# Patient Record
Sex: MALE | Race: WHITE | NOT HISPANIC OR LATINO | Employment: UNEMPLOYED | URBAN - METROPOLITAN AREA
[De-identification: names, ages, dates, MRNs, and addresses within clinical notes are randomized per-mention and may not be internally consistent; named-entity substitution may affect disease eponyms.]

---

## 2018-05-02 ENCOUNTER — TELEPHONE (OUTPATIENT)
Dept: BEHAVIORAL HEALTH | Facility: CLINIC | Age: 48
End: 2018-05-02

## 2018-05-02 ENCOUNTER — HOSPITAL ENCOUNTER (EMERGENCY)
Facility: CLINIC | Age: 48
Discharge: NURSING, REHAB OR LONG-TERM CARE | End: 2018-05-02
Attending: EMERGENCY MEDICINE | Admitting: EMERGENCY MEDICINE
Payer: COMMERCIAL

## 2018-05-02 ENCOUNTER — HOSPITAL ENCOUNTER (INPATIENT)
Facility: CLINIC | Age: 48
LOS: 44 days | Discharge: NURSING, REHAB OR LONG-TERM CARE | End: 2018-06-15
Attending: PSYCHIATRY & NEUROLOGY | Admitting: PSYCHIATRY & NEUROLOGY
Payer: COMMERCIAL

## 2018-05-02 VITALS
HEART RATE: 111 BPM | RESPIRATION RATE: 20 BRPM | DIASTOLIC BLOOD PRESSURE: 77 MMHG | SYSTOLIC BLOOD PRESSURE: 146 MMHG | WEIGHT: 240 LBS | OXYGEN SATURATION: 96 % | TEMPERATURE: 98.4 F | BODY MASS INDEX: 31.81 KG/M2 | HEIGHT: 73 IN

## 2018-05-02 DIAGNOSIS — F25.0 SCHIZOAFFECTIVE DISORDER, BIPOLAR TYPE (H): ICD-10-CM

## 2018-05-02 DIAGNOSIS — R45.851 SUICIDAL IDEATION: ICD-10-CM

## 2018-05-02 DIAGNOSIS — I15.8 OTHER SECONDARY HYPERTENSION: ICD-10-CM

## 2018-05-02 DIAGNOSIS — R52 MODERATE PAIN: ICD-10-CM

## 2018-05-02 DIAGNOSIS — Z72.0 TOBACCO USE: ICD-10-CM

## 2018-05-02 DIAGNOSIS — F25.0 SCHIZOAFFECTIVE DISORDER, BIPOLAR TYPE (H): Primary | ICD-10-CM

## 2018-05-02 LAB
AMPHETAMINES UR QL SCN: POSITIVE
ANION GAP SERPL CALCULATED.3IONS-SCNC: 12 MMOL/L (ref 3–14)
BARBITURATES UR QL: NEGATIVE
BASOPHILS # BLD AUTO: 0.1 10E9/L (ref 0–0.2)
BASOPHILS NFR BLD AUTO: 0.8 %
BENZODIAZ UR QL: NEGATIVE
BUN SERPL-MCNC: 13 MG/DL (ref 7–30)
CALCIUM SERPL-MCNC: 8.6 MG/DL (ref 8.5–10.1)
CANNABINOIDS UR QL SCN: POSITIVE
CHLORIDE SERPL-SCNC: 104 MMOL/L (ref 94–109)
CO2 SERPL-SCNC: 21 MMOL/L (ref 20–32)
COCAINE UR QL: NEGATIVE
CREAT SERPL-MCNC: 0.77 MG/DL (ref 0.66–1.25)
DEPRECATED S PYO AG THROAT QL EIA: NORMAL
DIFFERENTIAL METHOD BLD: NORMAL
EOSINOPHIL # BLD AUTO: 0.3 10E9/L (ref 0–0.7)
EOSINOPHIL NFR BLD AUTO: 3.3 %
ERYTHROCYTE [DISTWIDTH] IN BLOOD BY AUTOMATED COUNT: 12.5 % (ref 10–15)
ETHANOL SERPL-MCNC: <0.01 G/DL
GFR SERPL CREATININE-BSD FRML MDRD: >90 ML/MIN/1.7M2
GLUCOSE SERPL-MCNC: 123 MG/DL (ref 70–99)
HCT VFR BLD AUTO: 40.2 % (ref 40–53)
HGB BLD-MCNC: 14.2 G/DL (ref 13.3–17.7)
IMM GRANULOCYTES # BLD: 0.1 10E9/L (ref 0–0.4)
IMM GRANULOCYTES NFR BLD: 1.2 %
LYMPHOCYTES # BLD AUTO: 2 10E9/L (ref 0.8–5.3)
LYMPHOCYTES NFR BLD AUTO: 22.9 %
MCH RBC QN AUTO: 29.6 PG (ref 26.5–33)
MCHC RBC AUTO-ENTMCNC: 35.3 G/DL (ref 31.5–36.5)
MCV RBC AUTO: 84 FL (ref 78–100)
MONOCYTES # BLD AUTO: 0.5 10E9/L (ref 0–1.3)
MONOCYTES NFR BLD AUTO: 5.5 %
NEUTROPHILS # BLD AUTO: 5.9 10E9/L (ref 1.6–8.3)
NEUTROPHILS NFR BLD AUTO: 66.3 %
OPIATES UR QL SCN: NEGATIVE
PCP UR QL SCN: NEGATIVE
PLATELET # BLD AUTO: 227 10E9/L (ref 150–450)
POTASSIUM SERPL-SCNC: 3.8 MMOL/L (ref 3.4–5.3)
RBC # BLD AUTO: 4.79 10E12/L (ref 4.4–5.9)
SODIUM SERPL-SCNC: 137 MMOL/L (ref 133–144)
SPECIMEN SOURCE: NORMAL
TSH SERPL DL<=0.005 MIU/L-ACNC: 1.34 MU/L (ref 0.4–4)
VALPROATE FREE SERPL-MCNC: 7.6 UG/ML
VALPROATE SERPL-MCNC: 79 MG/L (ref 50–100)
WBC # BLD AUTO: 8.8 10E9/L (ref 4–11)

## 2018-05-02 PROCEDURE — 80307 DRUG TEST PRSMV CHEM ANLYZR: CPT | Performed by: EMERGENCY MEDICINE

## 2018-05-02 PROCEDURE — 87081 CULTURE SCREEN ONLY: CPT | Performed by: EMERGENCY MEDICINE

## 2018-05-02 PROCEDURE — 90791 PSYCH DIAGNOSTIC EVALUATION: CPT

## 2018-05-02 PROCEDURE — 87880 STREP A ASSAY W/OPTIC: CPT | Performed by: EMERGENCY MEDICINE

## 2018-05-02 PROCEDURE — 12400007 ZZH R&B MH INTERMEDIATE UMMC

## 2018-05-02 PROCEDURE — 36415 COLL VENOUS BLD VENIPUNCTURE: CPT

## 2018-05-02 PROCEDURE — 85025 COMPLETE CBC W/AUTO DIFF WBC: CPT | Performed by: EMERGENCY MEDICINE

## 2018-05-02 PROCEDURE — 80048 BASIC METABOLIC PNL TOTAL CA: CPT | Performed by: EMERGENCY MEDICINE

## 2018-05-02 PROCEDURE — 80164 ASSAY DIPROPYLACETIC ACD TOT: CPT | Performed by: EMERGENCY MEDICINE

## 2018-05-02 PROCEDURE — 99285 EMERGENCY DEPT VISIT HI MDM: CPT | Mod: 25

## 2018-05-02 PROCEDURE — 80165 DIPROPYLACETIC ACID FREE: CPT | Performed by: EMERGENCY MEDICINE

## 2018-05-02 PROCEDURE — 84443 ASSAY THYROID STIM HORMONE: CPT | Performed by: EMERGENCY MEDICINE

## 2018-05-02 PROCEDURE — 25000132 ZZH RX MED GY IP 250 OP 250 PS 637: Performed by: PSYCHIATRY & NEUROLOGY

## 2018-05-02 PROCEDURE — 80320 DRUG SCREEN QUANTALCOHOLS: CPT | Performed by: EMERGENCY MEDICINE

## 2018-05-02 RX ORDER — LANOLIN ALCOHOL/MO/W.PET/CERES
1000 CREAM (GRAM) TOPICAL DAILY
Status: ON HOLD | COMMUNITY
End: 2018-05-21

## 2018-05-02 RX ORDER — ACETAMINOPHEN 325 MG/1
650 TABLET ORAL EVERY 4 HOURS PRN
Status: DISCONTINUED | OUTPATIENT
Start: 2018-05-02 | End: 2018-06-15 | Stop reason: HOSPADM

## 2018-05-02 RX ORDER — LISINOPRIL 20 MG/1
20 TABLET ORAL DAILY
Status: ON HOLD | COMMUNITY
Start: 2018-02-08 | End: 2018-05-21

## 2018-05-02 RX ORDER — BISACODYL 10 MG
10 SUPPOSITORY, RECTAL RECTAL DAILY PRN
Status: DISCONTINUED | OUTPATIENT
Start: 2018-05-02 | End: 2018-06-15 | Stop reason: HOSPADM

## 2018-05-02 RX ORDER — DIVALPROEX SODIUM 500 MG/1
2000 TABLET, EXTENDED RELEASE ORAL AT BEDTIME
Status: DISCONTINUED | OUTPATIENT
Start: 2018-05-02 | End: 2018-06-08

## 2018-05-02 RX ORDER — OLANZAPINE 10 MG/1
10 TABLET ORAL
Status: DISCONTINUED | OUTPATIENT
Start: 2018-05-02 | End: 2018-06-15 | Stop reason: HOSPADM

## 2018-05-02 RX ORDER — NICOTINE 21 MG/24HR
1 PATCH, TRANSDERMAL 24 HOURS TRANSDERMAL DAILY
Status: DISCONTINUED | OUTPATIENT
Start: 2018-05-02 | End: 2018-06-15 | Stop reason: HOSPADM

## 2018-05-02 RX ORDER — ALUMINA, MAGNESIA, AND SIMETHICONE 2400; 2400; 240 MG/30ML; MG/30ML; MG/30ML
30 SUSPENSION ORAL EVERY 4 HOURS PRN
Status: DISCONTINUED | OUTPATIENT
Start: 2018-05-02 | End: 2018-06-15 | Stop reason: HOSPADM

## 2018-05-02 RX ORDER — HYDROXYZINE HYDROCHLORIDE 25 MG/1
25 TABLET, FILM COATED ORAL EVERY 4 HOURS PRN
Status: DISCONTINUED | OUTPATIENT
Start: 2018-05-02 | End: 2018-06-15 | Stop reason: HOSPADM

## 2018-05-02 RX ORDER — LISINOPRIL 20 MG/1
20 TABLET ORAL DAILY
Status: DISCONTINUED | OUTPATIENT
Start: 2018-05-03 | End: 2018-06-15 | Stop reason: HOSPADM

## 2018-05-02 RX ORDER — OLANZAPINE 10 MG/2ML
10 INJECTION, POWDER, FOR SOLUTION INTRAMUSCULAR
Status: DISCONTINUED | OUTPATIENT
Start: 2018-05-02 | End: 2018-06-15 | Stop reason: HOSPADM

## 2018-05-02 RX ORDER — LANOLIN ALCOHOL/MO/W.PET/CERES
1000 CREAM (GRAM) TOPICAL DAILY
Status: DISCONTINUED | OUTPATIENT
Start: 2018-05-03 | End: 2018-06-15 | Stop reason: HOSPADM

## 2018-05-02 RX ORDER — TRAZODONE HYDROCHLORIDE 50 MG/1
50 TABLET, FILM COATED ORAL
Status: DISCONTINUED | OUTPATIENT
Start: 2018-05-02 | End: 2018-06-15 | Stop reason: HOSPADM

## 2018-05-02 RX ORDER — MIRTAZAPINE 15 MG/1
15 TABLET, FILM COATED ORAL AT BEDTIME
Status: DISCONTINUED | OUTPATIENT
Start: 2018-05-02 | End: 2018-06-15 | Stop reason: HOSPADM

## 2018-05-02 RX ORDER — LANOLIN ALCOHOL/MO/W.PET/CERES
3 CREAM (GRAM) TOPICAL
Status: DISCONTINUED | OUTPATIENT
Start: 2018-05-02 | End: 2018-06-15 | Stop reason: HOSPADM

## 2018-05-02 RX ORDER — MIRTAZAPINE 15 MG/1
15 TABLET, FILM COATED ORAL AT BEDTIME
Status: ON HOLD | COMMUNITY
Start: 2018-03-12 | End: 2018-05-21

## 2018-05-02 RX ORDER — DIVALPROEX SODIUM 500 MG/1
2000 TABLET, EXTENDED RELEASE ORAL AT BEDTIME
Status: ON HOLD | COMMUNITY
Start: 2018-03-12 | End: 2018-05-21

## 2018-05-02 RX ADMIN — NICOTINE 1 PATCH: 21 PATCH, EXTENDED RELEASE TRANSDERMAL at 21:28

## 2018-05-02 RX ADMIN — DIVALPROEX SODIUM 2000 MG: 500 TABLET, EXTENDED RELEASE ORAL at 21:28

## 2018-05-02 RX ADMIN — MIRTAZAPINE 15 MG: 15 TABLET, FILM COATED ORAL at 21:28

## 2018-05-02 ASSESSMENT — ENCOUNTER SYMPTOMS
FEVER: 0
SORE THROAT: 1
SLEEP DISTURBANCE: 1
HALLUCINATIONS: 0
COUGH: 0
NERVOUS/ANXIOUS: 1

## 2018-05-02 ASSESSMENT — ACTIVITIES OF DAILY LIVING (ADL)
GROOMING: INDEPENDENT
DRESS: INDEPENDENT
LAUNDRY: WITH SUPERVISION
ORAL_HYGIENE: INDEPENDENT

## 2018-05-02 NOTE — ED NOTES
[facility] called any information sent on pt. BALAJI Miller stated she would call back with update.

## 2018-05-02 NOTE — IP AVS SNAPSHOT
[address] [address]    Phone:  [phone]                                       After Visit Summary   5/2/2018    Nabor Mendoza    MRN: [id number]           After Visit Summary Signature Page     I have received my discharge instructions, and my questions have been answered. I have discussed any challenges I see with this plan with the nurse or doctor.    ..........................................................................................................................................  Patient/Patient Representative Signature      ..........................................................................................................................................  Patient Representative Print Name and Relationship to Patient    ..................................................               ................................................  Date                                            Time    ..........................................................................................................................................  Reviewed by Signature/Title    ...................................................              ..............................................  Date                                                            Time

## 2018-05-02 NOTE — ED NOTES
BALAJI Miller of [facility] called informing they would not accept pt. MD notified.

## 2018-05-02 NOTE — ED NOTES
"Pt being manipulative asking to be able to leave in\"5seconds\". Pt updated on hold and plan to admit to the hospital. Pt agreed. Pt now talking on the phone with wife.   "

## 2018-05-02 NOTE — ED PROVIDER NOTES
Sign Out Note    Pt accepted in sign out from: Dr. Baxter    Briefly pt presented to the ED for: SI    Plan at time of sign out: await placement.    Care of patient during my shift: no issues.    Plan for patient at this time: pt accepted by Dr. Chatterjee at [facility]. Pt requests to go there. Proper paperwork filled out.     Davonte Neff,   05/02/18 1737
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
## 2018-05-02 NOTE — IP AVS SNAPSHOT
MRN:[id number]                      After Visit Summary   5/2/2018    Nabor Mendoza    MRN: [id number]           Thank you!     Thank you for choosing [facility] for your care. Our goal is always to provide you with excellent care.        Patient Information     Date Of Birth          1970        Designated Caregiver       Most Recent Value    Caregiver    Will someone help with your care after discharge? yes    Name of designated caregiver Dionna Mora    Phone number of caregiver [phone]    Caregiver address [address], [facility], [address]      About your hospital stay     You were admitted on:  May 2, 2018 You last received care in the:   [facility]    You were discharged on:  Lakia 15, 2018       Who to Call     For medical emergencies, please call 911.  For non-urgent questions about your medical care, please call your primary care provider or clinic, [phone]          Attending Provider     Provider Specialty    Jamaal Golden MD Psychiatry    Vasu Chatterjee MD Psychiatry       Primary Care Provider Office Phone # Fax #    Herman Torrez [phone]      Additional Services     Medication Therapy Management Referral       MTM referral reason            Depakote prescribed     This service is designed to help you get the most from your medications.  A specially trained pharmacist will work closely with you and your doctors  to solve any problems related to your medications and to help you get the   best results from taking them.      The Medication Therapy Management staff will call you to schedule an appointment.                  Further instructions from your care team        Behavioral Discharge Planning and Instructions      Summary:  You were admitted on 5/2/2018  due to relapsing and mood disorder.  You were treated by Dr. Vasu Chatterjee MD and discharged on 6/15/18 from [facility] to [facility]: [address] [place] [address].  Patient's provisional discharge (PD) was revoke from the [facility]. Wale Woo ([phone]) from [facility] will complete the new PD.      Principal Diagnosis: Schizophrenia      Health Care Follow-up Appointments:   [facility] Psychiatry [phone]  [address] [facility]  Dr. Torrez Tuesday July 24th @ 9:40am  Invega Injection: Monday June 18th @ 9:20  Invega Injection on July 13th @ 1:20        : April Colon  [phone]          Attend all scheduled appointments with your outpatient providers. Call at least 24 hours in advance if you need to reschedule an appointment to ensure continued access to your outpatient providers.   Major Treatments, Procedures and Findings:  You were provided with: a psychiatric assessment, medication evaluation and/or management and CD evaluation/assessment    Symptoms to Report: feeling more aggressive, increased confusion, losing more sleep, mood getting worse or thoughts of suicide    Early warning signs can include: increased depression or anxiety sleep disturbances increased thoughts or behaviors of suicide or self-harm  increased unusual thinking, such as paranoia or hearing voices    Safety and Wellness:  Take all medicines as directed.  Make no changes unless your doctor suggests them.      Follow treatment recommendations.  Refrain from alcohol and non-prescribed drugs.  If there is a concern for safety, call 911.    Resources:   Crisis Intervention: 557.206.3508 or 491-502-3912 (TTY: 155.787.3751).  Call anytime for help.  National [place] on Mental Illness (www.[place].org): 561.915.4469 or 068-099-3586.  Alcoholics Anonymous (www.alcoholics-anonymous.org): Check your phone book for your local chapter.  [facility] Crisis (COPE) Response - Adult [phone]    The treatment team has appreciated the opportunity to work with you.     If you have any questions or concerns our unit number is [phone].        Pending Results     No orders  "found from 2018 to 5/3/2018.            Statement of Approval     Ordered          18 1728  I have reviewed and agree with all the recommendations and orders detailed in this document.  EFFECTIVE NOW     Approved and electronically signed by:  Vasu Chatterjee MD             Admission Information     Date & Time Provider Department Dept. Phone    2018 Vasu Chatterjee MD  [facility] [phone]      Your Vitals Were     Blood Pressure Pulse Temperature Respirations Height Weight    143/87 (BP Location: Left arm) 93 98.5  F (36.9  C) (Oral) 16 1.829 m (6') 112.9 kg (249 lb)    Pulse Oximetry BMI (Body Mass Index)                97% 33.77 kg/m2          MyChart Information     [employer] lets you send messages to your doctor, view your test results, renew your prescriptions, schedule appointments and more. To sign up, go to [email or web address] . Click on \"Log in\" on the left side of the screen, which will take you to the Welcome page. Then click on \"Sign up Now\" on the right side of the page.     You will be asked to enter the access code listed below, as well as some personal information. Please follow the directions to create your username and password.     Your access code is: [email or web address]  Expires: 2018  4:26 PM     Your access code will  in 90 days. If you need help or a new code, please call your [facility] clinic or [phone].        Care EveryWhere ID     This is your Care EveryWhere ID. This could be used by other organizations to access your [facility] medical records  [id number]        Equal Access to Services     [facility] : marifer Tamez, adrienne dorsey. So [facility] [phone].    ATENCIÓN: Si habla español, tiene a sawyer disposición servicios gratuitos de asistencia lingüística. Llame al [phone].    We comply with applicable federal civil rights laws and [place] laws. We do not " discriminate on the basis of race, color, national origin, age, disability, sex, sexual orientation, or gender identity.               Review of your medicines      START taking        Dose / Directions    hydrOXYzine 25 MG tablet   Commonly known as:  ATARAX        Dose:  25 mg   Take 1 tablet (25 mg) by mouth every 4 hours as needed for anxiety   Quantity:  30 tablet   Refills:  1       ibuprofen 600 MG tablet   Commonly known as:  ADVIL/MOTRIN        Dose:  600 mg   Take 1 tablet (600 mg) by mouth every 6 hours as needed for moderate pain   Quantity:  30 tablet   Refills:  1       nicotine 21 MG/24HR 24 hr patch   Commonly known as:  NICODERM CQ        Dose:  1 patch   Place 1 patch onto the skin daily   Quantity:  30 patch   Refills:  1       * OLANZapine 5 MG tablet   Commonly known as:  zyPREXA        Dose:  5 mg   Take 1 tablet (5 mg) by mouth At Bedtime   Quantity:  30 tablet   Refills:  1       * OLANZapine 7.5 MG tablet   Commonly known as:  zyPREXA        Dose:  7.5 mg   Take 1 tablet (7.5 mg) by mouth daily   Quantity:  30 tablet   Refills:  1       * Notice:  This list has 2 medication(s) that are the same as other medications prescribed for you. Read the directions carefully, and ask your doctor or other care provider to review them with you.      CONTINUE these medicines which may have CHANGED, or have new prescriptions. If we are uncertain of the size of tablets/capsules you have at home, strength may be listed as something that might have changed.        Dose / Directions    * divalproex sodium extended-release 500 MG 24 hr tablet   Commonly known as:  DEPAKOTE ER   This may have changed:  Another medication with the same name was added. Make sure you understand how and when to take each.        Dose:  2000 mg   Take 4 tablets (2,000 mg) by mouth At Bedtime   Quantity:  120 tablet   Refills:  1       * divalproex sodium extended-release 250 MG 24 hr tablet   Commonly known as:  DEPAKOTE ER   This  may have changed:  You were already taking a medication with the same name, and this prescription was added. Make sure you understand how and when to take each.        Dose:  1500 mg   Take 6 tablets (1,500 mg) by mouth At Bedtime   Quantity:  210 tablet   Refills:  1       melatonin 3 MG Caps   This may have changed:  medication strength        Dose:  3 mg   Take 3 mg by mouth nightly as needed (exact dose unknown)   Quantity:  30 capsule   Refills:  1       paliperidone 156 MG/ML Susp injection   Commonly known as:  INVEGA SUSTENNA   Indication:  Schizophrenia   This may have changed:  additional instructions        Dose:  156 mg   Inject 1 mL (156 mg) into the muscle every 28 days Next dose 6/15/2018   Quantity:  0.9 mL   Refills:  1       * Notice:  This list has 2 medication(s) that are the same as other medications prescribed for you. Read the directions carefully, and ask your doctor or other care provider to review them with you.      CONTINUE these medicines which have NOT CHANGED        Dose / Directions    cyanocobalamin 1000 MCG tablet   Commonly known as:  vitamin  B-12        Dose:  1000 mcg   Take 1 tablet (1,000 mcg) by mouth daily   Quantity:  30 tablet   Refills:  1       lisinopril 20 MG tablet   Commonly known as:  PRINIVIL/ZESTRIL        Dose:  20 mg   Take 1 tablet (20 mg) by mouth daily   Quantity:  30 tablet   Refills:  1       mirtazapine 15 MG tablet   Commonly known as:  REMERON        Dose:  15 mg   Take 1 tablet (15 mg) by mouth At Bedtime   Quantity:  30 tablet   Refills:  1       vitamin D3 1000 units Caps        Dose:  2000 Units   Take 2,000 Units by mouth daily   Quantity:  30 capsule   Refills:  1            Where to get your medicines      These medications were sent to [facility] [place], [place] - [address]  [address] [address]     Phone:  [phone]     cyanocobalamin 1000 MCG tablet    divalproex sodium extended-release 250 MG  24 hr tablet    divalproex sodium extended-release 500 MG 24 hr tablet    hydrOXYzine 25 MG tablet    ibuprofen 600 MG tablet    lisinopril 20 MG tablet    melatonin 3 MG Caps    mirtazapine 15 MG tablet    nicotine 21 MG/24HR 24 hr patch    OLANZapine 5 MG tablet    OLANZapine 7.5 MG tablet    paliperidone 156 MG/ML Susp injection    vitamin D3 1000 units Caps                Protect others around you: Learn how to safely use, store and throw away your medicines at www.disposemymeds.org.             Medication List: This is a list of all your medications and when to take them. Check marks below indicate your daily home schedule. Keep this list as a reference.      Medications           Morning Afternoon Evening Bedtime As Needed    cyanocobalamin 1000 MCG tablet   Commonly known as:  vitamin  B-12   Take 1 tablet (1,000 mcg) by mouth daily   Last time this was given:  1,000 mcg on 6/15/2018  8:41 AM                                * divalproex sodium extended-release 500 MG 24 hr tablet   Commonly known as:  DEPAKOTE ER   Take 4 tablets (2,000 mg) by mouth At Bedtime   Last time this was given:  1,750 mg on 6/14/2018  7:26 PM                                * divalproex sodium extended-release 250 MG 24 hr tablet   Commonly known as:  DEPAKOTE ER   Take 6 tablets (1,500 mg) by mouth At Bedtime   Last time this was given:  1,750 mg on 6/14/2018  7:26 PM                                hydrOXYzine 25 MG tablet   Commonly known as:  ATARAX   Take 1 tablet (25 mg) by mouth every 4 hours as needed for anxiety   Last time this was given:  25 mg on 5/14/2018 11:59 PM                                ibuprofen 600 MG tablet   Commonly known as:  ADVIL/MOTRIN   Take 1 tablet (600 mg) by mouth every 6 hours as needed for moderate pain   Last time this was given:  600 mg on 6/14/2018 10:51 AM                                lisinopril 20 MG tablet   Commonly known as:  PRINIVIL/ZESTRIL   Take 1 tablet (20 mg) by mouth daily   Last  time this was given:  20 mg on 6/15/2018  8:41 AM                                melatonin 3 MG Caps   Take 3 mg by mouth nightly as needed (exact dose unknown)                                mirtazapine 15 MG tablet   Commonly known as:  REMERON   Take 1 tablet (15 mg) by mouth At Bedtime   Last time this was given:  15 mg on 6/14/2018  8:00 PM                                nicotine 21 MG/24HR 24 hr patch   Commonly known as:  NICODERM CQ   Place 1 patch onto the skin daily   Last time this was given:  1 patch on 6/14/2018  7:25 PM                                * OLANZapine 5 MG tablet   Commonly known as:  zyPREXA   Take 1 tablet (5 mg) by mouth At Bedtime   Last time this was given:  7.5 mg on 6/15/2018  8:41 AM                                * OLANZapine 7.5 MG tablet   Commonly known as:  zyPREXA   Take 1 tablet (7.5 mg) by mouth daily   Last time this was given:  7.5 mg on 6/15/2018  8:41 AM                                paliperidone 156 MG/ML Susp injection   Commonly known as:  INVEGA SUSTENNA   Inject 1 mL (156 mg) into the muscle every 28 days Next dose 6/15/2018   Last time this was given:  156 mg on 5/18/2018  7:15 PM                                vitamin D3 1000 units Caps   Take 2,000 Units by mouth daily                                * Notice:  This list has 4 medication(s) that are the same as other medications prescribed for you. Read the directions carefully, and ask your doctor or other care provider to review them with you.

## 2018-05-02 NOTE — PHARMACY-ADMISSION MEDICATION HISTORY
Admission medication history interview status for the 5/2/2018  admission is complete. See EPIC admission navigator for prior to admission medications     Medication history source reliability:Moderate    Actions taken by pharmacist (provider contacted, etc):None     Additional medication history information not noted on PTA med list :None    Medication reconciliation/reorder completed by provider prior to medication history? No    Time spent in this activity: 15 mins    Prior to Admission medications    Medication Sig Last Dose Taking? Auth Provider   Cholecalciferol (VITAMIN D3) 1000 units CAPS Take 2,000 Units by mouth daily  Past Week at Unknown time Yes Reported, Patient   cyanocobalamin (VITAMIN  B-12) 1000 MCG tablet Take 1,000 mcg by mouth daily Past Week at Unknown time Yes Unknown, Entered By History   divalproex sodium extended-release (DEPAKOTE ER) 500 MG 24 hr tablet Take 2,000 mg by mouth At Bedtime  5/1/2018 at Unknown time Yes Reported, Patient   lisinopril (PRINIVIL/ZESTRIL) 20 MG tablet Take 20 mg by mouth daily  5/1/2018 at Unknown time Yes Reported, Patient   MELATONIN PO Take 3 mg by mouth nightly as needed (exact dose unknown) prn Yes Unknown, Entered By History   mirtazapine (REMERON) 15 MG tablet Take 15 mg by mouth At Bedtime  5/1/2018 at Unknown time Yes Reported, Patient   paliperidone (INVEGA SUSTENNA) 156 MG/ML SUSP injection Inject 156 mg into the muscle 4/11/2018 at due May 11 Yes Reported, Patient       Cassandra Zamora, PharmD

## 2018-05-03 PROCEDURE — 12400007 ZZH R&B MH INTERMEDIATE UMMC

## 2018-05-03 PROCEDURE — 25000132 ZZH RX MED GY IP 250 OP 250 PS 637: Performed by: PSYCHIATRY & NEUROLOGY

## 2018-05-03 PROCEDURE — 99221 1ST HOSP IP/OBS SF/LOW 40: CPT | Mod: AI | Performed by: PSYCHIATRY & NEUROLOGY

## 2018-05-03 RX ORDER — GUAIFENESIN/DEXTROMETHORPHAN 100-10MG/5
5 SYRUP ORAL EVERY 4 HOURS PRN
Status: DISCONTINUED | OUTPATIENT
Start: 2018-05-03 | End: 2018-06-15 | Stop reason: HOSPADM

## 2018-05-03 RX ADMIN — NICOTINE 1 PATCH: 21 PATCH, EXTENDED RELEASE TRANSDERMAL at 21:59

## 2018-05-03 RX ADMIN — OLANZAPINE 10 MG: 10 TABLET, FILM COATED ORAL at 11:18

## 2018-05-03 RX ADMIN — CYANOCOBALAMIN TAB 1000 MCG 1000 MCG: 1000 TAB at 08:54

## 2018-05-03 RX ADMIN — ALUMINUM HYDROXIDE, MAGNESIUM HYDROXIDE, AND DIMETHICONE 30 ML: 400; 400; 40 SUSPENSION ORAL at 22:02

## 2018-05-03 RX ADMIN — DIVALPROEX SODIUM 2000 MG: 500 TABLET, EXTENDED RELEASE ORAL at 21:59

## 2018-05-03 RX ADMIN — VITAMIN D, TAB 1000IU (100/BT) 2000 UNITS: 25 TAB at 08:54

## 2018-05-03 RX ADMIN — MIRTAZAPINE 15 MG: 15 TABLET, FILM COATED ORAL at 21:59

## 2018-05-03 RX ADMIN — GUAIFENESIN AND DEXTROMETHORPHAN HYDROBROMIDE 5 ML: 100; 10 SOLUTION ORAL at 14:43

## 2018-05-03 RX ADMIN — LISINOPRIL 20 MG: 20 TABLET ORAL at 08:54

## 2018-05-03 ASSESSMENT — ACTIVITIES OF DAILY LIVING (ADL)
ORAL_HYGIENE: INDEPENDENT
DRESS: INDEPENDENT
LAUNDRY: WITH SUPERVISION
HYGIENE/GROOMING: INDEPENDENT

## 2018-05-03 NOTE — PROGRESS NOTES
PATIENT BELONGINGS:  Shoes, cigarettes, lighter, pants, shirt, socks, belt, lighter x2, wallet, [place] State ID    BELONGINGS SENT TO SECURITY:  None      A               Admission:  I am responsible for any personal items that are not sent to the safe or pharmacy.  [facility] is not responsible for loss, theft or damage of any property in my possession.    Signature:  _________________________________ Date: _______  Time: _____                                              Staff Signature:  ____________________________ Date: ________  Time: _____      2nd Staff person, if patient is unable/unwilling to sign:    Signature: ________________________________ Date: ________  Time: _____     Discharge:  [facility] has returned all of my personal belongings:    Signature: _________________________________ Date: ________  Time: _____                                          Staff Signature:  ____________________________ Date: ________  Time: _____
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
## 2018-05-03 NOTE — PROGRESS NOTES
Patient agreed to complete a R25 assessment tomorrow with Kay from [facility]. JIGAR signed for assessment and SARAVANAN Colon. Writer left message with April.    SARAVANAN Chen called back stating she will revoke pt's PD.
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
## 2018-05-04 LAB
BACTERIA SPEC CULT: NORMAL
Lab: NORMAL
SPECIMEN SOURCE: NORMAL

## 2018-05-04 PROCEDURE — 25000132 ZZH RX MED GY IP 250 OP 250 PS 637: Performed by: PSYCHIATRY & NEUROLOGY

## 2018-05-04 PROCEDURE — 99231 SBSQ HOSP IP/OBS SF/LOW 25: CPT | Performed by: PSYCHIATRY & NEUROLOGY

## 2018-05-04 PROCEDURE — 12400007 ZZH R&B MH INTERMEDIATE UMMC

## 2018-05-04 RX ADMIN — DIVALPROEX SODIUM 2000 MG: 500 TABLET, EXTENDED RELEASE ORAL at 22:13

## 2018-05-04 RX ADMIN — NICOTINE 1 PATCH: 21 PATCH, EXTENDED RELEASE TRANSDERMAL at 20:27

## 2018-05-04 RX ADMIN — CYANOCOBALAMIN TAB 1000 MCG 1000 MCG: 1000 TAB at 08:49

## 2018-05-04 RX ADMIN — LISINOPRIL 20 MG: 20 TABLET ORAL at 08:49

## 2018-05-04 RX ADMIN — VITAMIN D, TAB 1000IU (100/BT) 2000 UNITS: 25 TAB at 08:49

## 2018-05-04 RX ADMIN — HYDROXYZINE HYDROCHLORIDE 25 MG: 25 TABLET ORAL at 13:25

## 2018-05-04 RX ADMIN — MIRTAZAPINE 15 MG: 15 TABLET, FILM COATED ORAL at 22:13

## 2018-05-04 ASSESSMENT — ACTIVITIES OF DAILY LIVING (ADL)
ORAL_HYGIENE: INDEPENDENT
GROOMING: INDEPENDENT
DRESS: INDEPENDENT
DRESS: INDEPENDENT
LAUNDRY: WITH SUPERVISION
ORAL_HYGIENE: INDEPENDENT
HYGIENE/GROOMING: INDEPENDENT

## 2018-05-04 NOTE — PROGRESS NOTES
[facility], [place]   Psychiatric Progress Note        Interim History:     The patient's care was discussed with the treatment team during the daily team meeting and/or staff's chart notes were reviewed.  Staff report patient spent more time outside of his room. He was very pleasant during today's visit with me, apologized for being rude, admitted that he abused illicit substances: yesterday he said it was Adderall he got from someone else, today mentioned both Cocaine and Meth (he tested positively for cannabis and amphetamines). Said that he realized that using those substances was a breach of his provisional discharge, he was open to going to  treatment. I informed him that as a condition of his commitment he might need to be transferred to [facility].         Medications:       cholecalciferol  2,000 Units Oral Daily     cyanocobalamin  1,000 mcg Oral Daily     divalproex sodium extended-release  2,000 mg Oral At Bedtime     lisinopril  20 mg Oral Daily     mirtazapine  15 mg Oral At Bedtime     nicotine  1 patch Transdermal Daily     nicotine   Transdermal Q8H     nicotine   Transdermal Daily     [START ON 5/18/2018] paliperidone  156 mg Intramuscular Q28 Days          Allergies:   No Known Allergies       Labs:   No results found for this or any previous visit (from the past 24 hour(s)).       Psychiatric Examination:     /90  Pulse 90  Temp 98.5  F (36.9  C) (Oral)  Resp 16  Ht 1.829 m (6')  Wt 107 kg (236 lb)  BMI 32.01 kg/m2  Weight is 236 lbs 0 oz  Body mass index is 32.01 kg/(m^2).  Orthostatic Vitals       Most Recent      Sitting Orthostatic /93 05/04 0832    Sitting Orthostatic Pulse (bpm) 96 05/04 0832    Standing Orthostatic /72 05/04 0832    Standing Orthostatic Pulse (bpm) 124 05/04 0832            Appearance: awake, alert and dressed in hospital scrubs  Attitude:  cooperative  Eye Contact:  fair  Mood:  anxious  Affect:  constricted  mobility  Speech:  clear, coherent  Psychomotor Behavior:  no evidence of tardive dyskinesia, dystonia, or tics  Throught Process:  logical and linear  Associations:  no loose associations  Thought Content:  no evidence of suicidal ideation or homicidal ideation and no evidence of psychotic thought  Insight:  fair  Judgement:  fair  Oriented to:  time, person, and place  Attention Span and Concentration:  fair  Recent and Remote Memory:  fair    Clinical Global Impressions  First:  Considering your total clinical experience with this particular patient population, how severe are the patient's symptoms at this time?: 6 (05/03/18 1825)  Compared to the patient's condition at the START of treatment, this patient's condition is:: 4 (05/03/18 1825)  Most recent:  Considering your total clinical experience with this particular patient population, how severe are the patient's symptoms at this time?: 6 (05/03/18 1825)  Compared to the patient's condition at the START of treatment, this patient's condition is:: 4 (05/03/18 1825)    # Pain Assessment:  Current Pain Score 5/4/2018   Patient currently in pain? denies   Pain score (0-10) -   Nabor s pain level was assessed and he currently denies pain.             Precautions:     Behavioral Orders   Procedures     Code 1 - Restrict to Unit     Routine Programming     As clinically indicated     Status 15     Every 15 minutes.     Suicide precautions     Patients on Suicide Precautions should have a Combination Diet ordered that includes a Diet selection(s) AND a Behavioral Tray selection for Safe Tray - with utensils, or Safe Tray - NO utensils            DIagnoses:     Bipolar affective disorder, mixed, history of diagnosis of schizoaffective disorder and schizophrenia as well, history of cannabis use disorder, rule out stimulant use disorder.          Plan:   No medication changes today. May need to be transferred [place] [facility]. Plan is for MICD treatment.           no

## 2018-05-04 NOTE — PROGRESS NOTES
Pt isolative to room for the majority of the evening. Ate dinner in lounge. Pleasant upon approach, and agreeable to taking his medications. Pt states that he had been taking his medications outside of the hospital, but was also drinking at that time.    It was reported that pt had several moments of loud snoring that would cease for several seconds at a time, and begin again. Will continue to monitor for this.    Pt has no additional concerns at this time. Will continue to assess and offer support.
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
## 2018-05-04 NOTE — PROGRESS NOTES
05/04/18 1359   Behavioral Health   Hallucinations denies / not responding to hallucinations   Thinking distractable   Orientation person: oriented;place: oriented;date: oriented;time: oriented   Memory baseline memory   Insight admits / accepts   Judgement impaired   Eye Contact at examiner   Affect full range affect   Mood mood is calm   Physical Appearance/Attire appears stated age;neat   Hygiene well groomed   Suicidality other (see comments)  (denies)   1. Wish to be Dead No   2. Non-Specific Active Suicidal Thoughts  No   Self Injury other (see comment)  (denies)   Elopement (none observed )   Activity withdrawn   Speech clear;coherent   Medication Sensitivity no stated side effects   Psychomotor / Gait balanced;steady   Safety   Suicidality Status 15   Psycho Education   Type of Intervention 1:1 intervention   Response participates, initiates socially appropriate   Hours 0.5   Treatment Detail (check in)   Activities of Daily Living   Hygiene/Grooming independent   Oral Hygiene independent   Dress independent   Laundry with supervision   Room Organization independent   Groups   Details (did not attend groups)     Patient was visible, pacing blunt, hyperactive but withdrawn. Reports improved mood, stated feeling much better today than prior. States he's somewhat anxious and depressed but manageable, rated as 3. Patient wants to know the next step to discharge. Patient was pleasant and appropriate on the unit.

## 2018-05-05 PROCEDURE — 12400007 ZZH R&B MH INTERMEDIATE UMMC

## 2018-05-05 PROCEDURE — 25000132 ZZH RX MED GY IP 250 OP 250 PS 637: Performed by: PSYCHIATRY & NEUROLOGY

## 2018-05-05 RX ADMIN — NICOTINE 1 PATCH: 21 PATCH, EXTENDED RELEASE TRANSDERMAL at 22:41

## 2018-05-05 RX ADMIN — DIVALPROEX SODIUM 2000 MG: 500 TABLET, EXTENDED RELEASE ORAL at 22:41

## 2018-05-05 RX ADMIN — LISINOPRIL 20 MG: 20 TABLET ORAL at 08:58

## 2018-05-05 RX ADMIN — VITAMIN D, TAB 1000IU (100/BT) 2000 UNITS: 25 TAB at 08:58

## 2018-05-05 RX ADMIN — CYANOCOBALAMIN TAB 1000 MCG 1000 MCG: 1000 TAB at 08:58

## 2018-05-05 RX ADMIN — MIRTAZAPINE 15 MG: 15 TABLET, FILM COATED ORAL at 22:42

## 2018-05-05 ASSESSMENT — ACTIVITIES OF DAILY LIVING (ADL)
GROOMING: INDEPENDENT
DRESS: INDEPENDENT
ORAL_HYGIENE: INDEPENDENT
ORAL_HYGIENE: INDEPENDENT
DRESS: STREET CLOTHES;INDEPENDENT
LAUNDRY: WITH SUPERVISION
GROOMING: INDEPENDENT

## 2018-05-05 NOTE — PROGRESS NOTES
05/05/18 1453   Behavioral Health   Hallucinations denies / not responding to hallucinations   Thinking poor concentration   Orientation person: oriented;place: oriented;date: oriented   Memory baseline memory   Insight admits / accepts   Judgement impaired   Eye Contact at examiner   Affect blunted, flat   Mood depressed   Physical Appearance/Attire attire appropriate to age and situation   Hygiene well groomed   Suicidality other (see comments)  (Denies)   1. Wish to be Dead No   2. Non-Specific Active Suicidal Thoughts  No   Self Injury other (see comment)  (Denies)   Elopement (nothing to report)   Activity isolative;withdrawn   Speech clear;coherent   Medication Sensitivity no observed side effects   Psychomotor / Gait balanced;steady   Psycho Education   Type of Intervention 1:1 intervention   Response participates, initiates socially appropriate   Hours 0.5   Treatment Detail Check In   Activities of Daily Living   Hygiene/Grooming independent   Oral Hygiene independent   Dress independent   Room Organization independent   Patient was in his room sleeping/napping most of the shift.  The patient reports he was feeling tired and that his goal was to sleep some.  The patient did come out for both meals and was moderately social with peers during meals.  The patient did not attend any groups this shift, as he was in his room sleeping when not out for meals.  The patient reports that he is waiting to get into CD treatment and that he is lloking forward to this.  The patient denies SI and SiB.

## 2018-05-05 NOTE — PROGRESS NOTES
Pt visible in milieu off and on this evening. He was pleasant, and more engaged than previous shifts. Pt states that he does have sleep apnea, but has not found a solution for this. Reports that his CPAP did not work for him. Provided pt with additional pillows to prop under his head.    Pt took all medications without issue. No additional concerns at this time. Will continue to assess and offer support.

## 2018-05-05 NOTE — PROGRESS NOTES
Pt calm and pleasant this shift. Was able to come out into milieu and did participate in community meeting but retired to bed soon after dinner. Says he's just in phase of catching up sleep hence why he has been sleeping the past couple of days. Seemed pretty content with switching to [facility] and then doing a 6-month treatment. Says he needs to get himself better and that this past year has been very rough. Otherwise, denies SI/SIB and AH. No other major concerns presented.        05/04/18 2153   Behavioral Health   Hallucinations denies / not responding to hallucinations   Thinking distractable   Orientation person: oriented;place: oriented;date: oriented;time: oriented   Memory baseline memory   Insight poor   Judgement impaired   Eye Contact at examiner   Affect full range affect   Mood mood is calm   Physical Appearance/Attire appears stated age;attire appropriate to age and situation;neat   Hygiene well groomed   Suicidality other (see comments)  (denies)   1. Wish to be Dead No   2. Non-Specific Active Suicidal Thoughts  No   Self Injury other (see comment)  (denies)   Elopement (none)   Activity other (see comment)  (able to be in milieu for a bit)   Speech coherent;clear   Medication Sensitivity no stated side effects;no observed side effects   Psychomotor / Gait steady;balanced   Activities of Daily Living   Hygiene/Grooming independent   Oral Hygiene independent   Dress independent   Room Organization independent

## 2018-05-06 PROCEDURE — 12400007 ZZH R&B MH INTERMEDIATE UMMC

## 2018-05-06 PROCEDURE — 25000132 ZZH RX MED GY IP 250 OP 250 PS 637: Performed by: PSYCHIATRY & NEUROLOGY

## 2018-05-06 RX ADMIN — VITAMIN D, TAB 1000IU (100/BT) 2000 UNITS: 25 TAB at 08:57

## 2018-05-06 RX ADMIN — DIVALPROEX SODIUM 2000 MG: 500 TABLET, EXTENDED RELEASE ORAL at 21:34

## 2018-05-06 RX ADMIN — CYANOCOBALAMIN TAB 1000 MCG 1000 MCG: 1000 TAB at 08:57

## 2018-05-06 RX ADMIN — LISINOPRIL 20 MG: 20 TABLET ORAL at 08:57

## 2018-05-06 RX ADMIN — MIRTAZAPINE 15 MG: 15 TABLET, FILM COATED ORAL at 21:34

## 2018-05-06 RX ADMIN — NICOTINE 1 PATCH: 21 PATCH, EXTENDED RELEASE TRANSDERMAL at 21:30

## 2018-05-06 RX ADMIN — OLANZAPINE 10 MG: 10 TABLET, FILM COATED ORAL at 10:14

## 2018-05-06 ASSESSMENT — ACTIVITIES OF DAILY LIVING (ADL)
DRESS: INDEPENDENT
ORAL_HYGIENE: INDEPENDENT
DRESS: INDEPENDENT
GROOMING: INDEPENDENT
LAUNDRY: UNABLE TO COMPLETE
ORAL_HYGIENE: INDEPENDENT
GROOMING: INDEPENDENT

## 2018-05-06 NOTE — PLAN OF CARE
Problem: Decreased Participation/Engagement (Depressive Signs/Symptoms) (Adult)  Goal: Increased Participation/Engagement (Depressive Signs/Symptoms)  Outcome: No Change   05/05/18 2034   Increased Participation/Engagement (Depressive Signs/Symptoms)   Increased Participation/Engagement Time Frame for Action Step (STG) 3 days   Increased Participation/Engagement Action Step (STG) Outcome unable to achieve outcome     Pt isolated to his room for the majority of the evening. He states that he is not suicidal, but does not have interest in talking with the other patients on the unit. Pt is pleasant and calm on the unit.     Pt states that he is frustrated his sister doesn't show him support, and brings up his past behaviors. However, pt states that he is hopeful for treatment, and that is his main goal right now.    Pt denies medication side effects. Will continue to assess and offer support.

## 2018-05-06 NOTE — PROGRESS NOTES
05/06/18 1415   Behavioral Health   Hallucinations denies / not responding to hallucinations   Thinking poor concentration;distractable   Orientation person: oriented;place: oriented;date: oriented   Memory baseline memory   Insight admits / accepts   Judgement impaired   Eye Contact at examiner   Affect blunted, flat   Mood mood is calm;anxious  (anxiety episode in late morning)   Physical Appearance/Attire attire appropriate to age and situation   Hygiene well groomed   Suicidality chronic thoughts with no stated plan  (Denies)   1. Wish to be Dead No   2. Non-Specific Active Suicidal Thoughts  No   Self Injury other (see comment)  (denies)   Elopement (nothign to report)   Activity isolative;withdrawn  (minor social interaction in AM)   Speech clear;coherent   Medication Sensitivity no observed side effects   Psychomotor / Gait balanced;steady   Psycho Education   Type of Intervention 1:1 intervention   Response participates, initiates socially appropriate   Hours 0.5   Treatment Detail (Check In)   Activities of Daily Living   Hygiene/Grooming independent   Oral Hygiene independent   Dress independent   Room Organization independent   Patient was out of his room during the morning part of the shift and was having minor social interactions.  The patient was out for both meals.  In the last morning the patient reports having an episode of increased anxiety, during which he reached out to his nurse and took a medication to help.  The patient reports this made him tired so he took a nap most of the rest of the shift, but did come out for lunch.  The patient denies SI and SiB.  The patient remained hopeful about going to CD treatment and his goal today was to maintain that positive outlook. The patient he can do this but being social and staying out of his room and the patient was meeting this goal until he was tired from his medication.

## 2018-05-07 PROCEDURE — 99231 SBSQ HOSP IP/OBS SF/LOW 25: CPT | Performed by: PSYCHIATRY & NEUROLOGY

## 2018-05-07 PROCEDURE — 12400007 ZZH R&B MH INTERMEDIATE UMMC

## 2018-05-07 PROCEDURE — 25000132 ZZH RX MED GY IP 250 OP 250 PS 637: Performed by: PSYCHIATRY & NEUROLOGY

## 2018-05-07 RX ADMIN — ALUMINUM HYDROXIDE, MAGNESIUM HYDROXIDE, AND DIMETHICONE 30 ML: 400; 400; 40 SUSPENSION ORAL at 20:58

## 2018-05-07 RX ADMIN — NICOTINE 1 PATCH: 21 PATCH, EXTENDED RELEASE TRANSDERMAL at 16:27

## 2018-05-07 RX ADMIN — DIVALPROEX SODIUM 2000 MG: 500 TABLET, EXTENDED RELEASE ORAL at 20:56

## 2018-05-07 RX ADMIN — VITAMIN D, TAB 1000IU (100/BT) 2000 UNITS: 25 TAB at 09:19

## 2018-05-07 RX ADMIN — LISINOPRIL 20 MG: 20 TABLET ORAL at 09:19

## 2018-05-07 RX ADMIN — OLANZAPINE 10 MG: 10 TABLET, FILM COATED ORAL at 10:03

## 2018-05-07 RX ADMIN — MIRTAZAPINE 15 MG: 15 TABLET, FILM COATED ORAL at 20:56

## 2018-05-07 RX ADMIN — CYANOCOBALAMIN TAB 1000 MCG 1000 MCG: 1000 TAB at 09:19

## 2018-05-07 RX ADMIN — OLANZAPINE 10 MG: 10 TABLET, FILM COATED ORAL at 16:29

## 2018-05-07 ASSESSMENT — ACTIVITIES OF DAILY LIVING (ADL)
ORAL_HYGIENE: INDEPENDENT
LAUNDRY: WITH SUPERVISION
DRESS: STREET CLOTHES
GROOMING: INDEPENDENT
DRESS: INDEPENDENT;STREET CLOTHES;SCRUBS (BEHAVIORAL HEALTH)
LAUNDRY: WITH SUPERVISION
ORAL_HYGIENE: INDEPENDENT
GROOMING: INDEPENDENT

## 2018-05-07 NOTE — PROGRESS NOTES
05/07/18 1428   Behavioral Health   Hallucinations denies / not responding to hallucinations   Thinking poor concentration   Orientation time: oriented;date: oriented;place: oriented;person: oriented   Memory baseline memory   Insight admits / accepts   Judgement impaired   Eye Contact at examiner   Affect full range affect   Mood irritable   Physical Appearance/Attire attire appropriate to age and situation   Hygiene well groomed   Suicidality other (see comments)  (Denies)   1. Wish to be Dead No   Wish to be Dead Description NO   2. Non-Specific Active Suicidal Thoughts  No   Non-Specific Active Suicidal Thought Description No   Self Injury other (see comment)  (Denies)   Activity isolative;withdrawn   Speech clear;coherent   Medication Sensitivity no observed side effects   Psychomotor / Gait steady;balanced   Psycho Education   Type of Intervention 1:1 intervention   Response participates, initiates socially appropriate   Hours 0.5   Activities of Daily Living   Hygiene/Grooming independent   Oral Hygiene independent   Dress street clothes   Laundry with supervision   Room Organization independent   Activity   Activity Assistance Provided independent   Pt spent most of his time in room sleeping and he comes out for meals. He reports doing well and states to the writer that he needs to get to treatment. He reports he does not belong in the hospital. He did not attend any groups and denies suicidal ideations.

## 2018-05-07 NOTE — PROGRESS NOTES
05/06/18 2246   Behavioral Health   Hallucinations denies / not responding to hallucinations   Thinking poor concentration   Orientation person: oriented;place: oriented;date: oriented;time: oriented   Memory baseline memory   Insight admits / accepts   Judgement impaired   Eye Contact at examiner   Affect full range affect   Mood mood is calm   Physical Appearance/Attire attire appropriate to age and situation   Hygiene well groomed   Suicidality other (see comments)  (None stated or observed)   1. Wish to be Dead No   2. Non-Specific Active Suicidal Thoughts  No   Change in Protective Factors? No   Enviromental Risk Factors None   Self Injury other (see comment)  (None stated or observed)   Activity other (see comment)  (Active in milieu)   Speech clear;coherent   Medication Sensitivity no stated side effects;no observed side effects   Psychomotor / Gait balanced;steady   Activities of Daily Living   Hygiene/Grooming independent   Oral Hygiene independent   Dress independent   Laundry unable to complete   Room Organization independent     Pt. Was active in the milieu and social with peers throughout the beginning on the evening shift to about 2000 when he went to bed. His affect was full range and often bright and he was very kind and open to this writer, other staff, and his peers. He has no reported SISIB.

## 2018-05-08 PROCEDURE — 25000132 ZZH RX MED GY IP 250 OP 250 PS 637: Performed by: PSYCHIATRY & NEUROLOGY

## 2018-05-08 PROCEDURE — 12400007 ZZH R&B MH INTERMEDIATE UMMC

## 2018-05-08 RX ADMIN — VITAMIN D, TAB 1000IU (100/BT) 2000 UNITS: 25 TAB at 08:16

## 2018-05-08 RX ADMIN — DIVALPROEX SODIUM 2000 MG: 500 TABLET, EXTENDED RELEASE ORAL at 19:17

## 2018-05-08 RX ADMIN — CYANOCOBALAMIN TAB 1000 MCG 1000 MCG: 1000 TAB at 08:16

## 2018-05-08 RX ADMIN — OLANZAPINE 10 MG: 10 TABLET, FILM COATED ORAL at 18:36

## 2018-05-08 RX ADMIN — LISINOPRIL 20 MG: 20 TABLET ORAL at 08:16

## 2018-05-08 RX ADMIN — ALUMINUM HYDROXIDE, MAGNESIUM HYDROXIDE, AND DIMETHICONE 30 ML: 400; 400; 40 SUSPENSION ORAL at 19:46

## 2018-05-08 RX ADMIN — MIRTAZAPINE 15 MG: 15 TABLET, FILM COATED ORAL at 19:18

## 2018-05-08 RX ADMIN — NICOTINE 1 PATCH: 21 PATCH, EXTENDED RELEASE TRANSDERMAL at 19:18

## 2018-05-08 ASSESSMENT — ACTIVITIES OF DAILY LIVING (ADL)
ORAL_HYGIENE: INDEPENDENT
DRESS: INDEPENDENT
HYGIENE/GROOMING: INDEPENDENT
ORAL_HYGIENE: INDEPENDENT
DRESS: STREET CLOTHES
LAUNDRY: WITH SUPERVISION
GROOMING: INDEPENDENT

## 2018-05-08 NOTE — PROGRESS NOTES
Pt reported feeling anxious and agitated, and requested prn for anxiety. This writer suggested hydroxyzine, but writer stated that this medication does not work for him. Requested Zyprexa, as he states this works more effectively. Pt given 10 mg Zyprexa prn. Will continue to assess.

## 2018-05-08 NOTE — PLAN OF CARE
Problem: Mood Impairment (Depressive Signs/Symptoms) (Adult)  Goal: Improved Mood Symptoms (Depressive Signs/Symptoms)  Outcome: Improving  48 Hour Assessment    Pt visible in milieu on and off throughout the evening. He states that he can't sit still for very long, and doesn't enjoy watching television, which limits his time out in the lounge. He is pleasant upon approach, and displays a full range of affect when interacting with others. He is somewhat tangential in speech, but generally can stick to a topic of conversation. Pt became tearful when speaking about his sister, who he feels is unsupportive of him. Is hopeful for treatment, but states being concerned regarding a full commitment being filed. However, he is still set on going to treatment.      SI/SIB: Pt denies.    Auditory/Visual Hallucinations: Pt denies. Does not appear responding.    Pt took medications without issue. No additional concerns at this time. Will continue to assess and offer support.

## 2018-05-08 NOTE — PROGRESS NOTES
"[facility], [place]   Psychiatric Progress Note        Interim History:     The patient's care was discussed with the treatment team during the daily team meeting and/or staff's chart notes were reviewed.  Staff report patient spent more time outside of his room. He was reported to be pleasant during last weekend, compliant with meds. He was more irritated, even, angry during today's visit (he found out that his PD is being revoked). He talked angrily about his CM who initiated this revocation, stated he didn't understand why getting him into MICD treatment was taking so long time. \"Why can't you get me into [facility]\"? I tried to explain that [facility] didn't have beds and making arrangements for MICD treatment also needed time - he was minimally receptive.          Medications:       cholecalciferol  2,000 Units Oral Daily     cyanocobalamin  1,000 mcg Oral Daily     divalproex sodium extended-release  2,000 mg Oral At Bedtime     lisinopril  20 mg Oral Daily     mirtazapine  15 mg Oral At Bedtime     nicotine  1 patch Transdermal Daily     nicotine   Transdermal Q8H     nicotine   Transdermal Daily     [START ON 5/18/2018] paliperidone  156 mg Intramuscular Q28 Days          Allergies:   No Known Allergies       Labs:   No results found for this or any previous visit (from the past 24 hour(s)).       Psychiatric Examination:     BP (!) 122/95  Pulse 97  Temp 97.5  F (36.4  C) (Oral)  Resp 16  Ht 1.829 m (6')  Wt 108.4 kg (239 lb)  BMI 32.41 kg/m2  Weight is 239 lbs 0 oz  Body mass index is 32.41 kg/(m^2).                  Sitting Orthostatic BP: 147/102         Standing Orthostatic BP: 121/84     Appearance: awake, alert and dressed in hospital scrubs  Attitude:  cooperative  Eye Contact:  fair  Mood:  anxious  Affect:  constricted mobility  Speech:  clear, coherent  Psychomotor Behavior:  no evidence of tardive dyskinesia, dystonia, or tics  Throught Process:  logical and " linear  Associations:  no loose associations  Thought Content:  no evidence of suicidal ideation or homicidal ideation and no evidence of psychotic thought  Insight:  fair  Judgement:  fair  Oriented to:  time, person, and place  Attention Span and Concentration:  fair  Recent and Remote Memory:  fair    Clinical Global Impressions  First:  Considering your total clinical experience with this particular patient population, how severe are the patient's symptoms at this time?: 6 (05/03/18 1825)  Compared to the patient's condition at the START of treatment, this patient's condition is:: 4 (05/03/18 1825)  Most recent:  Considering your total clinical experience with this particular patient population, how severe are the patient's symptoms at this time?: 6 (05/03/18 1825)  Compared to the patient's condition at the START of treatment, this patient's condition is:: 4 (05/03/18 1825)    # Pain Assessment:  Current Pain Score 5/7/2018   Patient currently in pain? no   Pain score (0-10) -   Nabor lockett pain level was assessed and he currently denies pain.             Precautions:     Behavioral Orders   Procedures     Code 1 - Restrict to Unit     Routine Programming     As clinically indicated     Status 15     Every 15 minutes.     Suicide precautions     Patients on Suicide Precautions should have a Combination Diet ordered that includes a Diet selection(s) AND a Behavioral Tray selection for Safe Tray - with utensils, or Safe Tray - NO utensils            DIagnoses:     Bipolar affective disorder, mixed, history of diagnosis of schizoaffective disorder and schizophrenia as well, history of cannabis use disorder, rule out stimulant use disorder.          Plan:   No medication changes today. May need to be transferred to [facility] [place] [place]. Plan is for MICD treatment.  PD is being revoked per CTC.

## 2018-05-09 PROCEDURE — 12400007 ZZH R&B MH INTERMEDIATE UMMC

## 2018-05-09 PROCEDURE — 25000132 ZZH RX MED GY IP 250 OP 250 PS 637: Performed by: PSYCHIATRY & NEUROLOGY

## 2018-05-09 PROCEDURE — 99232 SBSQ HOSP IP/OBS MODERATE 35: CPT | Performed by: PSYCHIATRY & NEUROLOGY

## 2018-05-09 RX ORDER — PALIPERIDONE 6 MG/1
6 TABLET, EXTENDED RELEASE ORAL DAILY
Status: DISCONTINUED | OUTPATIENT
Start: 2018-05-09 | End: 2018-05-11

## 2018-05-09 RX ADMIN — VITAMIN D, TAB 1000IU (100/BT) 2000 UNITS: 25 TAB at 08:06

## 2018-05-09 RX ADMIN — DIVALPROEX SODIUM 2000 MG: 500 TABLET, EXTENDED RELEASE ORAL at 20:05

## 2018-05-09 RX ADMIN — OLANZAPINE 10 MG: 10 TABLET, FILM COATED ORAL at 14:29

## 2018-05-09 RX ADMIN — LISINOPRIL 20 MG: 20 TABLET ORAL at 08:06

## 2018-05-09 RX ADMIN — OLANZAPINE 10 MG: 10 TABLET, FILM COATED ORAL at 16:47

## 2018-05-09 RX ADMIN — NICOTINE 1 PATCH: 21 PATCH, EXTENDED RELEASE TRANSDERMAL at 20:04

## 2018-05-09 RX ADMIN — MIRTAZAPINE 15 MG: 15 TABLET, FILM COATED ORAL at 20:05

## 2018-05-09 RX ADMIN — ALUMINUM HYDROXIDE, MAGNESIUM HYDROXIDE, AND DIMETHICONE 30 ML: 400; 400; 40 SUSPENSION ORAL at 10:23

## 2018-05-09 RX ADMIN — CYANOCOBALAMIN TAB 1000 MCG 1000 MCG: 1000 TAB at 08:06

## 2018-05-09 RX ADMIN — ALUMINUM HYDROXIDE, MAGNESIUM HYDROXIDE, AND DIMETHICONE 30 ML: 400; 400; 40 SUSPENSION ORAL at 23:19

## 2018-05-09 ASSESSMENT — ACTIVITIES OF DAILY LIVING (ADL)
ORAL_HYGIENE: INDEPENDENT
DRESS: SCRUBS (BEHAVIORAL HEALTH)
GROOMING: INDEPENDENT
LAUNDRY: WITH SUPERVISION

## 2018-05-09 NOTE — PLAN OF CARE
"Pt was meeting with the CTC in his room about his placement options, pt just stormed out of his room visibly agitated and loud.  Pt demanding to leave the unit and stated \"I am going to the streets.  I have friends out in the streets\". Pt continued to pace the halls demanding to leave the unit.  Pt agreed to take zyprexa prn 10 mg.   Staff tried talking to pt and finally pt agreed to go to his room.  Pt currently in his room.  Will continue to assess pt closely.   "

## 2018-05-09 NOTE — PROGRESS NOTES
[facility], [place]   Psychiatric Progress Note        Interim History:     The patient's care was discussed with the treatment team during the daily team meeting and/or staff's chart notes were reviewed.  Staff report patient spent more time outside of his room. He was reported to be pleasant - he was also pleasant during today's visit with me, but then, quickly escalated after being told that [facility] would not take him because of his diagnosis of schizophrenia. He stormed out of his room, was loud, demanded to leave this hospital stating that he would rather live on the streets. He, then, calmed down and went back to his room.          Medications:       cholecalciferol  2,000 Units Oral Daily     cyanocobalamin  1,000 mcg Oral Daily     divalproex sodium extended-release  2,000 mg Oral At Bedtime     lisinopril  20 mg Oral Daily     mirtazapine  15 mg Oral At Bedtime     nicotine  1 patch Transdermal Daily     nicotine   Transdermal Q8H     nicotine   Transdermal Daily     [START ON 5/18/2018] paliperidone  156 mg Intramuscular Q28 Days     paliperidone  6 mg Oral Daily          Allergies:   No Known Allergies       Labs:   No results found for this or any previous visit (from the past 24 hour(s)).       Psychiatric Examination:     BP (!) 122/95  Pulse 97  Temp 97.6  F (36.4  C) (Oral)  Resp 16  Ht 1.829 m (6')  Wt 108.4 kg (239 lb)  BMI 32.41 kg/m2  Weight is 239 lbs 0 oz  Body mass index is 32.41 kg/(m^2).                              Sitting Orthostatic BP: 143/78         Standing Orthostatic BP: 121/82      Appearance: awake, alert and dressed in hospital scrubs  Attitude:  cooperative  Eye Contact:  fair  Mood:  anxious  Affect:  constricted mobility  Speech:  clear, coherent  Psychomotor Behavior:  no evidence of tardive dyskinesia, dystonia, or tics  Throught Process:  logical and linear  Associations:  no loose associations  Thought Content:  no evidence of suicidal  ideation or homicidal ideation and no evidence of openly psychotic thoughts? it is possible, though, that patient has some paranoia.   Insight:  fair  Judgement:  fair  Oriented to:  time, person, and place  Attention Span and Concentration:  fair  Recent and Remote Memory:  fair    Clinical Global Impressions  First:  Considering your total clinical experience with this particular patient population, how severe are the patient's symptoms at this time?: 6 (05/03/18 1825)  Compared to the patient's condition at the START of treatment, this patient's condition is:: 4 (05/03/18 1825)  Most recent:  Considering your total clinical experience with this particular patient population, how severe are the patient's symptoms at this time?: 6 (05/03/18 1825)  Compared to the patient's condition at the START of treatment, this patient's condition is:: 4 (05/03/18 1825)    # Pain Assessment:  Current Pain Score 5/9/2018   Patient currently in pain? denies   Pain score (0-10) -   Nabor lockett pain level was assessed and he currently denies pain.             Precautions:     Behavioral Orders   Procedures     Code 1 - Restrict to Unit     Routine Programming     As clinically indicated     Status 15     Every 15 minutes.     Suicide precautions     Patients on Suicide Precautions should have a Combination Diet ordered that includes a Diet selection(s) AND a Behavioral Tray selection for Safe Tray - with utensils, or Safe Tray - NO utensils            DIagnoses:     Bipolar affective disorder, mixed, history of diagnosis of schizoaffective disorder and schizophrenia as well, history of cannabis use disorder, rule out stimulant use disorder.          Plan:   Will recheck Valproic Acid level tomorrow. Will start on oral Invega in addition to Sustenna. May need to be transferred to [facility]. Plan is for MICD treatment.  PD is being revoked per CTC.

## 2018-05-09 NOTE — PROGRESS NOTES
Writer called [facility] to discuss referral. Pt has not been accepted as the intake person needs to review the referral. This should be done today.    Ophelia from [facility] called to say pt will not be accepted into their program due to a diagnosis of schizophrenia. Writer contacted  for other referral options. Kay will refer pt to the [facility].     Pt was very upset that he was denied.

## 2018-05-09 NOTE — PROGRESS NOTES
05/09/18 1344   Behavioral Health   Hallucinations denies / not responding to hallucinations   Thinking distractable   Orientation time: oriented;date: oriented;place: oriented;person: oriented   Memory baseline memory   Insight admits / accepts   Judgement impaired   Eye Contact at examiner   Affect full range affect   Mood mood is calm   Physical Appearance/Attire appears stated age   Hygiene well groomed   Suicidality other (see comments)  (Denies)   1. Wish to be Dead No   Wish to be Dead Description NO   2. Non-Specific Active Suicidal Thoughts  No   Non-Specific Active Suicidal Thought Description No   Self Injury other (see comment)  (Denies)   Activity other (see comment)  (Out for meals and social when out in the lounge.)   Speech coherent;clear   Medication Sensitivity no stated side effects   Psychomotor / Gait steady;balanced   Psycho Education   Type of Intervention 1:1 intervention   Response participates, initiates socially appropriate   Hours 0.5   Activities of Daily Living   Hygiene/Grooming independent   Oral Hygiene independent   Dress scrubs (behavioral health)   Laundry with supervision   Room Organization independent   Activity   Activity Assistance Provided independent   Pt spent in the room sleeping and out for meals. He stated that he has to be waiting for two weeks to get to [facility]. He has been cooperative and pleasant on approach. He denies suicidal ideation/SIB or hearing voices.

## 2018-05-10 LAB — VALPROATE SERPL-MCNC: 99 MG/L (ref 50–100)

## 2018-05-10 PROCEDURE — 25000132 ZZH RX MED GY IP 250 OP 250 PS 637: Performed by: PSYCHIATRY & NEUROLOGY

## 2018-05-10 PROCEDURE — 36415 COLL VENOUS BLD VENIPUNCTURE: CPT | Performed by: PSYCHIATRY & NEUROLOGY

## 2018-05-10 PROCEDURE — 80164 ASSAY DIPROPYLACETIC ACD TOT: CPT | Performed by: PSYCHIATRY & NEUROLOGY

## 2018-05-10 PROCEDURE — 99231 SBSQ HOSP IP/OBS SF/LOW 25: CPT | Performed by: PSYCHIATRY & NEUROLOGY

## 2018-05-10 PROCEDURE — 12400007 ZZH R&B MH INTERMEDIATE UMMC

## 2018-05-10 RX ORDER — IBUPROFEN 600 MG/1
600 TABLET, FILM COATED ORAL EVERY 6 HOURS PRN
Status: DISCONTINUED | OUTPATIENT
Start: 2018-05-10 | End: 2018-06-15 | Stop reason: HOSPADM

## 2018-05-10 RX ORDER — CARBOXYMETHYLCELLULOSE SODIUM 5 MG/ML
1 SOLUTION/ DROPS OPHTHALMIC 3 TIMES DAILY PRN
Status: DISCONTINUED | OUTPATIENT
Start: 2018-05-10 | End: 2018-06-15 | Stop reason: HOSPADM

## 2018-05-10 RX ADMIN — DIVALPROEX SODIUM 2000 MG: 500 TABLET, EXTENDED RELEASE ORAL at 19:57

## 2018-05-10 RX ADMIN — CARBOXYMETHYLCELLULOSE SODIUM 1 DROP: 5 SOLUTION/ DROPS OPHTHALMIC at 19:56

## 2018-05-10 RX ADMIN — NICOTINE 1 PATCH: 21 PATCH, EXTENDED RELEASE TRANSDERMAL at 19:57

## 2018-05-10 RX ADMIN — ALUMINUM HYDROXIDE, MAGNESIUM HYDROXIDE, AND DIMETHICONE 30 ML: 400; 400; 40 SUSPENSION ORAL at 04:05

## 2018-05-10 RX ADMIN — LISINOPRIL 20 MG: 20 TABLET ORAL at 07:59

## 2018-05-10 RX ADMIN — MELATONIN TAB 3 MG 3 MG: 3 TAB at 01:05

## 2018-05-10 RX ADMIN — MELATONIN TAB 3 MG 3 MG: 3 TAB at 22:12

## 2018-05-10 RX ADMIN — VITAMIN D, TAB 1000IU (100/BT) 2000 UNITS: 25 TAB at 07:59

## 2018-05-10 RX ADMIN — PALIPERIDONE 6 MG: 6 TABLET, EXTENDED RELEASE ORAL at 07:59

## 2018-05-10 RX ADMIN — CYANOCOBALAMIN TAB 1000 MCG 1000 MCG: 1000 TAB at 08:01

## 2018-05-10 RX ADMIN — ACETAMINOPHEN 650 MG: 325 TABLET ORAL at 01:05

## 2018-05-10 RX ADMIN — OLANZAPINE 10 MG: 10 TABLET, FILM COATED ORAL at 10:36

## 2018-05-10 RX ADMIN — ALUMINUM HYDROXIDE, MAGNESIUM HYDROXIDE, AND DIMETHICONE 30 ML: 400; 400; 40 SUSPENSION ORAL at 16:47

## 2018-05-10 RX ADMIN — IBUPROFEN 600 MG: 600 TABLET ORAL at 19:56

## 2018-05-10 RX ADMIN — MIRTAZAPINE 15 MG: 15 TABLET, FILM COATED ORAL at 19:57

## 2018-05-10 ASSESSMENT — ACTIVITIES OF DAILY LIVING (ADL)
ORAL_HYGIENE: INDEPENDENT
DRESS: INDEPENDENT
GROOMING: INDEPENDENT
ORAL_HYGIENE: INDEPENDENT
DRESS: SCRUBS (BEHAVIORAL HEALTH)
GROOMING: INDEPENDENT
LAUNDRY: WITH SUPERVISION

## 2018-05-10 NOTE — PROGRESS NOTES
"Nabor became highly agitated and angry when he couldn't get a soda from his locker during the chaplains group.  This was also after an explosive incident with another patient.  He requested and received an olanzapine 10 mg po.  Later he wanted to turn in a 12 hour notice because this place is \"fucked up\".  This RN reminded him he is on a court hold and could not turn in a 12 hour intent.  He was not happy about this.  "

## 2018-05-10 NOTE — PROGRESS NOTES
Nabor approached the desk stating he has right-sided throat pain and right-sided ear pain which began tonight.  States he had similar sxs about two weeks ago.  Patient accepted Tylenol 650 mg for the discomfort and Melatonin for sleep.  Also using a warm pack for comfort. Will continue to support and monitor.

## 2018-05-10 NOTE — PROGRESS NOTES
05/10/18 1447   Behavioral Health   Hallucinations denies / not responding to hallucinations   Thinking poor concentration   Orientation person: oriented;place: oriented;date: oriented   Memory baseline memory   Insight poor   Judgement impaired   Eye Contact at examiner   Affect irritable   Mood depressed   Physical Appearance/Attire attire appropriate to age and situation   Hygiene well groomed   Suicidality other (see comments)  (Denies)   1. Wish to be Dead No   2. Non-Specific Active Suicidal Thoughts  No   Self Injury other (see comment)  (Denies)   Elopement (nothing to report)   Activity isolative;withdrawn   Speech coherent;clear   Medication Sensitivity no observed side effects   Psychomotor / Gait balanced;steady   Psycho Education   Type of Intervention 1:1 intervention   Response participates, initiates socially appropriate   Hours 0.5   Treatment Detail (Check In)   Activities of Daily Living   Hygiene/Grooming independent   Oral Hygiene independent   Dress independent   Room Organization independent   Patient was in his room most of the shift, but came out for fluid, snacks, and both meals.  The patient was not social with peers while out of his room.  The patient was irritable and was focusing on his length of stay.  The patient is frustrated about how long it is taking to get into a CD treatment program and was often seen muttering under his breath about his frustration.  The patient denies any SI or SiB.

## 2018-05-10 NOTE — PROGRESS NOTES
05/10/18 1753   Behavioral Health   Hallucinations denies / not responding to hallucinations   Thinking poor concentration   Orientation time: oriented;date: oriented;place: oriented   Memory baseline memory   Insight insight appropriate to situation;insight appropriate to events   Judgement impaired   Eye Contact at examiner   Affect blunted, flat   Mood mood is calm   Physical Appearance/Attire attire appropriate to age and situation   Hygiene neglected grooming - unclean body, hair, teeth   Suicidality (denied )   1. Wish to be Dead No   2. Non-Specific Active Suicidal Thoughts  No   Self Injury (denied )   Elopement (denied )   Activity withdrawn;isolative   Speech coherent;clear   Medication Sensitivity no stated side effects;no observed side effects   Psychomotor / Gait balanced   Psycho Education   Type of Intervention 1:1 intervention   Response participates, initiates socially appropriate   Hours 0.5   Activities of Daily Living   Hygiene/Grooming independent   Oral Hygiene independent   Dress scrubs (behavioral health)   Laundry with supervision   Room Organization independent   Activity   Activity Assistance Provided independent     Pt was calm and cooperative with blunted mood affect. He was isolated in his room for the majority part of the shift, but out of his room for snack, fluids, medications and dinner. He denied any paranoid thoughts,  psychotic symptoms and SI/SIB. Endorsed minor depression and anxiety. He is frustrated with the length of the wait in hospital until he goes to CD treatment. Appetite was good and sleeping well.

## 2018-05-10 NOTE — PROGRESS NOTES
[facility], [place]   Psychiatric Progress Note        Interim History:     The patient's care was discussed with the treatment team during the daily team meeting and/or staff's chart notes were reviewed.  Staff report patient spends a lot of time in his room. He reported feeling bored about his placement to  program taking so long time. Staff also reported that patient could very easily get agitated and even threatening over small things. He asks for prn Zyprexa and calms down. Agreed to start taking oral Paliperidone.         Medications:       cholecalciferol  2,000 Units Oral Daily     cyanocobalamin  1,000 mcg Oral Daily     divalproex sodium extended-release  2,000 mg Oral At Bedtime     lisinopril  20 mg Oral Daily     mirtazapine  15 mg Oral At Bedtime     nicotine  1 patch Transdermal Daily     nicotine   Transdermal Q8H     nicotine   Transdermal Daily     [START ON 5/18/2018] paliperidone  156 mg Intramuscular Q28 Days     paliperidone  6 mg Oral Daily          Allergies:   No Known Allergies       Labs:     Recent Results (from the past 24 hour(s))   Valproic acid    Collection Time: 05/10/18  8:50 AM   Result Value Ref Range    Valproic Acid Level 99 50 - 100 mg/L          Psychiatric Examination:     /78 (BP Location: Left arm)  Pulse 74  Temp 97.6  F (36.4  C) (Oral)  Resp 16  Ht 1.829 m (6')  Wt 111.1 kg (245 lb)  BMI 33.23 kg/m2  Weight is 245 lbs 0 oz  Body mass index is 33.23 kg/(m^2).                                  Sitting Orthostatic BP: 157/103         Standing Orthostatic BP: 131/94      Appearance: awake, alert and dressed in hospital scrubs  Attitude:  cooperative  Eye Contact:  fair  Mood:  anxious  Affect:  constricted mobility  Speech:  clear, coherent  Psychomotor Behavior:  no evidence of tardive dyskinesia, dystonia, or tics  Throught Process:  logical and linear  Associations:  no loose associations  Thought Content:  no evidence of  suicidal ideation or homicidal ideation and no evidence of openly psychotic thoughts? it is possible, though, that patient has some paranoia.   Insight:  fair  Judgement:  fair  Oriented to:  time, person, and place  Attention Span and Concentration:  fair  Recent and Remote Memory:  fair    Clinical Global Impressions  First:  Considering your total clinical experience with this particular patient population, how severe are the patient's symptoms at this time?: 6 (05/03/18 1825)  Compared to the patient's condition at the START of treatment, this patient's condition is:: 4 (05/03/18 1825)  Most recent:  Considering your total clinical experience with this particular patient population, how severe are the patient's symptoms at this time?: 5 (5/10/2018)  Compared to the patient's condition at the START of treatment, this patient's condition is: 3 (5/10/2018)    # Pain Assessment:  Current Pain Score 5/10/2018   Patient currently in pain? denies   Pain score (0-10) 6   Nabor s pain level was assessed and he currently denies pain.             Precautions:     Behavioral Orders   Procedures     Code 1 - Restrict to Unit     Routine Programming     As clinically indicated     Status 15     Every 15 minutes.     Suicide precautions     Patients on Suicide Precautions should have a Combination Diet ordered that includes a Diet selection(s) AND a Behavioral Tray selection for Safe Tray - with utensils, or Safe Tray - NO utensils            DIagnoses:     Bipolar affective disorder, mixed, history of diagnosis of schizoaffective disorder and schizophrenia as well, history of cannabis use disorder, rule out stimulant use disorder.          Plan:   Valproic Acid level today was 99. No medication changes today. May need to be transferred to [facility] [place]. Plan is for MICD treatment. PD is being revoked per CTC.

## 2018-05-10 NOTE — PROGRESS NOTES
Pt refused to take the Invega stating he gets the injection form at [facility].

## 2018-05-11 PROCEDURE — 90853 GROUP PSYCHOTHERAPY: CPT

## 2018-05-11 PROCEDURE — 25000132 ZZH RX MED GY IP 250 OP 250 PS 637: Performed by: PSYCHIATRY & NEUROLOGY

## 2018-05-11 PROCEDURE — 99232 SBSQ HOSP IP/OBS MODERATE 35: CPT | Performed by: PSYCHIATRY & NEUROLOGY

## 2018-05-11 PROCEDURE — 12400007 ZZH R&B MH INTERMEDIATE UMMC

## 2018-05-11 RX ORDER — PALIPERIDONE 9 MG/1
9 TABLET, EXTENDED RELEASE ORAL DAILY
Status: DISCONTINUED | OUTPATIENT
Start: 2018-05-12 | End: 2018-05-24

## 2018-05-11 RX ADMIN — MIRTAZAPINE 15 MG: 15 TABLET, FILM COATED ORAL at 20:15

## 2018-05-11 RX ADMIN — VITAMIN D, TAB 1000IU (100/BT) 2000 UNITS: 25 TAB at 08:14

## 2018-05-11 RX ADMIN — MELATONIN TAB 3 MG 3 MG: 3 TAB at 20:17

## 2018-05-11 RX ADMIN — OLANZAPINE 10 MG: 10 TABLET, FILM COATED ORAL at 11:12

## 2018-05-11 RX ADMIN — HYDROXYZINE HYDROCHLORIDE 25 MG: 25 TABLET ORAL at 11:12

## 2018-05-11 RX ADMIN — PALIPERIDONE 6 MG: 6 TABLET, EXTENDED RELEASE ORAL at 08:14

## 2018-05-11 RX ADMIN — ALUMINUM HYDROXIDE, MAGNESIUM HYDROXIDE, AND DIMETHICONE 30 ML: 400; 400; 40 SUSPENSION ORAL at 01:17

## 2018-05-11 RX ADMIN — DIVALPROEX SODIUM 2000 MG: 500 TABLET, EXTENDED RELEASE ORAL at 20:15

## 2018-05-11 RX ADMIN — LISINOPRIL 20 MG: 20 TABLET ORAL at 08:14

## 2018-05-11 RX ADMIN — CYANOCOBALAMIN TAB 1000 MCG 1000 MCG: 1000 TAB at 08:14

## 2018-05-11 RX ADMIN — NICOTINE 1 PATCH: 21 PATCH, EXTENDED RELEASE TRANSDERMAL at 20:15

## 2018-05-11 ASSESSMENT — ACTIVITIES OF DAILY LIVING (ADL)
ORAL_HYGIENE: INDEPENDENT
GROOMING: INDEPENDENT
LAUNDRY: WITH SUPERVISION
DRESS: INDEPENDENT

## 2018-05-11 NOTE — PROGRESS NOTES
" Patient was in room much of shift resting in bed.  Patient states he is in bed \"trying to pass time\".  Patient declines groups activities.  Patient denies SI/SIB.  Patient is irritable at times.  Patient was quite agitated after meeting with his psychiatrist.  Patient also slightly irritated when his needs are not met immediately.        05/11/18 1450   Behavioral Health   Hallucinations denies / not responding to hallucinations   Thinking poor concentration   Orientation person: oriented;place: oriented;date: oriented;time: oriented   Memory baseline memory   Insight poor   Judgement impaired   Eye Contact at examiner   Affect full range affect;irritable  (irritable when needs are not met immediately)   Mood irritable   Physical Appearance/Attire attire appropriate to age and situation   Hygiene well groomed   Suicidality other (see comments)  (denies)   1. Wish to be Dead No   2. Non-Specific Active Suicidal Thoughts  No   Self Injury other (see comment)  (denies)   Activity isolative;withdrawn   Speech clear;coherent   Medication Sensitivity no stated side effects   Psychomotor / Gait balanced;steady   Psycho Education   Type of Intervention 1:1 intervention   Response participates, initiates socially appropriate   Hours 0.5   Treatment Detail check-in     "

## 2018-05-11 NOTE — PLAN OF CARE
"Problem: Social/Occupational/Functional Impairment (Depressive Signs/Symptoms) (Adult)  Goal: Improved Social/Occupational/Functional Skills (Depressive Signs/Symptoms)    INITIAL OT ATTENDANCE    Pt attended 1 out of 2 OT groups offered. Pt actively participated in a structured occupational therapy group involving a self-reflecting, group leisure task. Pt appeared comfortable sharing positive past experiences and personal information with peers, and was respectful in listening and responding to peers. Shared that he had \"many great aunts and great uncles, but both my parents were only children, and I'm an only child, which gets pretty lonely.\" Calm and cooperative during this morning group. Social and talkative with peers throughout, and appeared to brighten with social interaction. Will continue to assess. More information needed to complete initial assessment at this time.        "

## 2018-05-11 NOTE — PLAN OF CARE
"Problem: Overarching Goals (Adult)  Goal: Optimized Coping Skills in Response to Life Stressors  Outcome: No Change  Nabor was angry following a meeting with his doctor.  He was swearing, saying staff made up stories about him and wanted as much medication as he could get.  He was given olanzapine 10 mg and hydroxyzine 25 mg.  While taking the hydroxyzine he started to complain that the hydroxyzine gives him nightmares.  Informed Nabor that this RN did not know that hydroxyzine gave him nightmares.  \"This is the worst place I have ever been, I can't believe this place. \"      "

## 2018-05-11 NOTE — PROGRESS NOTES
"[facility], [place]   Psychiatric Progress Note        Interim History:     The patient's care was discussed with the treatment team during the daily team meeting and/or staff's chart notes were reviewed.  Staff report patient spends a lot of time in his room. He reported feeling bored about his placement to  program taking so long time. I met with him today and informed him that getting agitated/loud and threatening would delay his discharge. He immediately escalated, blamed myself and our staff members for: \"making lies\" about him, didn't see any problems with his anger outbursts. He, then, stormed outside of his room and continued to fume about our meeting and negative qualities of staff.          Medications:       cholecalciferol  2,000 Units Oral Daily     cyanocobalamin  1,000 mcg Oral Daily     divalproex sodium extended-release  2,000 mg Oral At Bedtime     lisinopril  20 mg Oral Daily     mirtazapine  15 mg Oral At Bedtime     nicotine  1 patch Transdermal Daily     nicotine   Transdermal Q8H     nicotine   Transdermal Daily     [START ON 5/18/2018] paliperidone  156 mg Intramuscular Q28 Days     paliperidone  6 mg Oral Daily          Allergies:   No Known Allergies       Labs:     No results found for this or any previous visit (from the past 24 hour(s)).       Psychiatric Examination:     /78 (BP Location: Left arm)  Pulse 74  Temp 97.6  F (36.4  C) (Oral)  Resp 16  Ht 1.829 m (6')  Wt 111.1 kg (245 lb)  BMI 33.23 kg/m2  Weight is 245 lbs 0 oz  Body mass index is 33.23 kg/(m^2).                                    Sitting Orthostatic BP: 138/94         Standing Orthostatic BP: 118/83      Appearance: awake, alert and dressed in hospital scrubs  Attitude:  cooperative  Eye Contact:  fair  Mood:  anxious  Affect:  constricted mobility  Speech:  clear, coherent  Psychomotor Behavior:  no evidence of tardive dyskinesia, dystonia, or tics  Throught Process:  logical " and linear  Associations:  no loose associations  Thought Content:  no evidence of suicidal ideation or homicidal ideation and no evidence of openly psychotic thoughts? it is possible, though, that patient has some paranoia.   Insight: partial  Judgement:  fair  Oriented to:  time, person, and place  Attention Span and Concentration:  fair  Recent and Remote Memory:  fair    Clinical Global Impressions  First:  Considering your total clinical experience with this particular patient population, how severe are the patient's symptoms at this time?: 6 (05/03/18 1825)  Compared to the patient's condition at the START of treatment, this patient's condition is:: 4 (05/03/18 1825)  Most recent:  Considering your total clinical experience with this particular patient population, how severe are the patient's symptoms at this time?: 5 (5/10/2018)  Compared to the patient's condition at the START of treatment, this patient's condition is: 3 (5/10/2018)    # Pain Assessment:  Current Pain Score 5/11/2018   Patient currently in pain? denies   Pain score (0-10) -   Nabor s pain level was assessed and he currently denies pain.             Precautions:     Behavioral Orders   Procedures     Code 1 - Restrict to Unit     Routine Programming     As clinically indicated     Status 15     Every 15 minutes.     Suicide precautions     Patients on Suicide Precautions should have a Combination Diet ordered that includes a Diet selection(s) AND a Behavioral Tray selection for Safe Tray - with utensils, or Safe Tray - NO utensils            DIagnoses:     Bipolar affective disorder, mixed, history of diagnosis of schizoaffective disorder and schizophrenia as well, history of cannabis use disorder, rule out stimulant use disorder.          Plan:   Patient has clear problems controlling his temper, this could be also because of paranoia?! Will increase oral Invega dose. Clarify with outpatient team when patient is supposed to get his Invega  shot (he states it is today vs 5/18).  May need to be transferred to [facility] [place]. Plan is for MICD treatment. PD is being revoked per [facility].

## 2018-05-12 PROCEDURE — 12400007 ZZH R&B MH INTERMEDIATE UMMC

## 2018-05-12 PROCEDURE — 25000132 ZZH RX MED GY IP 250 OP 250 PS 637: Performed by: PSYCHIATRY & NEUROLOGY

## 2018-05-12 RX ADMIN — VITAMIN D, TAB 1000IU (100/BT) 2000 UNITS: 25 TAB at 08:01

## 2018-05-12 RX ADMIN — OLANZAPINE 10 MG: 10 TABLET, FILM COATED ORAL at 10:19

## 2018-05-12 RX ADMIN — LISINOPRIL 20 MG: 20 TABLET ORAL at 08:01

## 2018-05-12 RX ADMIN — TRAZODONE HYDROCHLORIDE 50 MG: 50 TABLET ORAL at 23:27

## 2018-05-12 RX ADMIN — NICOTINE 1 PATCH: 21 PATCH, EXTENDED RELEASE TRANSDERMAL at 20:23

## 2018-05-12 RX ADMIN — MELATONIN TAB 3 MG 3 MG: 3 TAB at 20:24

## 2018-05-12 RX ADMIN — ALUMINUM HYDROXIDE, MAGNESIUM HYDROXIDE, AND DIMETHICONE 30 ML: 400; 400; 40 SUSPENSION ORAL at 23:28

## 2018-05-12 RX ADMIN — HYDROXYZINE HYDROCHLORIDE 25 MG: 25 TABLET ORAL at 12:47

## 2018-05-12 RX ADMIN — MIRTAZAPINE 15 MG: 15 TABLET, FILM COATED ORAL at 20:24

## 2018-05-12 RX ADMIN — OLANZAPINE 10 MG: 10 TABLET, FILM COATED ORAL at 17:20

## 2018-05-12 RX ADMIN — CYANOCOBALAMIN TAB 1000 MCG 1000 MCG: 1000 TAB at 08:01

## 2018-05-12 RX ADMIN — IBUPROFEN 600 MG: 600 TABLET ORAL at 01:52

## 2018-05-12 RX ADMIN — DIVALPROEX SODIUM 2000 MG: 500 TABLET, EXTENDED RELEASE ORAL at 20:24

## 2018-05-12 RX ADMIN — PALIPERIDONE 9 MG: 9 TABLET, EXTENDED RELEASE ORAL at 08:01

## 2018-05-12 RX ADMIN — ALUMINUM HYDROXIDE, MAGNESIUM HYDROXIDE, AND DIMETHICONE 30 ML: 400; 400; 40 SUSPENSION ORAL at 19:20

## 2018-05-12 ASSESSMENT — ACTIVITIES OF DAILY LIVING (ADL)
ORAL_HYGIENE: INDEPENDENT
LAUNDRY: WITH SUPERVISION
DRESS: STREET CLOTHES
GROOMING: INDEPENDENT

## 2018-05-12 NOTE — PROGRESS NOTES
05/12/18 1342   Behavioral Health   Hallucinations denies / not responding to hallucinations   Thinking poor concentration   Orientation time: oriented;date: oriented;place: oriented;person: oriented   Memory baseline memory   Insight poor   Judgement impaired   Eye Contact at examiner   Affect full range affect   Mood mood is calm   Physical Appearance/Attire appears stated age   Hygiene well groomed   Suicidality other (see comments)  (Denies)   1. Wish to be Dead No   Wish to be Dead Description NO   Non-Specific Active Suicidal Thought Description No   Activity withdrawn   Speech clear;coherent   Medication Sensitivity no stated side effects   Psychomotor / Gait balanced;steady   Psycho Education   Type of Intervention 1:1 intervention   Response participates, initiates socially appropriate   Hours 0.5   Activities of Daily Living   Hygiene/Grooming independent   Oral Hygiene independent   Dress street clothes   Laundry with supervision   Room Organization independent   Activity   Activity Assistance Provided independent     Pt spent.most of his time sleeping and he comes out for meals. He states doing wel and waiting for placement. He denies suicidal ideation /SIB or showing any agitation.

## 2018-05-12 NOTE — PROGRESS NOTES
"Pt mostly in his room, withdrawn. Pt appears irritable and makes comments such as \" this place has done nothing for me\", \"im going to give this place a terrible review\". Pt will make this comments but be calm and not show any aggression. Pt denies SI and SIB. Pt has little hope about getting into a treatment place.      05/11/18 2210   Behavioral Health   Hallucinations denies / not responding to hallucinations   Thinking poor concentration   Orientation time: oriented;date: oriented;place: oriented;person: oriented   Memory baseline memory   Insight poor   Judgement impaired   Eye Contact at examiner   Affect full range affect   Mood irritable   Physical Appearance/Attire attire appropriate to age and situation   Hygiene well groomed   Suicidality (denies)   1. Wish to be Dead No   2. Non-Specific Active Suicidal Thoughts  No   Self Injury (denies)   Activity withdrawn   Speech coherent;clear   Medication Sensitivity no stated side effects   Psychomotor / Gait balanced   Psycho Education   Type of Intervention 1:1 intervention   Response participates, initiates socially appropriate   Hours 0.5   Activities of Daily Living   Hygiene/Grooming independent   Oral Hygiene independent   Dress independent   Laundry with supervision   Room Organization independent   Activity   Activity Assistance Provided independent     "

## 2018-05-13 PROCEDURE — 12400007 ZZH R&B MH INTERMEDIATE UMMC

## 2018-05-13 PROCEDURE — 25000132 ZZH RX MED GY IP 250 OP 250 PS 637: Performed by: PSYCHIATRY & NEUROLOGY

## 2018-05-13 RX ADMIN — OLANZAPINE 10 MG: 10 TABLET, FILM COATED ORAL at 11:03

## 2018-05-13 RX ADMIN — LISINOPRIL 20 MG: 20 TABLET ORAL at 08:02

## 2018-05-13 RX ADMIN — NICOTINE 1 PATCH: 21 PATCH, EXTENDED RELEASE TRANSDERMAL at 19:46

## 2018-05-13 RX ADMIN — CYANOCOBALAMIN TAB 1000 MCG 1000 MCG: 1000 TAB at 08:01

## 2018-05-13 RX ADMIN — DIVALPROEX SODIUM 2000 MG: 500 TABLET, EXTENDED RELEASE ORAL at 19:46

## 2018-05-13 RX ADMIN — IBUPROFEN 600 MG: 600 TABLET ORAL at 23:47

## 2018-05-13 RX ADMIN — VITAMIN D, TAB 1000IU (100/BT) 2000 UNITS: 25 TAB at 08:02

## 2018-05-13 RX ADMIN — MIRTAZAPINE 15 MG: 15 TABLET, FILM COATED ORAL at 19:46

## 2018-05-13 RX ADMIN — PALIPERIDONE 9 MG: 9 TABLET, EXTENDED RELEASE ORAL at 08:01

## 2018-05-13 ASSESSMENT — ACTIVITIES OF DAILY LIVING (ADL)
LAUNDRY: WITH SUPERVISION
DRESS: SCRUBS (BEHAVIORAL HEALTH)
GROOMING: INDEPENDENT
ORAL_HYGIENE: INDEPENDENT

## 2018-05-13 NOTE — PROGRESS NOTES
"   05/12/18 2100   Behavioral Health   Hallucinations denies / not responding to hallucinations   Thinking distractable;poor concentration   Orientation person: oriented;place: oriented;date: oriented   Memory baseline memory   Insight poor   Judgement impaired   Eye Contact at examiner   Affect full range affect   Mood anxious;labile   Physical Appearance/Attire appears stated age   Hygiene well groomed   Suicidality other (see comments)  (Pt denies)   1. Wish to be Dead No   2. Non-Specific Active Suicidal Thoughts  No   Activity hyperactive (agitated, impulsive)   Speech pressured;rambling   Medication Sensitivity no stated side effects   Psychomotor / Gait balanced;steady       Pt believes ADD/ADHD is his \"real problem\",  \"and the doctors don't get it\".  Pt napped on and off all evening shift, ate his meal and snack, but skipped the group meeting.  Denies SI/SIB.  Denies AH/VH.  Rates mood at 4, and anxiety \"high\".  "

## 2018-05-13 NOTE — PROGRESS NOTES
05/13/18 1428   Behavioral Health   Hallucinations denies / not responding to hallucinations   Thinking distractable   Orientation person: oriented;place: oriented   Insight insight appropriate to situation   Judgement impaired   Eye Contact at examiner   Affect other (see comments)  (NEUTRAL)   Mood mood is calm   Physical Appearance/Attire attire appropriate to age and situation   Suicidality other (see comments)  (denies)   1. Wish to be Dead No   2. Non-Specific Active Suicidal Thoughts  No   Self Injury other (see comment)  (None reported or observed)   Elopement (None observed)   Activity isolative   Speech rambling   Psychomotor / Gait balanced     Pt has been in and out of his room. Affect looked neutral. Reports he was tiered today, and wanted to rest more. Up for meals, and medications. Appearance is appropriate for his situation. When out, pt watched tv, socialized minimally, and attended community. Offers no concerns. Denies pain. Contracts for pilar.

## 2018-05-14 PROCEDURE — 12400007 ZZH R&B MH INTERMEDIATE UMMC

## 2018-05-14 PROCEDURE — 25000132 ZZH RX MED GY IP 250 OP 250 PS 637: Performed by: PSYCHIATRY & NEUROLOGY

## 2018-05-14 PROCEDURE — 99232 SBSQ HOSP IP/OBS MODERATE 35: CPT | Performed by: PSYCHIATRY & NEUROLOGY

## 2018-05-14 RX ORDER — OLANZAPINE 5 MG/1
5 TABLET ORAL 2 TIMES DAILY
Status: DISCONTINUED | OUTPATIENT
Start: 2018-05-14 | End: 2018-05-21

## 2018-05-14 RX ADMIN — VITAMIN D, TAB 1000IU (100/BT) 2000 UNITS: 25 TAB at 07:08

## 2018-05-14 RX ADMIN — HYDROXYZINE HYDROCHLORIDE 25 MG: 25 TABLET ORAL at 23:59

## 2018-05-14 RX ADMIN — PALIPERIDONE 9 MG: 9 TABLET, EXTENDED RELEASE ORAL at 07:09

## 2018-05-14 RX ADMIN — DIVALPROEX SODIUM 2000 MG: 500 TABLET, EXTENDED RELEASE ORAL at 19:12

## 2018-05-14 RX ADMIN — OLANZAPINE 10 MG: 10 TABLET, FILM COATED ORAL at 08:47

## 2018-05-14 RX ADMIN — OLANZAPINE 10 MG: 10 TABLET, FILM COATED ORAL at 14:44

## 2018-05-14 RX ADMIN — MIRTAZAPINE 15 MG: 15 TABLET, FILM COATED ORAL at 19:12

## 2018-05-14 RX ADMIN — ALUMINUM HYDROXIDE, MAGNESIUM HYDROXIDE, AND DIMETHICONE 30 ML: 400; 400; 40 SUSPENSION ORAL at 19:34

## 2018-05-14 RX ADMIN — CYANOCOBALAMIN TAB 1000 MCG 1000 MCG: 1000 TAB at 07:09

## 2018-05-14 RX ADMIN — OLANZAPINE 10 MG: 10 TABLET, FILM COATED ORAL at 12:27

## 2018-05-14 RX ADMIN — MELATONIN TAB 3 MG 3 MG: 3 TAB at 21:47

## 2018-05-14 RX ADMIN — LISINOPRIL 20 MG: 20 TABLET ORAL at 07:09

## 2018-05-14 ASSESSMENT — ACTIVITIES OF DAILY LIVING (ADL)
ORAL_HYGIENE: INDEPENDENT
ORAL_HYGIENE: INDEPENDENT
DRESS: STREET CLOTHES
LAUNDRY: WITH SUPERVISION
LAUNDRY: WITH SUPERVISION
GROOMING: INDEPENDENT
DRESS: STREET CLOTHES;INDEPENDENT
GROOMING: INDEPENDENT

## 2018-05-14 NOTE — PROGRESS NOTES
Pt took two doses of prn Zyprexa this shift, due to increasing agitation and anxiety. Pt appears restless and frustrated with his aftercare planning.  Will continue to offer support.

## 2018-05-14 NOTE — PROGRESS NOTES
05/13/18 2142   Behavioral Health   Hallucinations auditory   Thinking distractable   Orientation time: oriented;date: oriented;place: oriented;person: oriented   Memory baseline memory   Insight insight appropriate to situation   Judgement impaired   Eye Contact at examiner   Affect full range affect   Mood mood is calm   Physical Appearance/Attire appears stated age   Suicidality other (see comments)  (Denies)   1. Wish to be Dead No   Wish to be Dead Description NO   2. Non-Specific Active Suicidal Thoughts  No   Non-Specific Active Suicidal Thought Description No   Self Injury other (see comment)  (Denies)   Elopement Statements about wanting to leave   Activity withdrawn;isolative  (But social when he out in lounge)   Speech clear;coherent   Medication Sensitivity no stated side effects   Psychomotor / Gait balanced;steady   Psycho Education   Type of Intervention 1:1 intervention   Response participates, initiates socially appropriate   Hours 0.5   Activities of Daily Living   Hygiene/Grooming independent   Oral Hygiene independent   Dress scrubs (behavioral health)   Laundry with supervision   Room Organization independent   Activity   Activity Assistance Provided independent   Pt observed in room sleeping most of the times and talks to self while sleeping. He was observed in the lounge and appears social with peers. He denies suicidal ideation/SIB but reports that he waiting for placement.

## 2018-05-14 NOTE — PROGRESS NOTES
[facility], [place]   Psychiatric Progress Note        Interim History:     The patient's care was discussed with the treatment team during the daily team meeting and/or staff's chart notes were reviewed.  Staff report patient was somewhat more cooperative and less irritable, though, over weekend requested and was given Zyprexa prn. He remains easily agitated about small things, admits that he has problems with temper and today apologized for his outburst with me on Friday. Cooperates with meds, doesn't appear to be overmedicated.         Medications:       cholecalciferol  2,000 Units Oral Daily     cyanocobalamin  1,000 mcg Oral Daily     divalproex sodium extended-release  2,000 mg Oral At Bedtime     lisinopril  20 mg Oral Daily     mirtazapine  15 mg Oral At Bedtime     nicotine  1 patch Transdermal Daily     nicotine   Transdermal Q8H     nicotine   Transdermal Daily     OLANZapine  5 mg Oral BID     [START ON 5/18/2018] paliperidone  156 mg Intramuscular Q28 Days     paliperidone  9 mg Oral Daily          Allergies:   No Known Allergies       Labs:     No results found for this or any previous visit (from the past 24 hour(s)).       Psychiatric Examination:     /78 (BP Location: Left arm)  Pulse 74  Temp 97.5  F (36.4  C) (Oral)  Resp 16  Ht 1.829 m (6')  Wt 111.1 kg (245 lb)  BMI 33.23 kg/m2  Weight is 245 lbs 0 oz  Body mass index is 33.23 kg/(m^2).                  Sitting Orthostatic BP: 149/107         Standing Orthostatic BP: 136/94      Appearance: awake, alert and dressed in hospital scrubs  Attitude:  cooperative  Eye Contact:  fair  Mood:  anxious  Affect:  constricted mobility  Speech:  clear, coherent  Psychomotor Behavior:  no evidence of tardive dyskinesia, dystonia, or tics  Throught Process:  logical and linear  Associations:  no loose associations  Thought Content:  no evidence of suicidal ideation or homicidal ideation and no evidence of openly  psychotic thoughts? it is possible, though, that patient has some paranoia.   Insight: partial  Judgement:  fair  Oriented to:  time, person, and place  Attention Span and Concentration:  fair  Recent and Remote Memory:  fair    Clinical Global Impressions  First:  Considering your total clinical experience with this particular patient population, how severe are the patient's symptoms at this time?: 6 (05/03/18 1825)  Compared to the patient's condition at the START of treatment, this patient's condition is:: 4 (05/03/18 1825)  Most recent:  Considering your total clinical experience with this particular patient population, how severe are the patient's symptoms at this time?: 5 (5/10/2018)  Compared to the patient's condition at the START of treatment, this patient's condition is: 3 (5/10/2018)    # Pain Assessment:  Current Pain Score 5/13/2018   Patient currently in pain? yes   Pain score (0-10) 8   Pain location Neck   Pain descriptors Luz lockett pain level was assessed, he periodically asks for Tylenol.             Precautions:     Behavioral Orders   Procedures     Code 1 - Restrict to Unit     Routine Programming     As clinically indicated     Status 15     Every 15 minutes.     Suicide precautions     Patients on Suicide Precautions should have a Combination Diet ordered that includes a Diet selection(s) AND a Behavioral Tray selection for Safe Tray - with utensils, or Safe Tray - NO utensils            DIagnoses:     Bipolar affective disorder, mixed, history of diagnosis of schizoaffective disorder and schizophrenia as well, history of cannabis use disorder, rule out stimulant use disorder.          Plan:   Patient has clear problems controlling his temper, this could be also because of paranoia?! Will start on scheduled Zyprexa.  May need to be transferred to [facility] [place]. Plan is for MICD treatment. PD is being revoked per [facility].

## 2018-05-14 NOTE — PROGRESS NOTES
05/14/18 1350   Behavioral Health   Hallucinations auditory   Thinking distractable   Orientation time: oriented;date: oriented;place: oriented;person: oriented   Memory baseline memory   Insight insight appropriate to situation   Judgement impaired   Eye Contact at examiner   Affect full range affect   Mood mood is calm   Physical Appearance/Attire appears stated age   Hygiene well groomed   Suicidality other (see comments)  (Denies)   1. Wish to be Dead No   Wish to be Dead Description NO   2. Non-Specific Active Suicidal Thoughts  No   Non-Specific Active Suicidal Thought Description No   Self Injury other (see comment)  (Denies)   Activity isolative   Speech coherent;clear   Medication Sensitivity no stated side effects   Psychomotor / Gait balanced;steady   Psycho Education   Type of Intervention 1:1 intervention   Response participates, initiates socially appropriate   Hours 0.5   Activities of Daily Living   Hygiene/Grooming independent   Oral Hygiene independent   Dress street clothes   Laundry with supervision   Room Organization independent   Activity   Activity Assistance Provided independent   Pt spent most of his time in room sleeping and he comes out for meals. He states that the medication is working. He denies suicidal ideation or hearing voices, He was observed in the lounge watching TV with other peers.

## 2018-05-15 PROCEDURE — 12400007 ZZH R&B MH INTERMEDIATE UMMC

## 2018-05-15 PROCEDURE — 25000132 ZZH RX MED GY IP 250 OP 250 PS 637: Performed by: PSYCHIATRY & NEUROLOGY

## 2018-05-15 RX ADMIN — OLANZAPINE 5 MG: 5 TABLET, FILM COATED ORAL at 21:35

## 2018-05-15 RX ADMIN — DIVALPROEX SODIUM 2000 MG: 500 TABLET, EXTENDED RELEASE ORAL at 21:35

## 2018-05-15 RX ADMIN — NICOTINE 1 PATCH: 21 PATCH, EXTENDED RELEASE TRANSDERMAL at 21:34

## 2018-05-15 RX ADMIN — LISINOPRIL 20 MG: 20 TABLET ORAL at 08:49

## 2018-05-15 RX ADMIN — CYANOCOBALAMIN TAB 1000 MCG 1000 MCG: 1000 TAB at 08:49

## 2018-05-15 RX ADMIN — OLANZAPINE 5 MG: 5 TABLET, FILM COATED ORAL at 08:49

## 2018-05-15 RX ADMIN — PALIPERIDONE 9 MG: 9 TABLET, EXTENDED RELEASE ORAL at 08:49

## 2018-05-15 RX ADMIN — VITAMIN D, TAB 1000IU (100/BT) 2000 UNITS: 25 TAB at 08:48

## 2018-05-15 RX ADMIN — MIRTAZAPINE 15 MG: 15 TABLET, FILM COATED ORAL at 21:36

## 2018-05-15 ASSESSMENT — ACTIVITIES OF DAILY LIVING (ADL)
LAUNDRY: WITH SUPERVISION
ORAL_HYGIENE: INDEPENDENT
DRESS: INDEPENDENT
GROOMING: INDEPENDENT

## 2018-05-15 NOTE — PLAN OF CARE
"Problem: Decreased Participation/Engagement (Depressive Signs/Symptoms) (Adult)  Goal: Increased Participation/Engagement (Depressive Signs/Symptoms)  48 Hour Assessment    Pt visible in milieu off and on throughout the evening. He was visibly irritable, and made statements that indicate frustration with his continued stay in the hospital. \"I don't know why they aren't treating my ADD. I'm just used to people taking advantage of me, so I'm not surprised.\" Pt was able to remain calm on unit, and was pleasant with this writer and fellow patients. Pt requested, and received, melatonin for sleep.     SI/SIB: Pt denies. Has not made gestures or comments that indicate suicidality.     Auditory/Visual Hallucinations: Pt denies. Does not appear responding.     Pt declined his scheduled Zyprexa, due to having taken two prn doses this afternoon. Took all other medications without issue.        "

## 2018-05-15 NOTE — PROGRESS NOTES
Pt isolative and withdrawn in room most of shift, sleeping. Pt irritable about being here at times. Pt distressed about being here and wants to leave but has no plan of trying to elope and agrees to be cooperative. Pt denies SI and SIB. Pt waiting for placement.     05/15/18 1227   Behavioral Health   Hallucinations denies / not responding to hallucinations   Thinking poor concentration   Orientation time: oriented;date: oriented;place: oriented;person: oriented   Memory baseline memory   Insight admits / accepts   Judgement impaired   Eye Contact at examiner   Affect blunted, flat   Mood irritable   Physical Appearance/Attire appears stated age   Hygiene well groomed   Suicidality (denies)   1. Wish to be Dead No   2. Non-Specific Active Suicidal Thoughts  No   Self Injury (denies)   Elopement Distress about legal situation (holds for mental health or criminal)   Activity isolative;withdrawn   Speech rambling   Medication Sensitivity no stated side effects   Psychomotor / Gait balanced   Psycho Education   Type of Intervention 1:1 intervention   Response participates with encouragement   Hours 0.5   Activities of Daily Living   Hygiene/Grooming independent   Oral Hygiene independent   Dress independent   Laundry with supervision   Room Organization independent

## 2018-05-16 PROCEDURE — 25000132 ZZH RX MED GY IP 250 OP 250 PS 637: Performed by: PSYCHIATRY & NEUROLOGY

## 2018-05-16 PROCEDURE — 12400007 ZZH R&B MH INTERMEDIATE UMMC

## 2018-05-16 PROCEDURE — 99232 SBSQ HOSP IP/OBS MODERATE 35: CPT | Performed by: PSYCHIATRY & NEUROLOGY

## 2018-05-16 RX ADMIN — OLANZAPINE 5 MG: 5 TABLET, FILM COATED ORAL at 08:52

## 2018-05-16 RX ADMIN — MELATONIN TAB 3 MG 3 MG: 3 TAB at 20:18

## 2018-05-16 RX ADMIN — DIVALPROEX SODIUM 2000 MG: 500 TABLET, EXTENDED RELEASE ORAL at 20:18

## 2018-05-16 RX ADMIN — MIRTAZAPINE 15 MG: 15 TABLET, FILM COATED ORAL at 20:18

## 2018-05-16 RX ADMIN — OLANZAPINE 5 MG: 5 TABLET, FILM COATED ORAL at 20:18

## 2018-05-16 RX ADMIN — NICOTINE 1 PATCH: 21 PATCH, EXTENDED RELEASE TRANSDERMAL at 20:19

## 2018-05-16 RX ADMIN — PALIPERIDONE 9 MG: 9 TABLET, EXTENDED RELEASE ORAL at 08:49

## 2018-05-16 RX ADMIN — ALUMINUM HYDROXIDE, MAGNESIUM HYDROXIDE, AND DIMETHICONE 30 ML: 400; 400; 40 SUSPENSION ORAL at 22:38

## 2018-05-16 RX ADMIN — CYANOCOBALAMIN TAB 1000 MCG 1000 MCG: 1000 TAB at 08:49

## 2018-05-16 RX ADMIN — VITAMIN D, TAB 1000IU (100/BT) 2000 UNITS: 25 TAB at 08:49

## 2018-05-16 RX ADMIN — OLANZAPINE 10 MG: 10 TABLET, FILM COATED ORAL at 13:32

## 2018-05-16 RX ADMIN — LISINOPRIL 20 MG: 20 TABLET ORAL at 08:49

## 2018-05-16 ASSESSMENT — ACTIVITIES OF DAILY LIVING (ADL)
ORAL_HYGIENE: INDEPENDENT
DRESS: INDEPENDENT
LAUNDRY: WITH SUPERVISION
DRESS: INDEPENDENT
HYGIENE/GROOMING: INDEPENDENT
ORAL_HYGIENE: INDEPENDENT
GROOMING: INDEPENDENT
LAUNDRY: UNABLE TO COMPLETE

## 2018-05-16 NOTE — PROGRESS NOTES
[facility], [place]   Psychiatric Progress Note        Interim History:     The patient's care was discussed with the treatment team during the daily team meeting and/or staff's chart notes were reviewed.  Staff report patient spent a lot of time in his bed, doesn't want to go to groups, takes little responsibility for his outbursts. This appears to be his longstanding personality trait, rather than sign of mental illness. Cooperates with meds, doesn't appear to be overmedicated.         Medications:       cholecalciferol  2,000 Units Oral Daily     cyanocobalamin  1,000 mcg Oral Daily     divalproex sodium extended-release  2,000 mg Oral At Bedtime     lisinopril  20 mg Oral Daily     mirtazapine  15 mg Oral At Bedtime     nicotine  1 patch Transdermal Daily     nicotine   Transdermal Q8H     nicotine   Transdermal Daily     OLANZapine  5 mg Oral BID     [START ON 5/18/2018] paliperidone  156 mg Intramuscular Q28 Days     paliperidone  9 mg Oral Daily          Allergies:   No Known Allergies       Labs:     No results found for this or any previous visit (from the past 24 hour(s)).       Psychiatric Examination:     BP (!) 150/101  Pulse 86  Temp 97.9  F (36.6  C) (Oral)  Resp 16  Ht 1.829 m (6')  Wt 111.1 kg (245 lb)  BMI 33.23 kg/m2  Weight is 245 lbs 0 oz  Body mass index is 33.23 kg/(m^2).                  Sitting Orthostatic BP: 149/107         Standing Orthostatic BP: 136/94      Appearance: awake, alert and dressed in hospital scrubs  Attitude:  cooperative  Eye Contact:  fair  Mood:  anxious  Affect:  constricted mobility  Speech:  clear, coherent  Psychomotor Behavior:  no evidence of tardive dyskinesia, dystonia, or tics  Throught Process:  logical and linear  Associations:  no loose associations  Thought Content:  no evidence of suicidal ideation or homicidal ideation and no evidence of openly psychotic thoughts? it is possible, though, that patient has some paranoia.    Insight: partial  Judgement:  fair  Oriented to:  time, person, and place  Attention Span and Concentration:  fair  Recent and Remote Memory:  fair    Clinical Global Impressions  First:  Considering your total clinical experience with this particular patient population, how severe are the patient's symptoms at this time?: 6 (05/03/18 1825)  Compared to the patient's condition at the START of treatment, this patient's condition is:: 4 (05/03/18 1825)  Most recent:  Considering your total clinical experience with this particular patient population, how severe are the patient's symptoms at this time?: 5 (5/10/2018)  Compared to the patient's condition at the START of treatment, this patient's condition is: 3 (5/10/2018)    # Pain Assessment:  Current Pain Score 5/16/2018   Patient currently in pain? denies   Pain score (0-10) -   Pain location -   Pain descriptors -   Nabor lockett pain level was assessed, he periodically asks for Tylenol.             Precautions:     Behavioral Orders   Procedures     Code 1 - Restrict to Unit     Routine Programming     As clinically indicated     Status 15     Every 15 minutes.     Suicide precautions     Patients on Suicide Precautions should have a Combination Diet ordered that includes a Diet selection(s) AND a Behavioral Tray selection for Safe Tray - with utensils, or Safe Tray - NO utensils            DIagnoses:     Bipolar affective disorder, mixed, history of diagnosis of schizoaffective disorder and schizophrenia as well, history of cannabis use disorder, rule out stimulant use disorder.          Plan:   Patient has clear problems controlling his temper, this could be also because of paranoia, but, also can be because of personality traits. Will continue scheduled Zyprexa.  May need to be transferred to [facility] [place]. Plan is for MICD treatment. PD is being revoked per CTC. Patient is fully committed.

## 2018-05-16 NOTE — PROGRESS NOTES
Writer faxed the updated notes to [facility] for their review.    Writer also spoke with pt regarding the notes which state he is irritable. He then got irritable but less than before.

## 2018-05-16 NOTE — PLAN OF CARE
"Problem: Mood Impairment (Depressive Signs/Symptoms) (Adult)  Goal: Improved Mood Symptoms (Depressive Signs/Symptoms)  Outcome: Improving  Pt isolates to his room all shift. Pt says it's because \"I don't need to be here or the groups, I'm only here for CD tx issues.\" Pt then approached writer later in the shift to say \"I don't know who is saying I'm irritable but fuck them, they're all fat and ugly.\" redirected pt from using foul language and talking about people in a negative way. Gave pt prn Zyprexa as requested by him. Pt denies all MH sx at this time. Will continue to monitor.       "

## 2018-05-17 PROCEDURE — 25000132 ZZH RX MED GY IP 250 OP 250 PS 637: Performed by: PSYCHIATRY & NEUROLOGY

## 2018-05-17 PROCEDURE — 12400007 ZZH R&B MH INTERMEDIATE UMMC

## 2018-05-17 PROCEDURE — 99232 SBSQ HOSP IP/OBS MODERATE 35: CPT | Performed by: PSYCHIATRY & NEUROLOGY

## 2018-05-17 RX ADMIN — PALIPERIDONE 9 MG: 9 TABLET, EXTENDED RELEASE ORAL at 08:11

## 2018-05-17 RX ADMIN — OLANZAPINE 5 MG: 5 TABLET, FILM COATED ORAL at 19:42

## 2018-05-17 RX ADMIN — VITAMIN D, TAB 1000IU (100/BT) 2000 UNITS: 25 TAB at 08:11

## 2018-05-17 RX ADMIN — OLANZAPINE 5 MG: 5 TABLET, FILM COATED ORAL at 08:11

## 2018-05-17 RX ADMIN — CYANOCOBALAMIN TAB 1000 MCG 1000 MCG: 1000 TAB at 08:10

## 2018-05-17 RX ADMIN — DIVALPROEX SODIUM 2000 MG: 500 TABLET, EXTENDED RELEASE ORAL at 19:41

## 2018-05-17 RX ADMIN — MIRTAZAPINE 15 MG: 15 TABLET, FILM COATED ORAL at 19:41

## 2018-05-17 RX ADMIN — NICOTINE 1 PATCH: 21 PATCH, EXTENDED RELEASE TRANSDERMAL at 19:42

## 2018-05-17 RX ADMIN — LISINOPRIL 20 MG: 20 TABLET ORAL at 08:11

## 2018-05-17 ASSESSMENT — ACTIVITIES OF DAILY LIVING (ADL)
DRESS: INDEPENDENT
GROOMING: INDEPENDENT
ORAL_HYGIENE: INDEPENDENT

## 2018-05-17 NOTE — PLAN OF CARE
"Problem: Mood Impairment (Depressive Signs/Symptoms) (Adult)  Goal: Improved Mood Symptoms (Depressive Signs/Symptoms)  Outcome: Improving  Pt was isolative and withdrawn in his room coming out in the milieu few times throughout the evening. Denies SI/SIB. Pt reports that he is anxious for being here for too long. Pt at one point came out and said he wanted to sign the 12 hour intent to leave form. Pt stated he is tired of being here ad wants to \"move on to treatment\". Pt stated he doesn't have patience and \"I have ADHD\". Pt was informed that he is committed and it explained what it means. Pt stated he was aware about it and said \"I am just frustrated with the system\". Pt was calm talking about his frustrations. Pt then decided he would just go and take a nap. Later pt came out for dinner. Cooperative with medications.      "

## 2018-05-17 NOTE — PROGRESS NOTES
[facility], [place]   Psychiatric Progress Note        Interim History:     The patient's care was discussed with the treatment team during the daily team meeting and/or staff's chart notes were reviewed.    Patient has a history of schizoaffective disorder vs Bipolar affective disorder and stimulant use disorder. Was admitted by his CM because of disorganized behavior and violating conditions of PD. PD was subsequently revoked. Patient still has problems controlling his temper, this seems to be a personality trait, less likely due to some underlying paranoia? He is compliant with meds, reports feeling bored with being here for so long. Staff report patient was somewhat more cooperative and less irritable, though, requested and was given Zyprexa prn. He remains easily agitated about small things, admits that he has problems with temper. Cooperates with meds, doesn't appear to be overmedicated. He is concerned about weight gain. I reminded him that Zyprexa and staying in bed for a long time could have contributed to weight gain. He indicated he understood, would try to spend more time out of his room and watch how much he eats. I informed him that starting tomorrow he will be seen by another provider.          Medications:       cholecalciferol  2,000 Units Oral Daily     cyanocobalamin  1,000 mcg Oral Daily     divalproex sodium extended-release  2,000 mg Oral At Bedtime     lisinopril  20 mg Oral Daily     mirtazapine  15 mg Oral At Bedtime     nicotine  1 patch Transdermal Daily     nicotine   Transdermal Q8H     nicotine   Transdermal Daily     OLANZapine  5 mg Oral BID     [START ON 5/18/2018] paliperidone  156 mg Intramuscular Q28 Days     paliperidone  9 mg Oral Daily          Allergies:   No Known Allergies       Labs:     No results found for this or any previous visit (from the past 24 hour(s)).       Psychiatric Examination:     BP (!) 150/101  Pulse 86  Temp 97.5  F (36.4   C) (Oral)  Resp 16  Ht 1.829 m (6')  Wt 111.4 kg (245 lb 8 oz)  BMI 33.3 kg/m2  Weight is 245 lbs 8 oz  Body mass index is 33.3 kg/(m^2).                                  Sitting Orthostatic BP: 141/106         Standing Orthostatic BP: 142/100      Appearance: awake, alert and dressed in hospital scrubs  Attitude: more  cooperative  Eye Contact:  fair  Mood:  anxious  Affect:  constricted mobility  Speech:  clear, coherent  Psychomotor Behavior:  no evidence of tardive dyskinesia, dystonia, or tics  Throught Process:  logical and linear  Associations:  no loose associations  Thought Content:  no evidence of suicidal ideation or homicidal ideation and no evidence of openly psychotic thoughts? it is possible, though, that patient has some paranoia.   Insight: partial  Judgement:  fair  Oriented to:  time, person, and place  Attention Span and Concentration:  fair  Recent and Remote Memory:  fair    Clinical Global Impressions  First:  Considering your total clinical experience with this particular patient population, how severe are the patient's symptoms at this time?: 6 (05/03/18 1825)  Compared to the patient's condition at the START of treatment, this patient's condition is:: 4 (05/03/18 1825)  Most recent:  Considering your total clinical experience with this particular patient population, how severe are the patient's symptoms at this time?: 4 (5/17/2018 )  Compared to the patient's condition at the START of treatment, this patient's condition is: 3 (5/17/2018 )    # Pain Assessment:  Current Pain Score 5/17/2018   Patient currently in pain? denies   Pain score (0-10) -   Pain location -   Pain descriptors -   Nabor s pain level was assessed, he denies pain today.             Precautions:     Behavioral Orders   Procedures     Code 1 - Restrict to Unit     Routine Programming     As clinically indicated     Status 15     Every 15 minutes.     Suicide precautions     Patients on Suicide Precautions should have  a Combination Diet ordered that includes a Diet selection(s) AND a Behavioral Tray selection for Safe Tray - with utensils, or Safe Tray - NO utensils            DIagnoses:     Bipolar affective disorder, mixed, history of diagnosis of schizoaffective disorder and schizophrenia as well, history of cannabis use disorder, rule out stimulant use disorder.          Plan:   Patient has more cooperative recently. May need to be transferred to [facility] as he is committed to [facility]. Plan is for MICD treatment. PD is being revoked per CTC. His papers are being reviewed by [facility].

## 2018-05-17 NOTE — PROGRESS NOTES
" Patient continues to spend much of shift in room resting in bed.  Patient states he is \"just trying to pass time until treatment.\"  Patient did attend community meeting today.  Patient denies any SI/SIB.        05/17/18 1427   Behavioral Health   Hallucinations denies / not responding to hallucinations   Thinking distractable   Orientation person: oriented;place: oriented;date: oriented;time: oriented   Memory baseline memory   Insight poor   Judgement impaired   Eye Contact at examiner   Affect full range affect   Mood mood is calm   Physical Appearance/Attire attire appropriate to age and situation   Hygiene well groomed   Suicidality other (see comments)   1. Wish to be Dead No   2. Non-Specific Active Suicidal Thoughts  No   Self Injury other (see comment)  (denies)   Activity isolative;withdrawn   Speech clear;coherent   Medication Sensitivity no stated side effects   Psychomotor / Gait balanced;steady   Psycho Education   Type of Intervention 1:1 intervention   Response participates, initiates socially appropriate   Hours 0.5   Treatment Detail check-in     "

## 2018-05-17 NOTE — PLAN OF CARE
Problem: Patient Care Overview  Goal: Team Discussion  Team Plan:   BEHAVIORAL TEAM DISCUSSION    Participants: Dr. Chatterjee, [facility] Marcie Couch, RN Mabel Culp  Progress: pt frustrated about the lengthy hospitalization and is ready/stabel to move on  Continued Stay Criteria/Rationale: PD revoked  Medical/Physical: none  Precautions:   Behavioral Orders   Procedures     Code 1 - Restrict to Unit     Routine Programming     As clinically indicated     Status 15     Every 15 minutes.     Suicide precautions     Patients on Suicide Precautions should have a Combination Diet ordered that includes a Diet selection(s) AND a Behavioral Tray selection for Safe Tray - with utensils, or Safe Tray - NO utensils       Plan: transfer to MICD facility, waiting for accepting facility    Rationale for change in precautions or plan: no change

## 2018-05-18 PROCEDURE — 25000132 ZZH RX MED GY IP 250 OP 250 PS 637: Performed by: PSYCHIATRY & NEUROLOGY

## 2018-05-18 PROCEDURE — 12400007 ZZH R&B MH INTERMEDIATE UMMC

## 2018-05-18 PROCEDURE — 25000128 H RX IP 250 OP 636: Performed by: PSYCHIATRY & NEUROLOGY

## 2018-05-18 PROCEDURE — 99231 SBSQ HOSP IP/OBS SF/LOW 25: CPT | Performed by: PSYCHIATRY & NEUROLOGY

## 2018-05-18 RX ADMIN — VITAMIN D, TAB 1000IU (100/BT) 2000 UNITS: 25 TAB at 09:14

## 2018-05-18 RX ADMIN — MIRTAZAPINE 15 MG: 15 TABLET, FILM COATED ORAL at 20:30

## 2018-05-18 RX ADMIN — OLANZAPINE 10 MG: 10 TABLET, FILM COATED ORAL at 23:22

## 2018-05-18 RX ADMIN — PALIPERIDONE PALMITATE 156 MG: 156 INJECTION INTRAMUSCULAR at 19:15

## 2018-05-18 RX ADMIN — OLANZAPINE 5 MG: 5 TABLET, FILM COATED ORAL at 09:14

## 2018-05-18 RX ADMIN — PALIPERIDONE 9 MG: 9 TABLET, EXTENDED RELEASE ORAL at 09:14

## 2018-05-18 RX ADMIN — DIVALPROEX SODIUM 2000 MG: 500 TABLET, EXTENDED RELEASE ORAL at 20:30

## 2018-05-18 RX ADMIN — NICOTINE 1 PATCH: 21 PATCH, EXTENDED RELEASE TRANSDERMAL at 20:30

## 2018-05-18 RX ADMIN — LISINOPRIL 20 MG: 20 TABLET ORAL at 09:14

## 2018-05-18 RX ADMIN — OLANZAPINE 5 MG: 5 TABLET, FILM COATED ORAL at 20:30

## 2018-05-18 RX ADMIN — CYANOCOBALAMIN TAB 1000 MCG 1000 MCG: 1000 TAB at 09:14

## 2018-05-18 ASSESSMENT — ACTIVITIES OF DAILY LIVING (ADL)
LAUNDRY: WITH SUPERVISION
GROOMING: INDEPENDENT
ORAL_HYGIENE: INDEPENDENT
DRESS: SCRUBS (BEHAVIORAL HEALTH);INDEPENDENT
DRESS: INDEPENDENT
GROOMING: INDEPENDENT
ORAL_HYGIENE: INDEPENDENT

## 2018-05-18 NOTE — PROGRESS NOTES
05/17/18 2300   Behavioral Health   Hallucinations denies / not responding to hallucinations   Thinking poor concentration;distractable   Orientation person: oriented;place: oriented;date: oriented;time: oriented   Memory baseline memory   Insight poor   Judgement impaired   Eye Contact at examiner   Affect full range affect   Mood mood is calm   Physical Appearance/Attire attire appropriate to age and situation   Hygiene well groomed   Suicidality other (see comments)  (denies)   Wish to be Dead Description No   Non-Specific Active Suicidal Thought Description No   Self Injury other (see comment)  (Denies)   Elopement (none observed)   Activity other (see comment)  (visible in the milieu)   Speech clear;coherent   Medication Sensitivity no stated side effects;no observed side effects   Psychomotor / Gait balanced   Activities of Daily Living   Hygiene/Grooming independent   Oral Hygiene independent   Dress independent   Room Organization independent   Activity   Activity Assistance Provided independent     Pt was visible in the milieu, played card games with peers.  Calm and cooperative, full range affect.

## 2018-05-19 PROCEDURE — 25000132 ZZH RX MED GY IP 250 OP 250 PS 637: Performed by: PSYCHIATRY & NEUROLOGY

## 2018-05-19 PROCEDURE — 12400007 ZZH R&B MH INTERMEDIATE UMMC

## 2018-05-19 RX ADMIN — MELATONIN TAB 3 MG 3 MG: 3 TAB at 19:15

## 2018-05-19 RX ADMIN — VITAMIN D, TAB 1000IU (100/BT) 2000 UNITS: 25 TAB at 07:59

## 2018-05-19 RX ADMIN — NICOTINE 1 PATCH: 21 PATCH, EXTENDED RELEASE TRANSDERMAL at 19:13

## 2018-05-19 RX ADMIN — DIVALPROEX SODIUM 2000 MG: 500 TABLET, EXTENDED RELEASE ORAL at 19:16

## 2018-05-19 RX ADMIN — CYANOCOBALAMIN TAB 1000 MCG 1000 MCG: 1000 TAB at 07:59

## 2018-05-19 RX ADMIN — IBUPROFEN 600 MG: 600 TABLET ORAL at 02:40

## 2018-05-19 RX ADMIN — MIRTAZAPINE 15 MG: 15 TABLET, FILM COATED ORAL at 19:16

## 2018-05-19 RX ADMIN — OLANZAPINE 5 MG: 5 TABLET, FILM COATED ORAL at 07:59

## 2018-05-19 RX ADMIN — OLANZAPINE 5 MG: 5 TABLET, FILM COATED ORAL at 19:13

## 2018-05-19 RX ADMIN — PALIPERIDONE 9 MG: 9 TABLET, EXTENDED RELEASE ORAL at 07:59

## 2018-05-19 RX ADMIN — LISINOPRIL 20 MG: 20 TABLET ORAL at 07:59

## 2018-05-19 ASSESSMENT — ACTIVITIES OF DAILY LIVING (ADL)
ORAL_HYGIENE: INDEPENDENT
DRESS: SCRUBS (BEHAVIORAL HEALTH);INDEPENDENT
LAUNDRY: WITH SUPERVISION
LAUNDRY: WITH SUPERVISION
DRESS: INDEPENDENT
ORAL_HYGIENE: INDEPENDENT
GROOMING: INDEPENDENT
GROOMING: INDEPENDENT

## 2018-05-19 NOTE — PROGRESS NOTES
Pt received 156 mg  Invega Sustenna IM in his left deltoid muscle. Pt tolerated well. Will continue to assess and offer support.

## 2018-05-19 NOTE — PROGRESS NOTES
"Pt visible in milieu and social with peers and staff. Pt had a verbal altercation with another patient (Sayra). This patient was asking Nabor about why his eye looked different and he got upset. Nabor then walked away stating \" screw that bitch\" and then slammed his door. Afterwards pt was fine and able to be calm in the milieu. Pt has labile mood at times. Pt reported feeling better as he was able to get his shot of medication he has been waiting for. Pt reports no SI and SIB and is frustrated with the process of finding a placement and how long it is taking.      05/18/18 2206   Behavioral Health   Hallucinations denies / not responding to hallucinations   Thinking poor concentration   Orientation place: oriented;person: oriented;date: oriented;time: oriented   Memory baseline memory   Insight poor   Judgement impaired   Eye Contact at examiner   Affect full range affect   Mood labile   Physical Appearance/Attire attire appropriate to age and situation   Hygiene well groomed   Suicidality (denies)   1. Wish to be Dead No   2. Non-Specific Active Suicidal Thoughts  No   Self Injury (denies)   Elopement Statements about wanting to leave   Activity (visible in milieu)   Speech clear;coherent   Medication Sensitivity no stated side effects   Psychomotor / Gait balanced   Psycho Education   Type of Intervention 1:1 intervention   Response participates, initiates socially appropriate   Hours 0.5   Activities of Daily Living   Hygiene/Grooming independent   Oral Hygiene independent   Dress independent   Laundry with supervision   Room Organization independent     "

## 2018-05-19 NOTE — PROGRESS NOTES
"[facility], [place]   Psychiatric Progress Note        Interim History:     The patient's care was discussed with the treatment team during the daily team meeting and/or staff's chart notes were reviewed.    Patient has a history of schizoaffective disorder vs Bipolar affective disorder and stimulant use disorder. Was admitted by his CM because of disorganized behavior and violating conditions of PD. PD was subsequently revoked. Patient still has problems controlling his temper, this seems to be a personality trait, less likely due to some underlying paranoia? He is compliant with meds, reports feeling bored with being here for so long.     Overall, the patient seems to be passing time on the unit adequately. Some incidentally of verbal altercation with peers. He was wondering about when he would get his Invega Sustenna injection, which was due today, and mentioned \"I like that mediation, it helps me stay balanced.\"  I called the pharmacy and confirmed that he would be getting that dose today. Compliant with other medications, denied side effects.          Medications:       cholecalciferol  2,000 Units Oral Daily     cyanocobalamin  1,000 mcg Oral Daily     divalproex sodium extended-release  2,000 mg Oral At Bedtime     lisinopril  20 mg Oral Daily     mirtazapine  15 mg Oral At Bedtime     nicotine  1 patch Transdermal Daily     nicotine   Transdermal Q8H     nicotine   Transdermal Daily     OLANZapine  5 mg Oral BID     paliperidone  156 mg Intramuscular Q28 Days     paliperidone  9 mg Oral Daily          Allergies:   No Known Allergies       Labs:     No results found for this or any previous visit (from the past 24 hour(s)).       Psychiatric Examination:     /87 (BP Location: Left arm)  Pulse 96  Temp 97.1  F (36.2  C) (Oral)  Resp 14  Ht 1.829 m (6')  Wt 111.4 kg (245 lb 8 oz)  SpO2 97%  BMI 33.3 kg/m2  Weight is 245 lbs 8 oz  Body mass index is 33.3 kg/(m^2).        "                           Sitting Orthostatic BP: 141/106         Standing Orthostatic BP: 142/100      Appearance: awake, alert and dressed in hospital scrubs  Attitude: more  cooperative  Eye Contact:  fair  Mood:  anxious  Affect:  constricted mobility  Speech:  clear, coherent  Psychomotor Behavior:  no evidence of tardive dyskinesia, dystonia, or tics  Throught Process:  logical and linear  Associations:  no loose associations  Thought Content:  no evidence of suicidal ideation or homicidal ideation and no evidence of openly psychotic thoughts? it is possible, though, that patient has some paranoia.   Insight: partial  Judgement:  fair  Oriented to:  time, person, and place  Attention Span and Concentration:  fair  Recent and Remote Memory:  fair    Clinical Global Impressions  First:  Considering your total clinical experience with this particular patient population, how severe are the patient's symptoms at this time?: 6 (05/03/18 1825)  Compared to the patient's condition at the START of treatment, this patient's condition is:: 4 (05/03/18 1825)  Most recent:  Considering your total clinical experience with this particular patient population, how severe are the patient's symptoms at this time?: 4 (5/17/2018 )  Compared to the patient's condition at the START of treatment, this patient's condition is: 3 (5/17/2018 )    # Pain Assessment:  Current Pain Score 5/18/2018   Patient currently in pain? denies   Pain score (0-10) -   Pain location -   Pain descriptors -   Nabor s pain level was assessed, he denies pain today.             Precautions:     Behavioral Orders   Procedures     Code 1 - Restrict to Unit     Routine Programming     As clinically indicated     Status 15     Every 15 minutes.     Suicide precautions     Patients on Suicide Precautions should have a Combination Diet ordered that includes a Diet selection(s) AND a Behavioral Tray selection for Safe Tray - with utensils, or Safe Tray - NO  utensils            DIagnoses:     Bipolar affective disorder, mixed, history of diagnosis of schizoaffective disorder and schizophrenia as well, history of cannabis use disorder, rule out stimulant use disorder.          Plan:   Patient has more cooperative recently. May need to be transferred to [facility] as he is committed to [facility]. Plan is for MICD treatment. PD is being revoked per CTC. His papers are being reviewed by [facility].     Jamaal Golden MD  [facility]
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
## 2018-05-20 PROCEDURE — 25000132 ZZH RX MED GY IP 250 OP 250 PS 637: Performed by: PSYCHIATRY & NEUROLOGY

## 2018-05-20 PROCEDURE — 12400007 ZZH R&B MH INTERMEDIATE UMMC

## 2018-05-20 RX ADMIN — VITAMIN D, TAB 1000IU (100/BT) 2000 UNITS: 25 TAB at 08:46

## 2018-05-20 RX ADMIN — OLANZAPINE 10 MG: 10 TABLET, FILM COATED ORAL at 13:27

## 2018-05-20 RX ADMIN — CYANOCOBALAMIN TAB 1000 MCG 1000 MCG: 1000 TAB at 08:45

## 2018-05-20 RX ADMIN — MIRTAZAPINE 15 MG: 15 TABLET, FILM COATED ORAL at 20:21

## 2018-05-20 RX ADMIN — MELATONIN TAB 3 MG 3 MG: 3 TAB at 20:20

## 2018-05-20 RX ADMIN — IBUPROFEN 600 MG: 600 TABLET ORAL at 13:27

## 2018-05-20 RX ADMIN — NICOTINE 1 PATCH: 21 PATCH, EXTENDED RELEASE TRANSDERMAL at 20:21

## 2018-05-20 RX ADMIN — LISINOPRIL 20 MG: 20 TABLET ORAL at 08:46

## 2018-05-20 RX ADMIN — OLANZAPINE 5 MG: 5 TABLET, FILM COATED ORAL at 20:21

## 2018-05-20 RX ADMIN — PALIPERIDONE 9 MG: 9 TABLET, EXTENDED RELEASE ORAL at 08:46

## 2018-05-20 RX ADMIN — DIVALPROEX SODIUM 2000 MG: 500 TABLET, EXTENDED RELEASE ORAL at 20:21

## 2018-05-20 RX ADMIN — OLANZAPINE 5 MG: 5 TABLET, FILM COATED ORAL at 08:46

## 2018-05-20 ASSESSMENT — ACTIVITIES OF DAILY LIVING (ADL)
GROOMING: INDEPENDENT
ORAL_HYGIENE: INDEPENDENT
GROOMING: INDEPENDENT
ORAL_HYGIENE: INDEPENDENT
DRESS: INDEPENDENT
LAUNDRY: WITH SUPERVISION
DRESS: STREET CLOTHES
LAUNDRY: WITH SUPERVISION

## 2018-05-20 NOTE — PROGRESS NOTES
"At 0200 sitting at edge of bed. Expressing frustration with being here. \"I can't take it any more\", my parents are dead,my sisters hate me, God has forsaken me. After stating frustrations said he didn't want anything except to sleep and appeared to sleep shortly afterward.  "

## 2018-05-20 NOTE — PLAN OF CARE
"Problem: Mood Impairment (Depressive Signs/Symptoms) (Adult)  Goal: Improved Mood Symptoms (Depressive Signs/Symptoms)  Outcome: No Change  48 Hour Assessment    /87 (BP Location: Left arm)  Pulse 96  Temp 97.1  F (36.2  C) (Oral)  Resp 14  Ht 1.829 m (6')  Wt 111.4 kg (245 lb 8 oz)  SpO2 97%  BMI 33.3 kg/m2    Pt visible on and off throughout the evening, but mainly isolated to his room. Pt is irritable at times, and short in his answers with this writer. He is frustrated with his length of stay, as well as the procedures here at the hospital. \"Why do you guys need to check on me so often? That alone will make someone suicidal.\" Pt does deny being suicidal.      SI/SIB: Pt denies. Has not made gestures or comments indicating suicidality.     Auditory/Visual Hallucinations: Pt denies. Does not appear responding.    Pt took all medications without issue. No additional concerns at this time. Will continue to assess and offer support.        "

## 2018-05-20 NOTE — PROGRESS NOTES
05/20/18 1306   Behavioral Health   Hallucinations denies / not responding to hallucinations   Thinking poor concentration;distractable   Orientation time: oriented;date: oriented;place: oriented;person: oriented   Memory baseline memory   Insight insight appropriate to situation   Judgement impaired   Eye Contact at examiner   Affect full range affect   Mood mood is calm   Physical Appearance/Attire appears stated age   Hygiene well groomed   Suicidality other (see comments)  (Denies)   1. Wish to be Dead No   2. Non-Specific Active Suicidal Thoughts  No   Elopement Statements about wanting to leave   Activity isolative;withdrawn;restless   Speech clear;coherent   Medication Sensitivity no stated side effects   Psychomotor / Gait steady;balanced   Psycho Education   Type of Intervention 1:1 intervention   Response participates, initiates socially appropriate   Hours 0.5   Activities of Daily Living   Hygiene/Grooming independent   Oral Hygiene independent   Dress street clothes   Laundry with supervision   Room Organization independent   Activity   Activity Assistance Provided independent   Pt spent most of his time in the room sleeping , out for meals or making requests. He was observed sleeping and sometime talks to self while sleeping. He appears isolative and withdrawn to room but social on approach. He denies suicidal ideation,auditory haluccinations or SIB. No physical or medication effects concerns.

## 2018-05-21 PROBLEM — I15.8 OTHER SECONDARY HYPERTENSION: Status: ACTIVE | Noted: 2018-05-21

## 2018-05-21 PROBLEM — R52 MODERATE PAIN: Status: ACTIVE | Noted: 2018-05-21

## 2018-05-21 PROBLEM — F25.0 SCHIZOAFFECTIVE DISORDER, BIPOLAR TYPE (H): Status: ACTIVE | Noted: 2018-05-21

## 2018-05-21 PROBLEM — Z72.0 TOBACCO USE: Status: ACTIVE | Noted: 2018-05-21

## 2018-05-21 PROCEDURE — 99231 SBSQ HOSP IP/OBS SF/LOW 25: CPT | Performed by: PSYCHIATRY & NEUROLOGY

## 2018-05-21 PROCEDURE — 25000132 ZZH RX MED GY IP 250 OP 250 PS 637: Performed by: PSYCHIATRY & NEUROLOGY

## 2018-05-21 PROCEDURE — 12400007 ZZH R&B MH INTERMEDIATE UMMC

## 2018-05-21 RX ORDER — IBUPROFEN 600 MG/1
600 TABLET, FILM COATED ORAL EVERY 6 HOURS PRN
Qty: 30 TABLET | Refills: 1 | Status: SHIPPED | OUTPATIENT
Start: 2018-05-21 | End: 2020-02-04

## 2018-05-21 RX ORDER — OLANZAPINE 5 MG/1
5 TABLET ORAL AT BEDTIME
Qty: 30 TABLET | Refills: 1 | Status: SHIPPED | OUTPATIENT
Start: 2018-05-21 | End: 2020-02-04

## 2018-05-21 RX ORDER — LISINOPRIL 20 MG/1
20 TABLET ORAL DAILY
Qty: 30 TABLET | Refills: 1 | Status: ON HOLD | OUTPATIENT
Start: 2018-05-21 | End: 2020-02-10

## 2018-05-21 RX ORDER — LANOLIN ALCOHOL/MO/W.PET/CERES
1000 CREAM (GRAM) TOPICAL DAILY
Qty: 30 TABLET | Refills: 1 | Status: SHIPPED | OUTPATIENT
Start: 2018-05-21 | End: 2020-02-04

## 2018-05-21 RX ORDER — NICOTINE 21 MG/24HR
1 PATCH, TRANSDERMAL 24 HOURS TRANSDERMAL DAILY
Qty: 30 PATCH | Refills: 1 | Status: SHIPPED | OUTPATIENT
Start: 2018-05-21 | End: 2020-02-04

## 2018-05-21 RX ORDER — MIRTAZAPINE 15 MG/1
15 TABLET, FILM COATED ORAL AT BEDTIME
Qty: 30 TABLET | Refills: 1 | Status: SHIPPED | OUTPATIENT
Start: 2018-05-21 | End: 2020-02-04

## 2018-05-21 RX ORDER — OLANZAPINE 5 MG/1
5 TABLET ORAL AT BEDTIME
Status: DISCONTINUED | OUTPATIENT
Start: 2018-05-21 | End: 2018-06-15 | Stop reason: HOSPADM

## 2018-05-21 RX ORDER — HYDROXYZINE HYDROCHLORIDE 25 MG/1
25 TABLET, FILM COATED ORAL EVERY 4 HOURS PRN
Qty: 30 TABLET | Refills: 1 | Status: SHIPPED | OUTPATIENT
Start: 2018-05-21 | End: 2020-02-04

## 2018-05-21 RX ORDER — DIVALPROEX SODIUM 500 MG/1
2000 TABLET, EXTENDED RELEASE ORAL AT BEDTIME
Qty: 120 TABLET | Refills: 1 | Status: SHIPPED | OUTPATIENT
Start: 2018-05-21 | End: 2020-02-04

## 2018-05-21 RX ADMIN — VITAMIN D, TAB 1000IU (100/BT) 2000 UNITS: 25 TAB at 08:41

## 2018-05-21 RX ADMIN — IBUPROFEN 600 MG: 600 TABLET ORAL at 08:44

## 2018-05-21 RX ADMIN — NICOTINE 1 PATCH: 21 PATCH, EXTENDED RELEASE TRANSDERMAL at 20:20

## 2018-05-21 RX ADMIN — OLANZAPINE 5 MG: 5 TABLET, FILM COATED ORAL at 20:21

## 2018-05-21 RX ADMIN — DIVALPROEX SODIUM 2000 MG: 500 TABLET, EXTENDED RELEASE ORAL at 20:21

## 2018-05-21 RX ADMIN — LISINOPRIL 20 MG: 20 TABLET ORAL at 08:41

## 2018-05-21 RX ADMIN — OLANZAPINE 5 MG: 5 TABLET, FILM COATED ORAL at 08:41

## 2018-05-21 RX ADMIN — CYANOCOBALAMIN TAB 1000 MCG 1000 MCG: 1000 TAB at 08:41

## 2018-05-21 RX ADMIN — PALIPERIDONE 9 MG: 9 TABLET, EXTENDED RELEASE ORAL at 08:41

## 2018-05-21 RX ADMIN — MIRTAZAPINE 15 MG: 15 TABLET, FILM COATED ORAL at 20:20

## 2018-05-21 ASSESSMENT — ACTIVITIES OF DAILY LIVING (ADL)
DRESS: INDEPENDENT
HYGIENE/GROOMING: INDEPENDENT
GROOMING: INDEPENDENT;PROMPTS
ORAL_HYGIENE: INDEPENDENT
DRESS: STREET CLOTHES
ORAL_HYGIENE: INDEPENDENT

## 2018-05-21 NOTE — PLAN OF CARE
Problem: Energy/Vigor Impairment (Depressive Signs/Symptoms) (Adult)  Goal: Improved Energy/Vigor (Depressive Signs/Symptoms)  Outcome: Improving  Patient feels really happy that he will go [facility].  Plans on not using anything except marijuana.  Does shower but has not today.  Spends time in room but comes out for medication and meals.  Pleasant and cooperative. Some interaction with other but stays in room much of day.No groups today.

## 2018-05-21 NOTE — PROGRESS NOTES
[facility], [place]   Psychiatric Progress Note        Interim History:     The patient's care was discussed with the treatment team during the daily team meeting and/or staff's chart notes were reviewed.    Patient has a history of schizoaffective disorder vs Bipolar affective disorder and stimulant use disorder. Was admitted by his CM because of disorganized behavior and violating conditions of PD. PD was subsequently revoked. Patient still has problems controlling his temper, this seems to be a personality trait, less likely due to some underlying paranoia.     Interim: Overall, the patient seems to be passing time on the unit mostly in his room and out for meals. Happy about being able to go to [facility] for treatment. Denies any current complaints.          Medications:       cholecalciferol  2,000 Units Oral Daily     cyanocobalamin  1,000 mcg Oral Daily     divalproex sodium extended-release  2,000 mg Oral At Bedtime     lisinopril  20 mg Oral Daily     mirtazapine  15 mg Oral At Bedtime     nicotine  1 patch Transdermal Daily     nicotine   Transdermal Q8H     nicotine   Transdermal Daily     OLANZapine  5 mg Oral BID     paliperidone  156 mg Intramuscular Q28 Days     paliperidone  9 mg Oral Daily          Allergies:   No Known Allergies       Labs:     No results found for this or any previous visit (from the past 24 hour(s)).       Psychiatric Examination:     /90 (BP Location: Left arm)  Pulse 91  Temp 97.5  F (36.4  C) (Oral)  Resp 16  Ht 1.829 m (6')  Wt 112 kg (247 lb)  SpO2 97%  BMI 33.5 kg/m2  Weight is 247 lbs 0 oz  Body mass index is 33.5 kg/(m^2).                                  Sitting Orthostatic BP: 141/106         Standing Orthostatic BP: 142/100      Appearance: awake, alert and dressed in hospital scrubs  Attitude: more  cooperative  Eye Contact:  fair  Mood:  anxious  Affect:  constricted mobility  Speech:  clear, coherent  Psychomotor Behavior:   no evidence of tardive dyskinesia, dystonia, or tics  Throught Process:  logical and linear  Associations:  no loose associations  Thought Content:  no evidence of suicidal ideation or homicidal ideation and no evidence of openly psychotic thoughts? it is possible, though, that patient has some paranoia.   Insight: partial  Judgement:  fair  Oriented to:  time, person, and place  Attention Span and Concentration:  fair  Recent and Remote Memory:  fair    Clinical Global Impressions  First:  Considering your total clinical experience with this particular patient population, how severe are the patient's symptoms at this time?: 6 (05/03/18 1825)  Compared to the patient's condition at the START of treatment, this patient's condition is:: 4 (05/03/18 1825)  Most recent:  Considering your total clinical experience with this particular patient population, how severe are the patient's symptoms at this time?: 4 (5/17/2018 )  Compared to the patient's condition at the START of treatment, this patient's condition is: 3 (5/17/2018 )    # Pain Assessment:  Current Pain Score 5/21/2018   Patient currently in pain? yes   Pain score (0-10) -   Pain location -   Pain descriptors -   Nabor s pain level was assessed, he denies pain today.             Precautions:     Behavioral Orders   Procedures     Code 1 - Restrict to Unit     Routine Programming     As clinically indicated     Status 15     Every 15 minutes.     Suicide precautions     Patients on Suicide Precautions should have a Combination Diet ordered that includes a Diet selection(s) AND a Behavioral Tray selection for Safe Tray - with utensils, or Safe Tray - NO utensils            DIagnoses:     Bipolar affective disorder, mixed, history of diagnosis of schizoaffective disorder and schizophrenia as well, history of cannabis use disorder, rule out stimulant use disorder.          Plan:   Patient has more cooperative recently. Patient seems to feel the daytime dose of  Zyprexa is excessive, and makes him tired, preferred just the HS dose; will adjust medications accordingly. Excited about wanting to be discharged to [facility]. Denied any SI/HI/AH/VH.     - I spoke with pharmacist regarding Invega Susstenna doses. Seems that [facility] can accomidate pt's HARRIS, but will confirm with SW tomorrow.     Medications:  - Depakote 2000 mg HS  - Remeron 15 mg HS  - Zyprexa 10 mg HS  - Invega Susstenna  mg (next dose 6/15).     Legal: Committed     Dispo: Discharge to [facility] this week       Jamaal Golden MD  [facility]

## 2018-05-21 NOTE — PROGRESS NOTES
patient spent most of the shift in his room, but did come out to socialize from time to time. No mental health symptoms endorsed, with patient simply making clear how ready he felt to leave and how he felt bored here.       05/20/18 9799   Behavioral Health   Hallucinations denies / not responding to hallucinations   Thinking poor concentration   Orientation person: oriented;place: oriented;date: oriented;time: oriented   Memory baseline memory   Insight insight appropriate to situation;insight appropriate to events   Judgement impaired   Eye Contact at examiner   Affect full range affect   Mood mood is calm   Physical Appearance/Attire appears stated age;attire appropriate to age and situation   Hygiene well groomed   1. Wish to be Dead No   2. Non-Specific Active Suicidal Thoughts  No   Elopement Statements about wanting to leave   Activity withdrawn   Speech clear;coherent   Medication Sensitivity no observed side effects;no stated side effects   Psychomotor / Gait steady;balanced   Psycho Education   Type of Intervention 1:1 intervention   Response participates, initiates socially appropriate   Hours 0.5   Activities of Daily Living   Hygiene/Grooming independent   Oral Hygiene independent   Dress independent   Laundry with supervision   Room Organization independent

## 2018-05-22 PROCEDURE — 25000132 ZZH RX MED GY IP 250 OP 250 PS 637: Performed by: PSYCHIATRY & NEUROLOGY

## 2018-05-22 PROCEDURE — 12400007 ZZH R&B MH INTERMEDIATE UMMC

## 2018-05-22 RX ADMIN — OLANZAPINE 5 MG: 5 TABLET, FILM COATED ORAL at 18:54

## 2018-05-22 RX ADMIN — DIVALPROEX SODIUM 2000 MG: 500 TABLET, EXTENDED RELEASE ORAL at 18:53

## 2018-05-22 RX ADMIN — PALIPERIDONE 9 MG: 9 TABLET, EXTENDED RELEASE ORAL at 08:26

## 2018-05-22 RX ADMIN — VITAMIN D, TAB 1000IU (100/BT) 2000 UNITS: 25 TAB at 08:26

## 2018-05-22 RX ADMIN — CYANOCOBALAMIN TAB 1000 MCG 1000 MCG: 1000 TAB at 08:26

## 2018-05-22 RX ADMIN — MELATONIN TAB 3 MG 3 MG: 3 TAB at 21:27

## 2018-05-22 RX ADMIN — MIRTAZAPINE 15 MG: 15 TABLET, FILM COATED ORAL at 18:54

## 2018-05-22 RX ADMIN — NICOTINE 1 PATCH: 21 PATCH, EXTENDED RELEASE TRANSDERMAL at 18:53

## 2018-05-22 RX ADMIN — LISINOPRIL 20 MG: 20 TABLET ORAL at 08:26

## 2018-05-22 ASSESSMENT — ACTIVITIES OF DAILY LIVING (ADL)
ORAL_HYGIENE: INDEPENDENT
GROOMING: INDEPENDENT
LAUNDRY: WITH SUPERVISION
DRESS: STREET CLOTHES
GROOMING: INDEPENDENT
LAUNDRY: WITH SUPERVISION
ORAL_HYGIENE: INDEPENDENT
DRESS: INDEPENDENT

## 2018-05-22 NOTE — PROGRESS NOTES
Patient reports good mood, denies SI/SIB or psychotic symptoms. Patient was visible and social with full range affect for several hours of the shift but also napped extensively. Patient was bright and positive in his interactions but did appear distractable with poor insight.      05/21/18 2200   Behavioral Health   Hallucinations denies / not responding to hallucinations   Thinking poor concentration   Orientation person: oriented;place: oriented;date: oriented;time: oriented   Memory baseline memory   Insight poor   Judgement impaired   Eye Contact at examiner   Affect full range affect   Mood mood is calm   Physical Appearance/Attire attire appropriate to age and situation   Hygiene well groomed   Suicidality other (see comments)  (Denies)   1. Wish to be Dead No   2. Non-Specific Active Suicidal Thoughts  No   Self Injury other (see comment)  (Denies)   Activity other (see comment)  (Visible and social when awake.)   Speech clear;coherent   Medication Sensitivity no stated side effects;no observed side effects   Psychomotor / Gait balanced;steady   Activities of Daily Living   Hygiene/Grooming independent   Oral Hygiene independent   Dress independent   Room Organization independent

## 2018-05-22 NOTE — PLAN OF CARE
Problem: Mood Impairment (Depressive Signs/Symptoms) (Adult)  Goal: Improved Mood Symptoms (Depressive Signs/Symptoms)  Pt was visible in the milieu  but mostly isolative to his room. He reports improved mood, denies depression, anxiety and SI/SIB. Pt states he is looking forward to discharging to treatment.

## 2018-05-23 PROCEDURE — 25000132 ZZH RX MED GY IP 250 OP 250 PS 637: Performed by: PSYCHIATRY & NEUROLOGY

## 2018-05-23 PROCEDURE — 12400007 ZZH R&B MH INTERMEDIATE UMMC

## 2018-05-23 RX ADMIN — MIRTAZAPINE 15 MG: 15 TABLET, FILM COATED ORAL at 20:26

## 2018-05-23 RX ADMIN — PALIPERIDONE 9 MG: 9 TABLET, EXTENDED RELEASE ORAL at 07:54

## 2018-05-23 RX ADMIN — IBUPROFEN 600 MG: 600 TABLET ORAL at 19:41

## 2018-05-23 RX ADMIN — VITAMIN D, TAB 1000IU (100/BT) 2000 UNITS: 25 TAB at 07:54

## 2018-05-23 RX ADMIN — OLANZAPINE 5 MG: 5 TABLET, FILM COATED ORAL at 20:28

## 2018-05-23 RX ADMIN — NICOTINE 1 PATCH: 21 PATCH, EXTENDED RELEASE TRANSDERMAL at 20:25

## 2018-05-23 RX ADMIN — CYANOCOBALAMIN TAB 1000 MCG 1000 MCG: 1000 TAB at 07:54

## 2018-05-23 RX ADMIN — DIVALPROEX SODIUM 2000 MG: 500 TABLET, EXTENDED RELEASE ORAL at 20:26

## 2018-05-23 RX ADMIN — MELATONIN TAB 3 MG 3 MG: 3 TAB at 20:25

## 2018-05-23 RX ADMIN — LISINOPRIL 20 MG: 20 TABLET ORAL at 07:56

## 2018-05-23 NOTE — PROGRESS NOTES
Pt requested all of his hs medications early. He continues to be pleasant, but irritable when asked how he is feeling. Visible on and off throughout the evening. No additional concerns at this time.  Will continue to assess and offer support.

## 2018-05-23 NOTE — PROGRESS NOTES
[facility], [place]   Psychiatric Progress Note        Interim History:     The patient's care was discussed with the treatment team during the daily team meeting and/or staff's chart notes were reviewed.    Patient has a history of schizoaffective disorder vs Bipolar affective disorder and stimulant use disorder. Was admitted by his CM because of disorganized behavior and violating conditions of PD. PD was subsequently revoked. Patient still has problems controlling his temper, this seems to be a personality trait, less likely due to some underlying paranoia.     Interim: Overall, the patient seems to be passing time on the unit mostly in his room and out for meals. Happy about being able to go to [facility] for treatment. Denies any current complaints.          Medications:       cholecalciferol  2,000 Units Oral Daily     cyanocobalamin  1,000 mcg Oral Daily     divalproex sodium extended-release  2,000 mg Oral At Bedtime     lisinopril  20 mg Oral Daily     mirtazapine  15 mg Oral At Bedtime     nicotine  1 patch Transdermal Daily     nicotine   Transdermal Q8H     nicotine   Transdermal Daily     OLANZapine  5 mg Oral At Bedtime     paliperidone  156 mg Intramuscular Q28 Days     paliperidone  9 mg Oral Daily          Allergies:   No Known Allergies       Labs:     No results found for this or any previous visit (from the past 24 hour(s)).       Psychiatric Examination:     /90 (BP Location: Left arm)  Pulse 91  Temp 97.8  F (36.6  C) (Oral)  Resp 18  Ht 1.829 m (6')  Wt 112.9 kg (249 lb)  SpO2 97%  BMI 33.77 kg/m2  Weight is 249 lbs 0 oz  Body mass index is 33.77 kg/(m^2).                                  Sitting Orthostatic BP: 141/106         Standing Orthostatic BP: 142/100      Appearance: awake, alert and dressed in hospital scrubs  Attitude: more  cooperative  Eye Contact:  fair  Mood:  anxious  Affect:  constricted mobility  Speech:  clear,  coherent  Psychomotor Behavior:  no evidence of tardive dyskinesia, dystonia, or tics  Throught Process:  logical and linear  Associations:  no loose associations  Thought Content:  no evidence of suicidal ideation or homicidal ideation and no evidence of openly psychotic thoughts? it is possible, though, that patient has some paranoia.   Insight: partial  Judgement:  fair  Oriented to:  time, person, and place  Attention Span and Concentration:  fair  Recent and Remote Memory:  fair    Clinical Global Impressions  First:  Considering your total clinical experience with this particular patient population, how severe are the patient's symptoms at this time?: 6 (05/03/18 1825)  Compared to the patient's condition at the START of treatment, this patient's condition is:: 4 (05/03/18 1825)  Most recent:  Considering your total clinical experience with this particular patient population, how severe are the patient's symptoms at this time?: 4 (5/17/2018 )  Compared to the patient's condition at the START of treatment, this patient's condition is: 3 (5/17/2018 )    # Pain Assessment:  Current Pain Score 5/22/2018   Patient currently in pain? no   Pain score (0-10) -   Pain location -   Pain descriptors -   Nabor s pain level was assessed, he denies pain today.             Precautions:     Behavioral Orders   Procedures     Code 1 - Restrict to Unit     Routine Programming     As clinically indicated     Status 15     Every 15 minutes.     Suicide precautions     Patients on Suicide Precautions should have a Combination Diet ordered that includes a Diet selection(s) AND a Behavioral Tray selection for Safe Tray - with utensils, or Safe Tray - NO utensils            DIagnoses:     Bipolar affective disorder, mixed, history of diagnosis of schizoaffective disorder and schizophrenia as well, history of cannabis use disorder, rule out stimulant use disorder.          Plan:   Patient has more cooperative recently. Patient seems to  feel the daytime dose of Zyprexa is excessive, and makes him tired, preferred just the HS dose; will adjust medications accordingly. Excited about wanting to be discharged to [facility]. Denied any SI/HI/AH/VH.     - I spoke with pharmacist regarding Invega Susstenna doses. Seems that [facility] can accomidate pt's [facility]ARR[facility], but will confirm with SW tomorrow.     Medications:  - Depakote 2000 mg HS  - Remeron 15 mg HS  - Zyprexa 10 mg HS  - Invega Susstenna  mg (next dose 6/15).     Legal: Committed     Dispo: Discharge to [facility] this week       Jamaal Golden MD  [facility]iat[facility]

## 2018-05-23 NOTE — PROGRESS NOTES
Patient was in room sleeping much of shift.  Patient is very pleasant on approach.  Patient out periodically and eats and socializes some.  Patient states he is hoping for d/c soon.  Patient denies SI/SIB.     05/23/18 1500   Behavioral Health   Hallucinations denies / not responding to hallucinations   Thinking intact   Orientation person: oriented;place: oriented;date: oriented;time: oriented   Memory baseline memory   Insight poor   Judgement impaired   Eye Contact at examiner   Affect full range affect   Mood mood is calm   Physical Appearance/Attire attire appropriate to age and situation   Hygiene well groomed   Suicidality other (see comments)  (denies)   1. Wish to be Dead No   2. Non-Specific Active Suicidal Thoughts  No   Self Injury other (see comment)  (denies)   Elopement (none noted)   Activity withdrawn   Speech clear;coherent   Medication Sensitivity no stated side effects   Psychomotor / Gait balanced;steady   Psycho Education   Type of Intervention 1:1 intervention   Response participates, initiates socially appropriate   Hours 0.5

## 2018-05-23 NOTE — PROGRESS NOTES
Received a call from pt's CCM, April, requesting an update.  Writer left her a VM at [phone] letting her know that the plan is for pt to go to [place] sometime this week.

## 2018-05-23 NOTE — PROGRESS NOTES
05/22/18 2130   Behavioral Health   Hallucinations denies / not responding to hallucinations   Thinking intact   Orientation time: oriented;date: oriented;place: oriented;person: oriented   Memory baseline memory   Insight poor   Judgement impaired   Eye Contact at examiner   Mood mood is calm   Physical Appearance/Attire attire appropriate to age and situation   Hygiene well groomed   Suicidality other (see comments)  (Denies)   1. Wish to be Dead No   Wish to be Dead Description no   2. Non-Specific Active Suicidal Thoughts  No   Non-Specific Active Suicidal Thought Description no   Self Injury other (see comment)  (Denies)   Elopement Statements about wanting to leave   Activity isolative;withdrawn   Speech coherent;clear   Medication Sensitivity no observed side effects   Psychomotor / Gait balanced;steady   Psycho Education   Type of Intervention 1:1 intervention   Response participates, initiates socially appropriate   Hours 0.5   Activities of Daily Living   Hygiene/Grooming independent   Oral Hygiene independent   Dress street clothes   Laundry with supervision   Room Organization independent   Activity   Activity Assistance Provided independent   Pt observed in the room sleeping and he comes out for meals and making requests. He denies suicidal ideation and auditory hallucination. He appears cooperative and pleasant on approach.

## 2018-05-24 PROCEDURE — 99231 SBSQ HOSP IP/OBS SF/LOW 25: CPT | Performed by: PSYCHIATRY & NEUROLOGY

## 2018-05-24 PROCEDURE — 25000132 ZZH RX MED GY IP 250 OP 250 PS 637: Performed by: PSYCHIATRY & NEUROLOGY

## 2018-05-24 PROCEDURE — 12400007 ZZH R&B MH INTERMEDIATE UMMC

## 2018-05-24 RX ORDER — PALIPERIDONE 3 MG/1
3 TABLET, EXTENDED RELEASE ORAL DAILY
Status: DISCONTINUED | OUTPATIENT
Start: 2018-05-25 | End: 2018-05-29

## 2018-05-24 RX ADMIN — CYANOCOBALAMIN TAB 1000 MCG 1000 MCG: 1000 TAB at 09:06

## 2018-05-24 RX ADMIN — PALIPERIDONE 9 MG: 9 TABLET, EXTENDED RELEASE ORAL at 09:06

## 2018-05-24 RX ADMIN — DIVALPROEX SODIUM 2000 MG: 500 TABLET, EXTENDED RELEASE ORAL at 19:55

## 2018-05-24 RX ADMIN — MELATONIN TAB 3 MG 3 MG: 3 TAB at 19:55

## 2018-05-24 RX ADMIN — MIRTAZAPINE 15 MG: 15 TABLET, FILM COATED ORAL at 19:54

## 2018-05-24 RX ADMIN — OLANZAPINE 5 MG: 5 TABLET, FILM COATED ORAL at 19:55

## 2018-05-24 RX ADMIN — VITAMIN D, TAB 1000IU (100/BT) 2000 UNITS: 25 TAB at 09:06

## 2018-05-24 RX ADMIN — LISINOPRIL 20 MG: 20 TABLET ORAL at 09:06

## 2018-05-24 RX ADMIN — NICOTINE 1 PATCH: 21 PATCH, EXTENDED RELEASE TRANSDERMAL at 19:53

## 2018-05-24 ASSESSMENT — ACTIVITIES OF DAILY LIVING (ADL)
DRESS: SCRUBS (BEHAVIORAL HEALTH)
HYGIENE/GROOMING: INDEPENDENT
ORAL_HYGIENE: INDEPENDENT

## 2018-05-24 NOTE — PROGRESS NOTES
05/24/18 1400   Behavioral Health   Hallucinations denies / not responding to hallucinations   Thinking intact   Insight admits / accepts;poor   Judgement impaired   Affect full range affect   Mood mood is calm   1. Wish to be Dead No   2. Non-Specific Active Suicidal Thoughts  No   Speech clear;coherent   Psychomotor / Gait balanced;steady   Psycho Education   Type of Intervention 1:1 intervention   Response participates with encouragement   Hours 0.5   Treatment Detail Check in   Activities of Daily Living   Hygiene/Grooming independent   Oral Hygiene independent   Dress scrubs (behavioral health)   Room Organization independent   Pt was calm and cooperated throughout the shift. He spent time socializing in the common areas and also napped. Pt's affect was bright and calm and he reported having no symptoms today.

## 2018-05-24 NOTE — PROGRESS NOTES
Pt does not want to go to [facility] for treatment. He wants to stay in the [place]. Writer left a message with Patricia ([phone]) with the above.

## 2018-05-24 NOTE — PROGRESS NOTES
[facility], [place]   Psychiatric Progress Note        Interim History:     The patient's care was discussed with the treatment team during the daily team meeting and/or staff's chart notes were reviewed.    Patient has a history of schizoaffective disorder vs Bipolar affective disorder and stimulant use disorder. Was admitted by his CM because of disorganized behavior and violating conditions of PD. PD was subsequently revoked. Patient still has problems controlling his temper, this seems to be a personality trait, less likely due to some underlying paranoia.     Interim: Overall, the patient seems to be passing time on the unit mostly in his room and out for meals. Happy about being able to go to [facility] for treatment. Denies any current complaints.          Medications:       cholecalciferol  2,000 Units Oral Daily     cyanocobalamin  1,000 mcg Oral Daily     divalproex sodium extended-release  2,000 mg Oral At Bedtime     lisinopril  20 mg Oral Daily     mirtazapine  15 mg Oral At Bedtime     nicotine  1 patch Transdermal Daily     nicotine   Transdermal Q8H     nicotine   Transdermal Daily     OLANZapine  5 mg Oral At Bedtime     paliperidone  156 mg Intramuscular Q28 Days     [START ON 5/25/2018] paliperidone  3 mg Oral Daily          Allergies:   No Known Allergies       Labs:     No results found for this or any previous visit (from the past 24 hour(s)).       Psychiatric Examination:     BP (!) 165/105  Pulse 74  Temp 97.6  F (36.4  C) (Oral)  Resp 16  Ht 1.829 m (6')  Wt 115.2 kg (254 lb)  SpO2 97%  BMI 34.45 kg/m2  Weight is 254 lbs 0 oz  Body mass index is 34.45 kg/(m^2).                                  Sitting Orthostatic BP: 141/106         Standing Orthostatic BP: 142/100      Appearance: awake, alert and dressed in hospital scrubs  Attitude: more  cooperative  Eye Contact:  fair  Mood:  anxious  Affect:  constricted mobility  Speech:  clear,  coherent  Psychomotor Behavior:  no evidence of tardive dyskinesia, dystonia, or tics  Throught Process:  logical and linear  Associations:  no loose associations  Thought Content:  no evidence of suicidal ideation or homicidal ideation and no evidence of openly psychotic thoughts? it is possible, though, that patient has some paranoia.   Insight: partial  Judgement:  fair  Oriented to:  time, person, and place  Attention Span and Concentration:  fair  Recent and Remote Memory:  fair    Clinical Global Impressions  First:  Considering your total clinical experience with this particular patient population, how severe are the patient's symptoms at this time?: 6 (05/03/18 1825)  Compared to the patient's condition at the START of treatment, this patient's condition is:: 4 (05/03/18 1825)  Most recent:  Considering your total clinical experience with this particular patient population, how severe are the patient's symptoms at this time?: 4 (5/17/2018 )  Compared to the patient's condition at the START of treatment, this patient's condition is: 3 (5/17/2018 )    # Pain Assessment:  Current Pain Score 5/23/2018   Patient currently in pain? denies   Pain score (0-10) 7   Pain location -   Pain descriptors -   Nabor s pain level was assessed, he denies pain today.             Precautions:     Behavioral Orders   Procedures     Code 1 - Restrict to Unit     Routine Programming     As clinically indicated     Status 15     Every 15 minutes.     Suicide precautions     Patients on Suicide Precautions should have a Combination Diet ordered that includes a Diet selection(s) AND a Behavioral Tray selection for Safe Tray - with utensils, or Safe Tray - NO utensils            DIagnoses:     Bipolar affective disorder, mixed, history of diagnosis of schizoaffective disorder and schizophrenia as well, history of cannabis use disorder, rule out stimulant use disorder.          Plan:   Patient overall has been the same as before. He  seems rather board with being hospitalized, and having to wait, but is happy with being accepted to a facility and future oriented. He seems to be sleeping in his room for most of the day, although comes out of his room for morning check in and for meals, thus it seems he picks and chooses which activity to participate in. Nevertheless, his multiple daytime neuroleptics could be a contributing factor to his daytime somnolence. He seems to be taking Invega Susstenna injections QMonth (for a while now), thus I will taper down the PO Invega dose .      Medications:  - Depakote 2000 mg HS  - Remeron 15 mg HS  - Zyprexa 10 mg HS  - Invega Susstenna  mg (next dose 6/15).   - Invega PO dose reduced to 3 mg Daily .     Legal: Committed     Dispo: Discharge to [facility] this week       Jamaal Golden MD  [facility]iat[facility]

## 2018-05-25 PROCEDURE — 99231 SBSQ HOSP IP/OBS SF/LOW 25: CPT | Performed by: PSYCHIATRY & NEUROLOGY

## 2018-05-25 PROCEDURE — 12400007 ZZH R&B MH INTERMEDIATE UMMC

## 2018-05-25 PROCEDURE — 25000132 ZZH RX MED GY IP 250 OP 250 PS 637: Performed by: PSYCHIATRY & NEUROLOGY

## 2018-05-25 RX ADMIN — MELATONIN TAB 3 MG 3 MG: 3 TAB at 21:54

## 2018-05-25 RX ADMIN — DIVALPROEX SODIUM 2000 MG: 500 TABLET, EXTENDED RELEASE ORAL at 21:54

## 2018-05-25 RX ADMIN — PALIPERIDONE 3 MG: 3 TABLET, EXTENDED RELEASE ORAL at 08:55

## 2018-05-25 RX ADMIN — CYANOCOBALAMIN TAB 1000 MCG 1000 MCG: 1000 TAB at 08:55

## 2018-05-25 RX ADMIN — MIRTAZAPINE 15 MG: 15 TABLET, FILM COATED ORAL at 21:54

## 2018-05-25 RX ADMIN — NICOTINE 1 PATCH: 21 PATCH, EXTENDED RELEASE TRANSDERMAL at 21:54

## 2018-05-25 RX ADMIN — LISINOPRIL 20 MG: 20 TABLET ORAL at 08:55

## 2018-05-25 RX ADMIN — OLANZAPINE 5 MG: 5 TABLET, FILM COATED ORAL at 21:53

## 2018-05-25 RX ADMIN — VITAMIN D, TAB 1000IU (100/BT) 2000 UNITS: 25 TAB at 08:55

## 2018-05-25 ASSESSMENT — ACTIVITIES OF DAILY LIVING (ADL)
ORAL_HYGIENE: INDEPENDENT
GROOMING: INDEPENDENT
LAUNDRY: WITH SUPERVISION
DRESS: SCRUBS (BEHAVIORAL HEALTH)

## 2018-05-25 NOTE — PLAN OF CARE
"Problem: Mood Impairment (Depressive Signs/Symptoms) (Adult)  Goal: Improved Mood Symptoms (Depressive Signs/Symptoms)  Outcome: Improving  Pt is has been taking a nap this evening until dinner time. After dinner pt was out in the milieu watching tv. Pt denies SI/SIB. Denies AH/VH. Pt presents with full range affect. Denies feeling depressed or anxious. Pt reports feeling \"better\".       "

## 2018-05-25 NOTE — PROGRESS NOTES
Writer received call from Abilio with [facility]. He stated that pt's insurance is out of network and pt would be responsible for over $6600. Writer then called the insurance company and spent a lengthy time trying to get information on placements they would fund.     Referrals were faxed to: [facility], [facility] and [facility]. Pt signed JIGAR's for all.

## 2018-05-25 NOTE — PROGRESS NOTES
05/25/18 1403   Behavioral Health   Hallucinations denies / not responding to hallucinations   Thinking intact   Orientation time: oriented;date: oriented;place: oriented;person: oriented   Insight insight appropriate to situation   Judgement impaired   Eye Contact at examiner   Affect full range affect   Physical Appearance/Attire appears stated age   Hygiene well groomed   Suicidality other (see comments)  (Denies)   1. Wish to be Dead No   Wish to be Dead Description No   2. Non-Specific Active Suicidal Thoughts  No   Non-Specific Active Suicidal Thought Description No   Self Injury other (see comment)  (Denies)   Elopement Statements about wanting to leave   Activity isolative;withdrawn  (But social when out at lounge)   Speech coherent;clear   Medication Sensitivity no stated side effects   Psychomotor / Gait balanced;steady   Psycho Education   Type of Intervention 1:1 intervention   Response participates, initiates socially appropriate   Hours 0.5   Activities of Daily Living   Hygiene/Grooming independent   Oral Hygiene independent   Dress scrubs (behavioral health)   Laundry with supervision   Room Organization independent   Activity   Activity Assistance Provided independent   Pt spent the entire time in room sleeping and out for meals or making requests. He did report of not be accepted to [facility]. He showered, laundry and made the bed. He appears calm and pleasant on approach. He denies suicidal ideation or hearing voices.  He states that he wants to go out and smoke.

## 2018-05-26 PROCEDURE — 25000132 ZZH RX MED GY IP 250 OP 250 PS 637: Performed by: PSYCHIATRY & NEUROLOGY

## 2018-05-26 PROCEDURE — 12400007 ZZH R&B MH INTERMEDIATE UMMC

## 2018-05-26 RX ADMIN — NICOTINE 1 PATCH: 21 PATCH, EXTENDED RELEASE TRANSDERMAL at 20:29

## 2018-05-26 RX ADMIN — VITAMIN D, TAB 1000IU (100/BT) 2000 UNITS: 25 TAB at 09:18

## 2018-05-26 RX ADMIN — OLANZAPINE 5 MG: 5 TABLET, FILM COATED ORAL at 20:30

## 2018-05-26 RX ADMIN — MELATONIN TAB 3 MG 3 MG: 3 TAB at 20:30

## 2018-05-26 RX ADMIN — IBUPROFEN 600 MG: 600 TABLET ORAL at 18:04

## 2018-05-26 RX ADMIN — LISINOPRIL 20 MG: 20 TABLET ORAL at 09:18

## 2018-05-26 RX ADMIN — PALIPERIDONE 3 MG: 3 TABLET, EXTENDED RELEASE ORAL at 09:18

## 2018-05-26 RX ADMIN — CYANOCOBALAMIN TAB 1000 MCG 1000 MCG: 1000 TAB at 09:18

## 2018-05-26 RX ADMIN — MIRTAZAPINE 15 MG: 15 TABLET, FILM COATED ORAL at 20:30

## 2018-05-26 RX ADMIN — DIVALPROEX SODIUM 2000 MG: 500 TABLET, EXTENDED RELEASE ORAL at 20:30

## 2018-05-26 ASSESSMENT — ACTIVITIES OF DAILY LIVING (ADL)
LAUNDRY: WITH SUPERVISION
DRESS: INDEPENDENT
DRESS: INDEPENDENT
GROOMING: INDEPENDENT
GROOMING: INDEPENDENT
ORAL_HYGIENE: INDEPENDENT
ORAL_HYGIENE: INDEPENDENT

## 2018-05-26 NOTE — PLAN OF CARE
Problem: Mood Impairment (Depressive Signs/Symptoms) (Adult)  Goal: Improved Mood Symptoms (Depressive Signs/Symptoms)  Outcome: Improving  Pt isolates in his room except he comes out for meals. When pt is out of his room he is calm and polite. Pt is med compliant but doesn't attend groups. Pt denies all MH sx at this time. Will continue to monitor.

## 2018-05-26 NOTE — PROGRESS NOTES
[facility], [place]   Psychiatric Progress Note        Interim History:     The patient's care was discussed with the treatment team during the daily team meeting and/or staff's chart notes were reviewed.      Reason for Admission: Patient has a history of schizoaffective disorder vs Bipolar affective disorder and stimulant use disorder. Was admitted by his CM because of disorganized behavior and violating conditions of PD. PD was subsequently revoked.     Interim: Overall, the patient seems to be passing time on the unit mostly in his room and out for meals. He was mildly upset about not being able to go to [facility] and his insurance company denying him coverage. Felt ok with tapering down with his PO Invega dose. Denies any current complaints.          Medications:       cholecalciferol  2,000 Units Oral Daily     cyanocobalamin  1,000 mcg Oral Daily     divalproex sodium extended-release  2,000 mg Oral At Bedtime     lisinopril  20 mg Oral Daily     mirtazapine  15 mg Oral At Bedtime     nicotine  1 patch Transdermal Daily     nicotine   Transdermal Q8H     nicotine   Transdermal Daily     OLANZapine  5 mg Oral At Bedtime     paliperidone  156 mg Intramuscular Q28 Days     paliperidone  3 mg Oral Daily          Allergies:   No Known Allergies       Labs:     No results found for this or any previous visit (from the past 24 hour(s)).       Psychiatric Examination:     BP (!) 165/105  Pulse 74  Temp 97.5  F (36.4  C) (Oral)  Resp 16  Ht 1.829 m (6')  Wt 115.2 kg (254 lb)  SpO2 97%  BMI 34.45 kg/m2  Weight is 254 lbs 0 oz  Body mass index is 34.45 kg/(m^2).                                  Sitting Orthostatic BP: 141/106         Standing Orthostatic BP: 142/100      Appearance: awake, alert and dressed in hospital scrubs  Attitude: more  cooperative  Eye Contact:  fair  Mood:  anxious  Affect:  constricted mobility  Speech:  clear, coherent  Psychomotor Behavior:  no evidence  of tardive dyskinesia, dystonia, or tics  Throught Process:  logical and linear  Associations:  no loose associations  Thought Content:  no evidence of suicidal ideation or homicidal ideation and no evidence of openly psychotic thoughts? it is possible, though, that patient has some paranoia.   Insight: partial  Judgement:  fair  Oriented to:  time, person, and place  Attention Span and Concentration:  fair  Recent and Remote Memory:  fair    Clinical Global Impressions  First:  Considering your total clinical experience with this particular patient population, how severe are the patient's symptoms at this time?: 6 (05/03/18 1825)  Compared to the patient's condition at the START of treatment, this patient's condition is:: 4 (05/03/18 1825)  Most recent:  Considering your total clinical experience with this particular patient population, how severe are the patient's symptoms at this time?: 4 (5/17/2018 )  Compared to the patient's condition at the START of treatment, this patient's condition is: 3 (5/17/2018 )    # Pain Assessment:  Current Pain Score 5/25/2018   Patient currently in pain? denies   Pain score (0-10) -   Pain location -   Pain descriptors -   Nabor s pain level was assessed, he denies pain today.             Precautions:     Behavioral Orders   Procedures     Code 1 - Restrict to Unit     Routine Programming     As clinically indicated     Status 15     Every 15 minutes.     Suicide precautions     Patients on Suicide Precautions should have a Combination Diet ordered that includes a Diet selection(s) AND a Behavioral Tray selection for Safe Tray - with utensils, or Safe Tray - NO utensils            DIagnoses:     Bipolar affective disorder, mixed, history of diagnosis of schizoaffective disorder and schizophrenia as well, history of cannabis use disorder, rule out stimulant use disorder.          Plan:   Patient overall has been the same as before. He seems rather board with being hospitalized, and  moderately upset about not being able to go to [facility] right away. He seems to be sleeping in his room for most of the day, although comes out of his room for morning check in and for meals, thus it seems he picks and chooses which activity to participate in. Nevertheless, his multiple daytime neuroleptics could be a contributing factor to his daytime somnolence. He seems to be taking Invega Susstenna injections QMonth (for a while now), thus I will taper down the PO Invega dose .      Medications:  - Depakote 2000 mg HS  - Remeron 15 mg HS  - Zyprexa 10 mg HS  - Invega Susstenna  mg (next dose 6/15).   - Invega PO dose reduced to 3 mg Daily .     Legal: Committed     Dispo: Awaiting placement. Awaiting        Jamaal Golden MD  [facility]

## 2018-05-27 PROCEDURE — 25000132 ZZH RX MED GY IP 250 OP 250 PS 637: Performed by: PSYCHIATRY & NEUROLOGY

## 2018-05-27 PROCEDURE — 12400007 ZZH R&B MH INTERMEDIATE UMMC

## 2018-05-27 RX ADMIN — ACETAMINOPHEN 650 MG: 325 TABLET ORAL at 18:50

## 2018-05-27 RX ADMIN — MIRTAZAPINE 15 MG: 15 TABLET, FILM COATED ORAL at 19:10

## 2018-05-27 RX ADMIN — OLANZAPINE 5 MG: 5 TABLET, FILM COATED ORAL at 19:10

## 2018-05-27 RX ADMIN — VITAMIN D, TAB 1000IU (100/BT) 2000 UNITS: 25 TAB at 12:49

## 2018-05-27 RX ADMIN — DIVALPROEX SODIUM 2000 MG: 500 TABLET, EXTENDED RELEASE ORAL at 19:09

## 2018-05-27 RX ADMIN — LISINOPRIL 20 MG: 20 TABLET ORAL at 12:49

## 2018-05-27 RX ADMIN — MELATONIN TAB 3 MG 3 MG: 3 TAB at 19:10

## 2018-05-27 RX ADMIN — PALIPERIDONE 3 MG: 3 TABLET, EXTENDED RELEASE ORAL at 12:49

## 2018-05-27 RX ADMIN — CYANOCOBALAMIN TAB 1000 MCG 1000 MCG: 1000 TAB at 12:49

## 2018-05-27 RX ADMIN — NICOTINE 1 PATCH: 21 PATCH, EXTENDED RELEASE TRANSDERMAL at 19:09

## 2018-05-27 ASSESSMENT — ACTIVITIES OF DAILY LIVING (ADL)
ORAL_HYGIENE: INDEPENDENT
GROOMING: INDEPENDENT
DRESS: STREET CLOTHES
LAUNDRY: WITH SUPERVISION

## 2018-05-27 NOTE — PLAN OF CARE
"Problem: Psychomotor Impairment (Depressive Signs/Symptoms) (Adult)  Goal: Improved Psychomotor Symptoms (Depressive Signs/Symptoms)  Outcome: No Change  Pt continues to be isolative and withdrawn in his room. Pt says he is \"just trying to pass time because they is nothing else to do\". Pt was out for dinner and sat in the lounge for a short while watching a game with peers. Pt presents with full range affect. Pt is calm. Denies SI/SIB. Denies VH/AH. Pt denies feeling depressed or anxious. States \"I am doing well\".      "

## 2018-05-27 NOTE — PROGRESS NOTES
05/27/18 1332   Behavioral Health   Hallucinations denies / not responding to hallucinations   Thinking intact   Orientation time: oriented;date: oriented;place: oriented;person: oriented   Memory baseline memory   Insight admits / accepts   Judgement impaired   Eye Contact at examiner   Affect full range affect   Mood mood is calm   Physical Appearance/Attire appears stated age   Hygiene well groomed   Suicidality other (see comments)  (Denies)   1. Wish to be Dead No   2. Non-Specific Active Suicidal Thoughts  No   Non-Specific Active Suicidal Thought Description No   Self Injury other (see comment)  (Denies)   Activity isolative;withdrawn   Speech clear;coherent   Medication Sensitivity no stated side effects   Psychomotor / Gait steady;balanced   Psycho Education   Type of Intervention 1:1 intervention   Response participates, initiates socially appropriate   Hours 0.5   Activities of Daily Living   Hygiene/Grooming independent   Oral Hygiene independent   Dress street clothes   Laundry with supervision   Room Organization independent   Activity   Activity Assistance Provided independent   Pt spent the entire  time in room and comes for meals . He denies suicidal ideations or hearing voices but talks to self while sleeping. He has minimal interactions with peers.

## 2018-05-28 PROCEDURE — 25000132 ZZH RX MED GY IP 250 OP 250 PS 637: Performed by: PSYCHIATRY & NEUROLOGY

## 2018-05-28 PROCEDURE — 12400007 ZZH R&B MH INTERMEDIATE UMMC

## 2018-05-28 RX ADMIN — MIRTAZAPINE 15 MG: 15 TABLET, FILM COATED ORAL at 20:34

## 2018-05-28 RX ADMIN — CYANOCOBALAMIN TAB 1000 MCG 1000 MCG: 1000 TAB at 08:15

## 2018-05-28 RX ADMIN — DIVALPROEX SODIUM 2000 MG: 500 TABLET, EXTENDED RELEASE ORAL at 20:34

## 2018-05-28 RX ADMIN — NICOTINE 1 PATCH: 21 PATCH, EXTENDED RELEASE TRANSDERMAL at 20:33

## 2018-05-28 RX ADMIN — VITAMIN D, TAB 1000IU (100/BT) 2000 UNITS: 25 TAB at 08:15

## 2018-05-28 RX ADMIN — LISINOPRIL 20 MG: 20 TABLET ORAL at 08:15

## 2018-05-28 RX ADMIN — PALIPERIDONE 3 MG: 3 TABLET, EXTENDED RELEASE ORAL at 08:15

## 2018-05-28 RX ADMIN — OLANZAPINE 5 MG: 5 TABLET, FILM COATED ORAL at 20:35

## 2018-05-28 ASSESSMENT — ACTIVITIES OF DAILY LIVING (ADL)
DRESS: STREET CLOTHES;INDEPENDENT
LAUNDRY: WITH SUPERVISION
LAUNDRY: UNABLE TO COMPLETE
ORAL_HYGIENE: INDEPENDENT
ORAL_HYGIENE: PROMPTS
DRESS: INDEPENDENT
HYGIENE/GROOMING: INDEPENDENT
GROOMING: PROMPTS

## 2018-05-28 NOTE — PLAN OF CARE
Problem: Decreased Participation/Engagement (Depressive Signs/Symptoms) (Adult)  Goal: Increased Participation/Engagement (Depressive Signs/Symptoms)  Outcome: No Change  48 Hour Nursing Assessment:  Day 26 of hospitalization.  Nabor spends the majority of his time in his room on his bed.  He is frustrated with how long it is taking to get placement.  He wants to blame the [facility] instead of his previous behaviors or the insurance companies restriction of where he can go.  He is angry with [facility] and that he owes them so much money.  When encouraged to follow unit rules such as not being on the phone during groups, he flares and becomes irritable with staff.  He denies SI/SIB/hallucinations.

## 2018-05-29 PROCEDURE — 25000132 ZZH RX MED GY IP 250 OP 250 PS 637: Performed by: PSYCHIATRY & NEUROLOGY

## 2018-05-29 PROCEDURE — 12400007 ZZH R&B MH INTERMEDIATE UMMC

## 2018-05-29 PROCEDURE — 99232 SBSQ HOSP IP/OBS MODERATE 35: CPT | Performed by: PSYCHIATRY & NEUROLOGY

## 2018-05-29 RX ADMIN — CYANOCOBALAMIN TAB 1000 MCG 1000 MCG: 1000 TAB at 09:03

## 2018-05-29 RX ADMIN — OLANZAPINE 5 MG: 5 TABLET, FILM COATED ORAL at 19:33

## 2018-05-29 RX ADMIN — PALIPERIDONE 3 MG: 3 TABLET, EXTENDED RELEASE ORAL at 09:03

## 2018-05-29 RX ADMIN — NICOTINE 1 PATCH: 21 PATCH, EXTENDED RELEASE TRANSDERMAL at 19:31

## 2018-05-29 RX ADMIN — MIRTAZAPINE 15 MG: 15 TABLET, FILM COATED ORAL at 19:32

## 2018-05-29 RX ADMIN — VITAMIN D, TAB 1000IU (100/BT) 2000 UNITS: 25 TAB at 09:03

## 2018-05-29 RX ADMIN — DIVALPROEX SODIUM 2000 MG: 500 TABLET, EXTENDED RELEASE ORAL at 19:32

## 2018-05-29 RX ADMIN — LISINOPRIL 20 MG: 20 TABLET ORAL at 09:03

## 2018-05-29 ASSESSMENT — ACTIVITIES OF DAILY LIVING (ADL)
ORAL_HYGIENE: INDEPENDENT
DRESS: STREET CLOTHES
GROOMING: INDEPENDENT
GROOMING: INDEPENDENT
ORAL_HYGIENE: INDEPENDENT
DRESS: INDEPENDENT
LAUNDRY: WITH SUPERVISION
LAUNDRY: WITH SUPERVISION

## 2018-05-29 NOTE — PROGRESS NOTES
Pt was isolative and withdrawn to his room sleeping the majority of the shift. Pt denies SI/SIB, hallucinations, and medication side affects. He states he is waiting to go to treatment, and bored.      05/29/18 1411   Behavioral Health   Hallucinations denies / not responding to hallucinations   Thinking distractable;poor concentration   Orientation person: oriented;place: oriented;date: oriented;time: oriented   Memory baseline memory   Insight poor   Judgement impaired   Eye Contact at examiner   Affect blunted, flat   Mood mood is calm   Physical Appearance/Attire attire appropriate to age and situation   Hygiene neglected grooming - unclean body, hair, teeth   Suicidality other (see comments)  (denies)   1. Wish to be Dead No   2. Non-Specific Active Suicidal Thoughts  No   Self Injury other (see comment)  (denies )   Elopement (No current concerns )   Activity withdrawn;isolative   Speech coherent   Medication Sensitivity no observed side effects;no stated side effects   Psychomotor / Gait balanced;steady   Psycho Education   Type of Intervention 1:1 intervention   Response participates, initiates socially appropriate   Hours 0.5   Treatment Detail check in    Activities of Daily Living   Hygiene/Grooming independent   Oral Hygiene independent   Dress independent   Laundry with supervision   Room Organization independent   Activity   Activity Assistance Provided independent

## 2018-05-29 NOTE — PROGRESS NOTES
[facility], [place]   Psychiatric Progress Note        Interim History:     The patient's care was discussed with the treatment team during the daily team meeting and/or staff's chart notes were reviewed.      Reason for Admission: Patient has a history of schizoaffective disorder vs Bipolar affective disorder and stimulant use disorder. Was admitted by his CM because of disorganized behavior and violating conditions of PD. PD was subsequently revoked.     Interim: Overall, the patient seems to be passing time on the unit mostly in his room and out for meals. He future oriented, and was glad that we were looking into programs that will be funded by insurance. Felt ok with tapering down/discontining his PO Invega dose. Denies any current complaints. Denied SI/HI/AH/VH.          Medications:       cholecalciferol  2,000 Units Oral Daily     cyanocobalamin  1,000 mcg Oral Daily     divalproex sodium extended-release  2,000 mg Oral At Bedtime     lisinopril  20 mg Oral Daily     mirtazapine  15 mg Oral At Bedtime     nicotine  1 patch Transdermal Daily     nicotine   Transdermal Q8H     nicotine   Transdermal Daily     OLANZapine  5 mg Oral At Bedtime     paliperidone  156 mg Intramuscular Q28 Days          Allergies:   No Known Allergies       Labs:     No results found for this or any previous visit (from the past 24 hour(s)).       Psychiatric Examination:     BP (!) 162/100 (BP Location: Left arm)  Pulse 87  Temp 98.2  F (36.8  C) (Oral)  Resp 16  Ht 1.829 m (6')  Wt 112.9 kg (249 lb)  SpO2 97%  BMI 33.77 kg/m2  Weight is 249 lbs 0 oz  Body mass index is 33.77 kg/(m^2).                                  Sitting Orthostatic BP: 141/106         Standing Orthostatic BP: 142/100      Appearance: awake, alert and dressed in hospital scrubs  Attitude: more  cooperative  Eye Contact:  fair  Mood:  anxious  Affect:  constricted mobility  Speech:  clear, coherent  Psychomotor Behavior:  no  evidence of tardive dyskinesia, dystonia, or tics  Throught Process:  logical and linear  Associations:  no loose associations  Thought Content:  no evidence of suicidal ideation or homicidal ideation and no evidence of openly psychotic thoughts? it is possible, though, that patient has some paranoia.   Insight: partial  Judgement:  fair  Oriented to:  time, person, and place  Attention Span and Concentration:  fair  Recent and Remote Memory:  fair    Clinical Global Impressions  First:  Considering your total clinical experience with this particular patient population, how severe are the patient's symptoms at this time?: 6 (05/03/18 1825)  Compared to the patient's condition at the START of treatment, this patient's condition is:: 4 (05/03/18 1825)  Most recent:  Considering your total clinical experience with this particular patient population, how severe are the patient's symptoms at this time?: 4 (5/17/2018 )  Compared to the patient's condition at the START of treatment, this patient's condition is: 3 (5/17/2018 )    # Pain Assessment:  Current Pain Score 5/29/2018   Patient currently in pain? denies   Pain score (0-10) -   Pain location -   Pain descriptors -   Nabor s pain level was assessed, he denies pain today.             Precautions:     Behavioral Orders   Procedures     Code 1 - Restrict to Unit     Routine Programming     As clinically indicated     Status 15     Every 15 minutes.     Suicide precautions     Patients on Suicide Precautions should have a Combination Diet ordered that includes a Diet selection(s) AND a Behavioral Tray selection for Safe Tray - with utensils, or Safe Tray - NO utensils            DIagnoses:     Bipolar affective disorder, mixed, history of diagnosis of schizoaffective disorder and schizophrenia as well, history of cannabis use disorder, rule out stimulant use disorder.          Plan:   Patient overall has been the same as before. He seems rather board with being  hospitalized, and denied any imminent issues thus far. He seems to be sleeping in his room for most of the day, although comes out of his room for morning check in and for meals, thus it seems he picks and chooses which activity to participate in. Generally speaking, he may be at baseline regarding his behavior. Nevertheless, his multiple daytime neuroleptics could be a contributing factor to his daytime somnolence. He seems to be taking Invega Susstenna injections QMonth (for a while now), thus I will finish the Invega PO taper and finally discontinue the PO formulation tomorrow.    Medications:  - Depakote 2000 mg HS  - Remeron 15 mg HS  - Zyprexa 10 mg HS  - Invega Susstenna  mg (next dose 6/15).   - Invega PO dose reduced to 3 mg Daily .     Legal: Committed     Dispo: Awaiting placement. Awaiting        Jamaal Golden MD  [facility]

## 2018-05-29 NOTE — PROGRESS NOTES
Social in the milieu. Active particip in Community meeting. Social at times with peers and staff. Blunted affect, calm mood. Presents distracted with poor concentration. Restless, back and forth between room and lounge much of the evening. Speech dysarthric. Ate meals. No shower.       05/28/18 1900   Behavioral Health   Hallucinations denies / not responding to hallucinations   Thinking distractable;poor concentration   Orientation person: oriented;place: oriented;date: oriented;time: oriented   Memory baseline memory   Insight poor   Judgement impaired   Eye Contact at examiner   Affect blunted, flat   Mood mood is calm   Physical Appearance/Attire attire appropriate to age and situation;untidy   Hygiene neglected grooming - unclean body, hair, teeth   1. Wish to be Dead No   2. Non-Specific Active Suicidal Thoughts  No   Activity restless   Speech coherent;other (see comments)  (Dysarthric)   Psychomotor / Gait balanced;steady   Psycho Education   Type of Intervention structured groups   Response participates, initiates socially appropriate   Hours 0.5   Treatment Detail Community mt   Activities of Daily Living   Hygiene/Grooming independent   Oral Hygiene independent   Dress independent   Laundry with supervision   Room Organization independent

## 2018-05-30 PROCEDURE — 25000132 ZZH RX MED GY IP 250 OP 250 PS 637: Performed by: PSYCHIATRY & NEUROLOGY

## 2018-05-30 PROCEDURE — 12400007 ZZH R&B MH INTERMEDIATE UMMC

## 2018-05-30 RX ADMIN — NICOTINE 1 PATCH: 21 PATCH, EXTENDED RELEASE TRANSDERMAL at 19:36

## 2018-05-30 RX ADMIN — OLANZAPINE 5 MG: 5 TABLET, FILM COATED ORAL at 19:37

## 2018-05-30 RX ADMIN — LISINOPRIL 20 MG: 20 TABLET ORAL at 08:58

## 2018-05-30 RX ADMIN — DIVALPROEX SODIUM 2000 MG: 500 TABLET, EXTENDED RELEASE ORAL at 19:37

## 2018-05-30 RX ADMIN — MIRTAZAPINE 15 MG: 15 TABLET, FILM COATED ORAL at 19:36

## 2018-05-30 RX ADMIN — CYANOCOBALAMIN TAB 1000 MCG 1000 MCG: 1000 TAB at 08:58

## 2018-05-30 RX ADMIN — VITAMIN D, TAB 1000IU (100/BT) 2000 UNITS: 25 TAB at 08:58

## 2018-05-30 ASSESSMENT — ACTIVITIES OF DAILY LIVING (ADL)
ORAL_HYGIENE: INDEPENDENT
DRESS: STREET CLOTHES
LAUNDRY: WITH SUPERVISION
GROOMING: INDEPENDENT

## 2018-05-30 NOTE — PROGRESS NOTES
05/29/18 2223   Behavioral Health   Hallucinations denies / not responding to hallucinations   Thinking distractable;poor concentration   Orientation time: oriented;date: oriented;place: oriented;person: oriented   Memory baseline memory   Insight poor   Judgement impaired   Eye Contact at examiner   Affect full range affect   Mood mood is calm   Physical Appearance/Attire appears stated age   Hygiene well groomed   Suicidality other (see comments)  (Denies)   1. Wish to be Dead No   2. Non-Specific Active Suicidal Thoughts  No   Self Injury other (see comment)  (Denies)   Activity isolative;withdrawn;restless   Speech clear;coherent   Medication Sensitivity sedation   Psychomotor / Gait balanced;steady   Psycho Education   Type of Intervention 1:1 intervention   Response participates, initiates socially appropriate   Hours 0.5   Activities of Daily Living   Hygiene/Grooming independent   Oral Hygiene independent   Dress street clothes   Laundry with supervision   Room Organization independent   Activity   Activity Assistance Provided independent   Pt observed sleeping in his room and when in lounge . Pt appears to be sedated due to when sitting in the watching TV , he goes to sleep immediately. He appears cooperative and pleasant and most of the time he is out for meals or making requests. He denies suicidal ideation or being depressed.

## 2018-05-30 NOTE — PROGRESS NOTES
Writer called [facility] to see if pt has been accepted. He was denied due to mental health needs.    Writer called pt's SARAVANAN Chen for help with placement issues. She will try to get pt on MA so we can transfer p to an IRTs. [facility] since [facility] is committed.     Writer faxed a referral to [facility] for their review.

## 2018-05-30 NOTE — PROGRESS NOTES
Ate breakfast and called to the window for his morning medications.  He absolutely refused to get his vitals.  This RN gave him his Lisinopril despite him not getting his vitals as his BP generally runs around 150 - 160 systolic each day.

## 2018-05-31 PROCEDURE — 25000132 ZZH RX MED GY IP 250 OP 250 PS 637: Performed by: PSYCHIATRY & NEUROLOGY

## 2018-05-31 PROCEDURE — 12400007 ZZH R&B MH INTERMEDIATE UMMC

## 2018-05-31 RX ORDER — OLANZAPINE 5 MG/1
5 TABLET ORAL DAILY
Status: DISCONTINUED | OUTPATIENT
Start: 2018-05-31 | End: 2018-06-07

## 2018-05-31 RX ADMIN — NICOTINE 1 PATCH: 21 PATCH, EXTENDED RELEASE TRANSDERMAL at 20:31

## 2018-05-31 RX ADMIN — OLANZAPINE 5 MG: 5 TABLET, FILM COATED ORAL at 20:31

## 2018-05-31 RX ADMIN — MIRTAZAPINE 15 MG: 15 TABLET, FILM COATED ORAL at 20:31

## 2018-05-31 RX ADMIN — CYANOCOBALAMIN TAB 1000 MCG 1000 MCG: 1000 TAB at 09:01

## 2018-05-31 RX ADMIN — OLANZAPINE 5 MG: 5 TABLET, FILM COATED ORAL at 15:53

## 2018-05-31 RX ADMIN — DIVALPROEX SODIUM 2000 MG: 500 TABLET, EXTENDED RELEASE ORAL at 20:31

## 2018-05-31 RX ADMIN — LISINOPRIL 20 MG: 20 TABLET ORAL at 09:01

## 2018-05-31 RX ADMIN — VITAMIN D, TAB 1000IU (100/BT) 2000 UNITS: 25 TAB at 09:01

## 2018-05-31 NOTE — PROGRESS NOTES
"  Patient continues to spend much of shift in room and in bed.  He also declines all groups.  Patient states he is \"passing time\" until he goes to treatment. Patient expressed frustration with having to wait in the hospital.  Patient states he would like to stay with his sister until a treatment placement opens up.  Denies SI/SIB     05/31/18 1404   Behavioral Health   Hallucinations denies / not responding to hallucinations   Thinking distractable   Orientation person: oriented;place: oriented;date: oriented;time: oriented   Memory baseline memory   Insight insight appropriate to situation   Judgement impaired   Eye Contact at examiner   Affect full range affect   Mood mood is calm   Physical Appearance/Attire attire appropriate to age and situation   Hygiene well groomed   Suicidality other (see comments)  (denies)   1. Wish to be Dead No   2. Non-Specific Active Suicidal Thoughts  No   Self Injury other (see comment)  (denies)   Elopement (NA)   Activity isolative;withdrawn   Speech clear;coherent   Medication Sensitivity no stated side effects   Psychomotor / Gait balanced;steady   Psycho Education   Type of Intervention 1:1 intervention   Response participates, initiates socially appropriate   Hours 0.5     "

## 2018-06-01 PROCEDURE — 12400007 ZZH R&B MH INTERMEDIATE UMMC

## 2018-06-01 PROCEDURE — 25000132 ZZH RX MED GY IP 250 OP 250 PS 637: Performed by: PSYCHIATRY & NEUROLOGY

## 2018-06-01 PROCEDURE — 99231 SBSQ HOSP IP/OBS SF/LOW 25: CPT | Performed by: PSYCHIATRY & NEUROLOGY

## 2018-06-01 RX ADMIN — OLANZAPINE 5 MG: 5 TABLET, FILM COATED ORAL at 19:58

## 2018-06-01 RX ADMIN — CYANOCOBALAMIN TAB 1000 MCG 1000 MCG: 1000 TAB at 08:45

## 2018-06-01 RX ADMIN — DIVALPROEX SODIUM 2000 MG: 500 TABLET, EXTENDED RELEASE ORAL at 19:58

## 2018-06-01 RX ADMIN — VITAMIN D, TAB 1000IU (100/BT) 2000 UNITS: 25 TAB at 08:45

## 2018-06-01 RX ADMIN — OLANZAPINE 5 MG: 5 TABLET, FILM COATED ORAL at 08:45

## 2018-06-01 RX ADMIN — IBUPROFEN 600 MG: 600 TABLET ORAL at 10:52

## 2018-06-01 RX ADMIN — MIRTAZAPINE 15 MG: 15 TABLET, FILM COATED ORAL at 19:58

## 2018-06-01 RX ADMIN — NICOTINE 1 PATCH: 21 PATCH, EXTENDED RELEASE TRANSDERMAL at 19:57

## 2018-06-01 RX ADMIN — LISINOPRIL 20 MG: 20 TABLET ORAL at 08:45

## 2018-06-01 ASSESSMENT — ACTIVITIES OF DAILY LIVING (ADL)
GROOMING: INDEPENDENT
DRESS: STREET CLOTHES;INDEPENDENT
ORAL_HYGIENE: INDEPENDENT
LAUNDRY: WITH SUPERVISION

## 2018-06-01 NOTE — PROGRESS NOTES
[facility], [place]   Psychiatric Progress Note        Interim History:     The patient's care was discussed with the treatment team during the daily team meeting and/or staff's chart notes were reviewed.      Reason for Admission: Patient has a history of schizoaffective disorder vs Bipolar affective disorder and stimulant use disorder. Was admitted by his CM because of disorganized behavior and violating conditions of PD. PD was subsequently revoked.     Interim: Overall, the patient seems to be passing time on the unit mostly in his room and out for meals. He future oriented, and was glad that we were looking into programs that will be funded by insurance. Felt anxious about where he would be placed in the future. Denies any current complaints. Denied SI/HI/AH/VH.          Medications:       cholecalciferol  2,000 Units Oral Daily     cyanocobalamin  1,000 mcg Oral Daily     divalproex sodium extended-release  2,000 mg Oral At Bedtime     lisinopril  20 mg Oral Daily     mirtazapine  15 mg Oral At Bedtime     nicotine  1 patch Transdermal Daily     nicotine   Transdermal Q8H     nicotine   Transdermal Daily     OLANZapine  5 mg Oral Daily     OLANZapine  5 mg Oral At Bedtime     paliperidone  156 mg Intramuscular Q28 Days          Allergies:   No Known Allergies       Labs:     No results found for this or any previous visit (from the past 24 hour(s)).       Psychiatric Examination:     BP (!) 142/101  Pulse 97  Temp 96  F (35.6  C) (Oral)  Resp 17  Ht 1.829 m (6')  Wt 112.5 kg (248 lb)  SpO2 97%  BMI 33.63 kg/m2  Weight is 248 lbs 0 oz  Body mass index is 33.63 kg/(m^2).                                  Sitting Orthostatic BP: 141/106         Standing Orthostatic BP: 142/100      Appearance: awake, alert and dressed in hospital scrubs  Attitude: more  cooperative  Eye Contact:  fair  Mood:  anxious  Affect:  constricted mobility  Speech:  clear, coherent  Psychomotor Behavior:   no evidence of tardive dyskinesia, dystonia, or tics  Throught Process:  logical and linear  Associations:  no loose associations  Thought Content:  no evidence of suicidal ideation or homicidal ideation and no evidence of openly psychotic thoughts? it is possible, though, that patient has some paranoia.   Insight: partial  Judgement:  fair  Oriented to:  time, person, and place  Attention Span and Concentration:  fair  Recent and Remote Memory:  fair    Clinical Global Impressions  First:  Considering your total clinical experience with this particular patient population, how severe are the patient's symptoms at this time?: 6 (05/03/18 1825)  Compared to the patient's condition at the START of treatment, this patient's condition is:: 4 (05/03/18 1825)  Most recent:  Considering your total clinical experience with this particular patient population, how severe are the patient's symptoms at this time?: 4 (5/17/2018 )  Compared to the patient's condition at the START of treatment, this patient's condition is: 3 (5/17/2018 )    # Pain Assessment:  Current Pain Score 5/29/2018   Patient currently in pain? denies   Pain score (0-10) -   Pain location -   Pain descriptors -   Nabor s pain level was assessed, he denies pain today.             Precautions:     Behavioral Orders   Procedures     Code 1 - Restrict to Unit     Routine Programming     As clinically indicated     Status 15     Every 15 minutes.     Suicide precautions     Patients on Suicide Precautions should have a Combination Diet ordered that includes a Diet selection(s) AND a Behavioral Tray selection for Safe Tray - with utensils, or Safe Tray - NO utensils            DIagnoses:     Bipolar affective disorder, mixed, history of diagnosis of schizoaffective disorder and schizophrenia as well, history of cannabis use disorder, rule out stimulant use disorder.          Plan:   Patient overall has been the same as before. He seems rather board with being  hospitalized, and denied any imminent issues thus far. He seems to be sleeping in his room for most of the day, although comes out of his room for morning check in and for meals, thus it seems he picks and chooses which activity to participate in. Generally speaking, he may be at baseline regarding his behavior. Added 5 mg Zyprexa daytime to help attenuate daytime ruminative racing thoughts and anxiety.     Medications:  - Depakote 2000 mg HS  - Remeron 15 mg HS  - Zyprexa 5 mg AM and 5 mg HS   - Invega Susstenna  mg (next dose 6/15).     Legal: Committed     Dispo: Awaiting placement. Awaiting        Jamaal Golden MD  [facility]iat[facility]

## 2018-06-01 NOTE — PLAN OF CARE
Problem: Mood Impairment (Depressive Signs/Symptoms) (Adult)  Goal: Improved Mood Symptoms (Depressive Signs/Symptoms)  Outcome: Improving  Pt confused about his discharge thought it was today, spoke with SARAVANAN Berrios and she clarified that it would not be today. Pt accepted fact he wasn't discharging, mood  Is calm he denies SI or AH/VH..

## 2018-06-01 NOTE — PROGRESS NOTES
Pt spoke with [facility] this evening.     Pt BP continues to run high. Will continue to monitor.

## 2018-06-02 PROCEDURE — 25000132 ZZH RX MED GY IP 250 OP 250 PS 637: Performed by: PSYCHIATRY & NEUROLOGY

## 2018-06-02 PROCEDURE — 12400007 ZZH R&B MH INTERMEDIATE UMMC

## 2018-06-02 RX ADMIN — OLANZAPINE 5 MG: 5 TABLET, FILM COATED ORAL at 20:50

## 2018-06-02 RX ADMIN — DIVALPROEX SODIUM 2000 MG: 500 TABLET, EXTENDED RELEASE ORAL at 20:50

## 2018-06-02 RX ADMIN — VITAMIN D, TAB 1000IU (100/BT) 2000 UNITS: 25 TAB at 09:25

## 2018-06-02 RX ADMIN — OLANZAPINE 5 MG: 5 TABLET, FILM COATED ORAL at 09:25

## 2018-06-02 RX ADMIN — NICOTINE 1 PATCH: 21 PATCH, EXTENDED RELEASE TRANSDERMAL at 20:54

## 2018-06-02 RX ADMIN — MIRTAZAPINE 15 MG: 15 TABLET, FILM COATED ORAL at 20:50

## 2018-06-02 RX ADMIN — CYANOCOBALAMIN TAB 1000 MCG 1000 MCG: 1000 TAB at 09:25

## 2018-06-02 RX ADMIN — IBUPROFEN 600 MG: 600 TABLET ORAL at 11:51

## 2018-06-02 RX ADMIN — LISINOPRIL 20 MG: 20 TABLET ORAL at 09:25

## 2018-06-02 ASSESSMENT — ACTIVITIES OF DAILY LIVING (ADL)
GROOMING: INDEPENDENT
LAUNDRY: WITH SUPERVISION
DRESS: INDEPENDENT
GROOMING: INDEPENDENT
DRESS: INDEPENDENT
ORAL_HYGIENE: INDEPENDENT
ORAL_HYGIENE: INDEPENDENT

## 2018-06-02 NOTE — PROGRESS NOTES
06/02/18 1452   Behavioral Health   Hallucinations denies / not responding to hallucinations   Thinking poor concentration   Orientation person: oriented;place: oriented;date: oriented   Memory baseline memory   Insight admits / accepts   Judgement impaired   Eye Contact at examiner   Affect blunted, flat   Mood mood is calm   Physical Appearance/Attire attire appropriate to age and situation   Hygiene well groomed   Suicidality other (see comments)  (Denied)   1. Wish to be Dead No   2. Non-Specific Active Suicidal Thoughts  No   Self Injury other (see comment)  (denies)   Elopement (nothing to report)   Activity isolative;withdrawn   Speech clear;coherent   Medication Sensitivity no observed side effects   Psychomotor / Gait balanced;steady   Psycho Education   Type of Intervention 1:1 intervention   Response participates, initiates socially appropriate   Hours 0.5   Treatment Detail (Check In)   Activities of Daily Living   Hygiene/Grooming independent   Oral Hygiene independent   Dress independent   Room Organization independent   The patient was in his room most of the shift, but did come down for both meals this shift.  The patient did not attend any groups this shift and was not social with peers.  The patient reports they are going to be going to an IRTS program on Monday and they are ready to discharge.  The patient was minorly irritable, but much better the the prior day about having to wait through the weekend to discharge.  The patient did not have an goals this shift. The patient denies SI and SiB.

## 2018-06-02 NOTE — PROGRESS NOTES
[facility], [place]   Psychiatric Progress Note        Interim History:     The patient's care was discussed with the treatment team during the daily team meeting and/or staff's chart notes were reviewed.      Reason for Admission: Patient has a history of schizoaffective disorder vs Bipolar affective disorder and stimulant use disorder. Was admitted by his CM because of disorganized behavior and violating conditions of PD. PD was subsequently revoked.     Interim: Overall, the patient seems to be passing time on the unit mostly in his room and out for meals. He future oriented, and was glad that we were looking into programs that will be funded by insurance. Felt anxious about where he would be placed in the future. Denies any current complaints. Denied SI/HI/AH/VH.          Medications:       cholecalciferol  2,000 Units Oral Daily     cyanocobalamin  1,000 mcg Oral Daily     divalproex sodium extended-release  2,000 mg Oral At Bedtime     lisinopril  20 mg Oral Daily     mirtazapine  15 mg Oral At Bedtime     nicotine  1 patch Transdermal Daily     nicotine   Transdermal Q8H     nicotine   Transdermal Daily     OLANZapine  5 mg Oral Daily     OLANZapine  5 mg Oral At Bedtime     paliperidone  156 mg Intramuscular Q28 Days          Allergies:   No Known Allergies       Labs:     No results found for this or any previous visit (from the past 24 hour(s)).       Psychiatric Examination:     BP (!) 148/93  Pulse 89  Temp 97.6  F (36.4  C) (Tympanic)  Resp 16  Ht 1.829 m (6')  Wt 112.5 kg (248 lb)  SpO2 97%  BMI 33.63 kg/m2  Weight is 248 lbs 0 oz  Body mass index is 33.63 kg/(m^2).                                  Sitting Orthostatic BP: 141/106         Standing Orthostatic BP: 142/100      Appearance: awake, alert and dressed in hospital scrubs  Attitude: more  cooperative  Eye Contact:  fair  Mood:  anxious  Affect:  constricted mobility  Speech:  clear, coherent  Psychomotor  Behavior:  no evidence of tardive dyskinesia, dystonia, or tics  Throught Process:  logical and linear  Associations:  no loose associations  Thought Content:  no evidence of suicidal ideation or homicidal ideation and no evidence of openly psychotic thoughts? it is possible, though, that patient has some paranoia.   Insight: partial  Judgement:  fair  Oriented to:  time, person, and place  Attention Span and Concentration:  fair  Recent and Remote Memory:  fair    Clinical Global Impressions  First:  Considering your total clinical experience with this particular patient population, how severe are the patient's symptoms at this time?: 6 (05/03/18 1825)  Compared to the patient's condition at the START of treatment, this patient's condition is:: 4 (05/03/18 1825)  Most recent:  Considering your total clinical experience with this particular patient population, how severe are the patient's symptoms at this time?: 4 (5/17/2018 )  Compared to the patient's condition at the START of treatment, this patient's condition is: 3 (5/17/2018 )    # Pain Assessment:  Current Pain Score 5/29/2018   Patient currently in pain? denies   Pain score (0-10) -   Pain location -   Pain descriptors -   Nabor s pain level was assessed, he denies pain today.             Precautions:     Behavioral Orders   Procedures     Code 1 - Restrict to Unit     Routine Programming     As clinically indicated     Status 15     Every 15 minutes.     Suicide precautions     Patients on Suicide Precautions should have a Combination Diet ordered that includes a Diet selection(s) AND a Behavioral Tray selection for Safe Tray - with utensils, or Safe Tray - NO utensils            DIagnoses:     Bipolar affective disorder, mixed, history of diagnosis of schizoaffective disorder and schizophrenia as well, history of cannabis use disorder, rule out stimulant use disorder.          Plan:   Patient overall has been the same as before. He seems rather board with  being hospitalized, and denied any imminent issues thus far. He seems to be sleeping in his room for most of the day, although comes out of his room for morning check in and for meals, thus it seems he picks and chooses which activity to participate in. Generally speaking, he may be at baseline regarding his behavior. Added 5 mg Zyprexa daytime to help attenuate daytime ruminative racing thoughts and anxiety.     Medications:  - Depakote 2000 mg HS  - Remeron 15 mg HS  - Zyprexa 5 mg AM and 5 mg HS   - Invega Susstenna  mg (next dose 6/15).     Legal: Committed     Dispo: Awaiting placement. Awaiting        Jamaal Golden MD  [facility]

## 2018-06-02 NOTE — PLAN OF CARE
Problem: Mood Impairment (Depressive Signs/Symptoms) (Adult)  Goal: Improved Mood Symptoms (Depressive Signs/Symptoms)  Outcome: Improving  48 Hour Assessment    BP (!) 148/93  Pulse 89  Temp 97.6  F (36.4  C) (Tympanic)  Resp 16  Ht 1.829 m (6')  Wt 112.5 kg (248 lb)  SpO2 97%  BMI 33.63 kg/m2    Pt visible in milieu off and on throughout the evening. He is pleasant upon approach, and selectively social. He continues to express frustration regarding the process of finding placement. Feels that he should be able to be discharged since he has a bed available at [facility]. It is unclear to this writer if there is in fact a bed ready for pt.     Pt states feeling less drowsy than on previous days. However, he continues to nap quite a bit throughout the day.       SI/SIB: Pt currently denies. Has not made gestures that indicate suicidality.     Auditory/Visual Hallucinations: Pt denies, does not appear responding.     Took all medications without issue. No additional concerns at this time. Will continue to assess.

## 2018-06-03 PROCEDURE — 25000132 ZZH RX MED GY IP 250 OP 250 PS 637: Performed by: PSYCHIATRY & NEUROLOGY

## 2018-06-03 PROCEDURE — 12400007 ZZH R&B MH INTERMEDIATE UMMC

## 2018-06-03 RX ADMIN — DIVALPROEX SODIUM 2000 MG: 500 TABLET, EXTENDED RELEASE ORAL at 21:20

## 2018-06-03 RX ADMIN — OLANZAPINE 5 MG: 5 TABLET, FILM COATED ORAL at 09:03

## 2018-06-03 RX ADMIN — NICOTINE 1 PATCH: 21 PATCH, EXTENDED RELEASE TRANSDERMAL at 21:19

## 2018-06-03 RX ADMIN — VITAMIN D, TAB 1000IU (100/BT) 2000 UNITS: 25 TAB at 09:02

## 2018-06-03 RX ADMIN — OLANZAPINE 5 MG: 5 TABLET, FILM COATED ORAL at 21:20

## 2018-06-03 RX ADMIN — CYANOCOBALAMIN TAB 1000 MCG 1000 MCG: 1000 TAB at 09:03

## 2018-06-03 RX ADMIN — MIRTAZAPINE 15 MG: 15 TABLET, FILM COATED ORAL at 21:20

## 2018-06-03 RX ADMIN — LISINOPRIL 20 MG: 20 TABLET ORAL at 09:02

## 2018-06-03 ASSESSMENT — ACTIVITIES OF DAILY LIVING (ADL)
DRESS: INDEPENDENT
LAUNDRY: WITH SUPERVISION
GROOMING: INDEPENDENT
ORAL_HYGIENE: INDEPENDENT
GROOMING: INDEPENDENT
DRESS: STREET CLOTHES;INDEPENDENT
ORAL_HYGIENE: INDEPENDENT

## 2018-06-03 NOTE — PROGRESS NOTES
BP (!) 130/94 (BP Location: Right arm)  Pulse 88  Temp 97.7  F (36.5  C) (Oral)  Resp 16  Ht 1.829 m (6')  Wt 112.5 kg (248 lb)  SpO2 97%  BMI 33.63 kg/m2    Pt visible on and off throughout this evening. He took all medications without issue, and denies side effects. Calm and pleasant on unit. Will continue to assess and offer support.

## 2018-06-04 PROCEDURE — 25000132 ZZH RX MED GY IP 250 OP 250 PS 637: Performed by: PSYCHIATRY & NEUROLOGY

## 2018-06-04 PROCEDURE — 12400007 ZZH R&B MH INTERMEDIATE UMMC

## 2018-06-04 PROCEDURE — 99231 SBSQ HOSP IP/OBS SF/LOW 25: CPT | Performed by: PSYCHIATRY & NEUROLOGY

## 2018-06-04 RX ADMIN — MIRTAZAPINE 15 MG: 15 TABLET, FILM COATED ORAL at 20:31

## 2018-06-04 RX ADMIN — CYANOCOBALAMIN TAB 1000 MCG 1000 MCG: 1000 TAB at 08:30

## 2018-06-04 RX ADMIN — DIVALPROEX SODIUM 2000 MG: 500 TABLET, EXTENDED RELEASE ORAL at 20:31

## 2018-06-04 RX ADMIN — NICOTINE 1 PATCH: 21 PATCH, EXTENDED RELEASE TRANSDERMAL at 20:31

## 2018-06-04 RX ADMIN — LISINOPRIL 20 MG: 20 TABLET ORAL at 08:30

## 2018-06-04 RX ADMIN — VITAMIN D, TAB 1000IU (100/BT) 2000 UNITS: 25 TAB at 08:30

## 2018-06-04 RX ADMIN — OLANZAPINE 5 MG: 5 TABLET, FILM COATED ORAL at 20:31

## 2018-06-04 RX ADMIN — OLANZAPINE 5 MG: 5 TABLET, FILM COATED ORAL at 08:30

## 2018-06-04 ASSESSMENT — ACTIVITIES OF DAILY LIVING (ADL)
GROOMING: INDEPENDENT
DRESS: STREET CLOTHES;INDEPENDENT
LAUNDRY: WITH SUPERVISION
ORAL_HYGIENE: INDEPENDENT

## 2018-06-04 NOTE — PROGRESS NOTES
Patient spends much of shift in room and sleeping.  Patient was agitated this morning due to not being d/c'd as he was hoping for today.  Patient used many profanities toward staff but was able to go to his room on his own and calmed within 15 minutes.  Patient declined all groups.  Patient denies SI/SIB.       06/04/18 1404   Behavioral Health   Hallucinations denies / not responding to hallucinations   Thinking distractable   Orientation person: oriented;place: oriented;date: oriented;time: oriented   Memory baseline memory   Insight insight appropriate to situation   Judgement impaired   Eye Contact at examiner   Affect full range affect;irritable   Mood labile;mood is calm   Physical Appearance/Attire attire appropriate to age and situation   Hygiene well groomed   Suicidality other (see comments)  (denies)   1. Wish to be Dead No   2. Non-Specific Active Suicidal Thoughts  No   Self Injury other (see comment)  (denies)   Elopement (NA)   Activity isolative;withdrawn   Speech clear;coherent   Medication Sensitivity no stated side effects   Psychomotor / Gait balanced;steady   Psycho Education   Type of Intervention 1:1 intervention   Response participates, initiates socially appropriate   Hours 0.5

## 2018-06-05 PROCEDURE — 25000132 ZZH RX MED GY IP 250 OP 250 PS 637: Performed by: PSYCHIATRY & NEUROLOGY

## 2018-06-05 PROCEDURE — 12400007 ZZH R&B MH INTERMEDIATE UMMC

## 2018-06-05 PROCEDURE — 99231 SBSQ HOSP IP/OBS SF/LOW 25: CPT | Performed by: PSYCHIATRY & NEUROLOGY

## 2018-06-05 RX ADMIN — CYANOCOBALAMIN TAB 1000 MCG 1000 MCG: 1000 TAB at 08:12

## 2018-06-05 RX ADMIN — OLANZAPINE 5 MG: 5 TABLET, FILM COATED ORAL at 08:12

## 2018-06-05 RX ADMIN — TRAZODONE HYDROCHLORIDE 50 MG: 50 TABLET ORAL at 21:43

## 2018-06-05 RX ADMIN — OLANZAPINE 5 MG: 5 TABLET, FILM COATED ORAL at 19:44

## 2018-06-05 RX ADMIN — VITAMIN D, TAB 1000IU (100/BT) 2000 UNITS: 25 TAB at 08:12

## 2018-06-05 RX ADMIN — DIVALPROEX SODIUM 2000 MG: 500 TABLET, EXTENDED RELEASE ORAL at 19:44

## 2018-06-05 RX ADMIN — NICOTINE 1 PATCH: 21 PATCH, EXTENDED RELEASE TRANSDERMAL at 19:43

## 2018-06-05 RX ADMIN — LISINOPRIL 20 MG: 20 TABLET ORAL at 08:12

## 2018-06-05 RX ADMIN — MIRTAZAPINE 15 MG: 15 TABLET, FILM COATED ORAL at 19:44

## 2018-06-05 ASSESSMENT — ACTIVITIES OF DAILY LIVING (ADL)
HYGIENE/GROOMING: INDEPENDENT
DRESS: INDEPENDENT
ORAL_HYGIENE: INDEPENDENT

## 2018-06-05 NOTE — PLAN OF CARE
Problem: Mood Impairment (Depressive Signs/Symptoms) (Adult)  Goal: Improved Mood Symptoms (Depressive Signs/Symptoms)  Outcome: Improving  48 Hour Assessment    BP (!) 148/93 (BP Location: Right arm)  Pulse 94  Temp 97.4  F (36.3  C) (Oral)  Resp 16  Ht 1.829 m (6')  Wt 112.5 kg (248 lb)  SpO2 97%  BMI 33.63 kg/m2    Pt visible in milieu on and off throughout the evening. He is social and pleasant with others, and able to respond appropriately to the conversation topic. He attended, and participated, in community meeting, where he addressed a trigger for him being his delayed discharge from the hospital. Pt reports still being hopeful for the future, and is looking forward to discharge.       SI/SIB: Pt denies. No comments or gestures indicating suicidality.     Auditory/Visual Hallucinations: Pt denies, does not appear responding.     Pt took all scheduled medications without issue. No additional concerns at this time. Will continue to assess and offer support.

## 2018-06-05 NOTE — PROGRESS NOTES
Appears to sleep . Loud snoring. Frequently talking loudly. Difficult to hear what he is saying. Does not sound angry or frightened.

## 2018-06-05 NOTE — PROGRESS NOTES
06/05/18 1122   Behavioral Health   Hallucinations denies / not responding to hallucinations   Thinking distractable   Orientation person: oriented;place: oriented   Insight insight appropriate to situation   Judgement impaired   Eye Contact at examiner   Affect other (see comments)  (Neutral)   Mood anxious   Physical Appearance/Attire attire appropriate to age and situation   Suicidality other (see comments)  (Denies at this time)   1. Wish to be Dead No   2. Non-Specific Active Suicidal Thoughts  No   Self Injury other (see comment)  (None reported or observed )   Elopement Statements about wanting to leave   Activity isolative;withdrawn   Speech coherent   Medication Sensitivity no stated side effects   Psychomotor / Gait balanced     Pt has been mostly resting in his room this shift. Up for meals, and to make requests. When visible, pt's affect looked anxious. Pt reports he hopes he can d/c today. During 1:1, he expressed that he's been waiting for placement. At times during conversation pt becomes irritated about his length of stay, but is about to calm himself. Denies thoughts to hurt himself or others. Contracts for safety.

## 2018-06-05 NOTE — PROGRESS NOTES
[facility], [place]   Psychiatric Progress Note        Interim History:     The patient's care was discussed with the treatment team during the daily team meeting and/or staff's chart notes were reviewed.      Reason for Admission: Patient has a history of schizoaffective disorder vs Bipolar affective disorder and stimulant use disorder. Was admitted by his CM because of disorganized behavior and violating conditions of PD. PD was subsequently revoked.     Interim: Overall, the patient seems to be passing time on the unit mostly in his room and out for meals. He future oriented, and was glad that we were looking into programs that will be funded by insurance. Felt anxious about where he would be placed in the future. Denies any current complaints. Denied SI/HI/AH/VH.          Medications:       cholecalciferol  2,000 Units Oral Daily     cyanocobalamin  1,000 mcg Oral Daily     divalproex sodium extended-release  2,000 mg Oral At Bedtime     lisinopril  20 mg Oral Daily     mirtazapine  15 mg Oral At Bedtime     nicotine  1 patch Transdermal Daily     nicotine   Transdermal Q8H     nicotine   Transdermal Daily     OLANZapine  5 mg Oral Daily     OLANZapine  5 mg Oral At Bedtime     paliperidone  156 mg Intramuscular Q28 Days          Allergies:   No Known Allergies       Labs:     No results found for this or any previous visit (from the past 24 hour(s)).       Psychiatric Examination:     BP (!) 148/93 (BP Location: Right arm)  Pulse 94  Temp 97.4  F (36.3  C) (Oral)  Resp 16  Ht 1.829 m (6')  Wt 112.5 kg (248 lb)  SpO2 97%  BMI 33.63 kg/m2  Weight is 248 lbs 0 oz  Body mass index is 33.63 kg/(m^2).                                  Sitting Orthostatic BP: 141/106         Standing Orthostatic BP: 142/100      Appearance: awake, alert and dressed in hospital scrubs  Attitude: more  cooperative  Eye Contact:  fair  Mood:  anxious  Affect:  constricted mobility  Speech:  clear,  coherent  Psychomotor Behavior:  no evidence of tardive dyskinesia, dystonia, or tics  Throught Process:  logical and linear  Associations:  no loose associations  Thought Content:  no evidence of suicidal ideation or homicidal ideation and no evidence of openly psychotic thoughts? it is possible, though, that patient has some paranoia.   Insight: partial  Judgement:  fair  Oriented to:  time, person, and place  Attention Span and Concentration:  fair  Recent and Remote Memory:  fair    Clinical Global Impressions  First:  Considering your total clinical experience with this particular patient population, how severe are the patient's symptoms at this time?: 6 (05/03/18 1825)  Compared to the patient's condition at the START of treatment, this patient's condition is:: 4 (05/03/18 1825)  Most recent:  Considering your total clinical experience with this particular patient population, how severe are the patient's symptoms at this time?: 4 (5/17/2018 )  Compared to the patient's condition at the START of treatment, this patient's condition is: 3 (5/17/2018 )    # Pain Assessment:  Current Pain Score 6/2/2018   Patient currently in pain? denies   Pain score (0-10) -   Pain location -   Pain descriptors -   Nabor s pain level was assessed, he denies pain today.             Precautions:     Behavioral Orders   Procedures     Code 1 - Restrict to Unit     Routine Programming     As clinically indicated     Status 15     Every 15 minutes.     Suicide precautions     Patients on Suicide Precautions should have a Combination Diet ordered that includes a Diet selection(s) AND a Behavioral Tray selection for Safe Tray - with utensils, or Safe Tray - NO utensils            DIagnoses:     Bipolar affective disorder, mixed, history of diagnosis of schizoaffective disorder and schizophrenia as well, history of cannabis use disorder, rule out stimulant use disorder.          Plan:   Patient overall has been the same as before. He  seems rather board with being hospitalized, and denied any imminent issues thus far. He is overall future oriented and looking forward with discharging. He seems to be sleeping in his room for most of the day, although comes out of his room for morning and evening community meeting and for meals, thus it seems he picks and chooses which activity to participate in. Generally speaking, he may be at baseline regarding his behavior. Added 5 mg Zyprexa daytime to help attenuate daytime ruminative racing thoughts and anxiety.     Medications:  - Depakote 2000 mg HS  - Remeron 15 mg HS  - Zyprexa 5 mg AM and 5 mg HS   - Invega Susstenna  mg (next dose 6/15).     Legal: Committed     Dispo: Awaiting placement. Awaiting        Jamaal Golden MD  [facility]

## 2018-06-06 PROCEDURE — 25000132 ZZH RX MED GY IP 250 OP 250 PS 637: Performed by: PSYCHIATRY & NEUROLOGY

## 2018-06-06 PROCEDURE — 12400007 ZZH R&B MH INTERMEDIATE UMMC

## 2018-06-06 PROCEDURE — 25000125 ZZHC RX 250: Performed by: PSYCHIATRY & NEUROLOGY

## 2018-06-06 RX ORDER — HALOPERIDOL 5 MG/1
5-10 TABLET ORAL 3 TIMES DAILY PRN
Status: DISCONTINUED | OUTPATIENT
Start: 2018-06-06 | End: 2018-06-15 | Stop reason: HOSPADM

## 2018-06-06 RX ORDER — HALOPERIDOL 5 MG/ML
10 INJECTION INTRAMUSCULAR 3 TIMES DAILY PRN
Status: DISCONTINUED | OUTPATIENT
Start: 2018-06-06 | End: 2018-06-15 | Stop reason: HOSPADM

## 2018-06-06 RX ADMIN — HALOPERIDOL 10 MG: 5 TABLET ORAL at 13:07

## 2018-06-06 RX ADMIN — VITAMIN D, TAB 1000IU (100/BT) 2000 UNITS: 25 TAB at 08:51

## 2018-06-06 RX ADMIN — TRAZODONE HYDROCHLORIDE 50 MG: 50 TABLET ORAL at 17:34

## 2018-06-06 RX ADMIN — LISINOPRIL 20 MG: 20 TABLET ORAL at 08:51

## 2018-06-06 RX ADMIN — MIRTAZAPINE 15 MG: 15 TABLET, FILM COATED ORAL at 19:37

## 2018-06-06 RX ADMIN — OLANZAPINE 5 MG: 5 TABLET, FILM COATED ORAL at 19:35

## 2018-06-06 RX ADMIN — CYANOCOBALAMIN TAB 1000 MCG 1000 MCG: 1000 TAB at 08:51

## 2018-06-06 RX ADMIN — OLANZAPINE 5 MG: 5 TABLET, FILM COATED ORAL at 08:51

## 2018-06-06 RX ADMIN — NICOTINE 1 PATCH: 21 PATCH, EXTENDED RELEASE TRANSDERMAL at 19:36

## 2018-06-06 RX ADMIN — IBUPROFEN 600 MG: 600 TABLET ORAL at 16:55

## 2018-06-06 RX ADMIN — OLANZAPINE 10 MG: 10 INJECTION, POWDER, LYOPHILIZED, FOR SOLUTION INTRAMUSCULAR at 12:47

## 2018-06-06 RX ADMIN — DIVALPROEX SODIUM 2000 MG: 500 TABLET, EXTENDED RELEASE ORAL at 19:35

## 2018-06-06 ASSESSMENT — ACTIVITIES OF DAILY LIVING (ADL)
GROOMING: INDEPENDENT
LAUNDRY: WITH SUPERVISION
ORAL_HYGIENE: INDEPENDENT
ORAL_HYGIENE: INDEPENDENT
GROOMING: INDEPENDENT
DRESS: STREET CLOTHES
DRESS: INDEPENDENT
LAUNDRY: WITH SUPERVISION

## 2018-06-06 NOTE — PROGRESS NOTES
Pt was given more bad news about his insurance policy and obtaining funding for treatment. His co-pay would be in the thousands of dollars. Upon hearing this information, pt became agitated, aggressive  breaking two pieces of unit furniture and demanding to leave. A code 21 was called.    Pt will need to cancel his insurance in order to get MA. Pt's sister who is also his employer wants pt to receive treatment and stabilize prior to returning to work. Pt does not know this information as he was already agitated. Writer is concerned about delivering this news to pt.    Writer called Dr. Mckeon to consult about transfer to station 12 after pt threatened to destroy the unit and hurt people. Dr. Mckeon wants to wait to see if pt will settle down. This was pt's first code since hospitalized. If pt continues to threaten people Dr. Mckeon will transfer pt to station 12.

## 2018-06-06 NOTE — PLAN OF CARE
"Pt was meeting with the CTCs in his room about his insurance.  Pt was not happy with the suggestion that he should apply for medical assistance.  Pt stormed out of his room very angry and agitated.  Pt was posturing in the blunt appeared very tensed.  Pt threatened to leave the unit and was over heard saying \"call them, let them shot me\".  Staff tried verbal de-escalation, decreased stimulation but pt continued to escalate in a very threatening manner, pt grabbed one of the chairs on the hallway and broke it, at that point a Code 21 was called for pt's safety and others.  Pt was asked to walk to seclusion and pt stated \"I will kill myself\" but instead agreed to walk to his room.  Staff escorted him to his room and pt was given Zyprexa IM 10 mg.    "

## 2018-06-06 NOTE — PROGRESS NOTES
Pt approached this writer, reporting severe anxiety, and feelings of panic due to his continued stay at the hospital. He requested, and received, prn trazodone to help with his sleep. Also encouraged pt to use deep breathing exercises, and distraction techniques. No additional concerns at this time. Will continue to assess.

## 2018-06-06 NOTE — PROGRESS NOTES
"Patient became very agitated this afternoon as was noted by RN previously.  Patient was able to follow directions after getting medications, but continued to be agitated with his situation and continued to make threats.  Patient stated to writer \"I like you and I don't eant to kill you but I would like to paralyze you. \".  \"If I don't get out of here tomorrow, you better put me in restraints.  I am gonna hurt people and destroy this fucking place!\".  Patient continued to make threatening statements until he eventually fell asleep.  Patient is sleeping at this time.  "

## 2018-06-06 NOTE — PROGRESS NOTES
Patient reports increased anxiety, denies SI/SIB. Patient states he feels frustrated at the length of his stay and his lack of control over his discharge. Patient slept in his room for most of the shift but did come out for meals and medications.     06/05/18 2151   Behavioral Health   Hallucinations denies / not responding to hallucinations   Thinking distractable;poor concentration   Orientation person: oriented;place: oriented;date: oriented;time: oriented   Memory baseline memory   Insight poor   Judgement impaired   Eye Contact at examiner   Affect blunted, flat   Mood mood is calm   Physical Appearance/Attire appears stated age;attire appropriate to age and situation   Hygiene well groomed   Suicidality other (see comments)  (Denies)   1. Wish to be Dead No   2. Non-Specific Active Suicidal Thoughts  No   Self Injury other (see comment)  (Denies)   Elopement Statements about wanting to leave   Activity isolative;withdrawn   Speech coherent   Medication Sensitivity no stated side effects;no observed side effects   Psychomotor / Gait balanced;steady   Activities of Daily Living   Hygiene/Grooming independent   Oral Hygiene independent   Dress independent   Room Organization independent

## 2018-06-06 NOTE — PLAN OF CARE
"Problem: Mood Impairment (Depressive Signs/Symptoms) (Adult)  Goal: Improved Mood Symptoms (Depressive Signs/Symptoms)    Pt has been isolative in his room and coming out only for meals and medication.  Pt reports being frustrated with his length of stay in the hospital.  Pt reports \"this place is not helping me, it makes me worse.\"  Affect is flat.  Mood is tensed, otherwise no behavioral concerns.  Will continue to assess pt.        "

## 2018-06-06 NOTE — PROGRESS NOTES
[facility], [place]   Psychiatric Progress Note        Interim History:     The patient's care was discussed with the treatment team during the daily team meeting and/or staff's chart notes were reviewed.      Reason for Admission: Patient has a history of schizoaffective disorder vs Bipolar affective disorder and stimulant use disorder. Was admitted by his CM because of disorganized behavior and violating conditions of PD. PD was subsequently revoked.     Interim: Overall, the patient seems to be passing time on the unit mostly in his room and out for meals. He future oriented, and was glad that we were looking into programs that will be funded by insurance. Felt anxious about where he would be placed in the future. Denies any current complaints. Denied SI/HI/AH/VH.          Medications:       cholecalciferol  2,000 Units Oral Daily     cyanocobalamin  1,000 mcg Oral Daily     divalproex sodium extended-release  2,000 mg Oral At Bedtime     lisinopril  20 mg Oral Daily     mirtazapine  15 mg Oral At Bedtime     nicotine  1 patch Transdermal Daily     nicotine   Transdermal Q8H     nicotine   Transdermal Daily     OLANZapine  5 mg Oral Daily     OLANZapine  5 mg Oral At Bedtime     paliperidone  156 mg Intramuscular Q28 Days          Allergies:   No Known Allergies       Labs:     No results found for this or any previous visit (from the past 24 hour(s)).       Psychiatric Examination:     BP (!) 148/93 (BP Location: Right arm)  Pulse 94  Temp 98.6  F (37  C) (Oral)  Resp 17  Ht 1.829 m (6')  Wt 113.4 kg (250 lb)  SpO2 97%  BMI 33.91 kg/m2  Weight is 250 lbs 0 oz  Body mass index is 33.91 kg/(m^2).                                  Sitting Orthostatic BP: 141/106         Standing Orthostatic BP: 142/100      Appearance: awake, alert and dressed in hospital scrubs  Attitude: more  cooperative  Eye Contact:  fair  Mood:  anxious  Affect:  constricted mobility  Speech:  clear,  coherent  Psychomotor Behavior:  no evidence of tardive dyskinesia, dystonia, or tics  Throught Process:  logical and linear  Associations:  no loose associations  Thought Content:  no evidence of suicidal ideation or homicidal ideation and no evidence of openly psychotic thoughts? it is possible, though, that patient has some paranoia.   Insight: partial  Judgement:  fair  Oriented to:  time, person, and place  Attention Span and Concentration:  fair  Recent and Remote Memory:  fair    Clinical Global Impressions  First:  Considering your total clinical experience with this particular patient population, how severe are the patient's symptoms at this time?: 6 (05/03/18 1825)  Compared to the patient's condition at the START of treatment, this patient's condition is:: 4 (05/03/18 1825)  Most recent:  Considering your total clinical experience with this particular patient population, how severe are the patient's symptoms at this time?: 4 (5/17/2018 )  Compared to the patient's condition at the START of treatment, this patient's condition is: 3 (5/17/2018 )    # Pain Assessment:  Current Pain Score 6/2/2018   Patient currently in pain? denies   Pain score (0-10) -   Pain location -   Pain descriptors -   Nabor s pain level was assessed, he denies pain today.             Precautions:     Behavioral Orders   Procedures     Code 1 - Restrict to Unit     Routine Programming     As clinically indicated     Status 15     Every 15 minutes.     Suicide precautions     Patients on Suicide Precautions should have a Combination Diet ordered that includes a Diet selection(s) AND a Behavioral Tray selection for Safe Tray - with utensils, or Safe Tray - NO utensils            DIagnoses:     Bipolar affective disorder, mixed, history of diagnosis of schizoaffective disorder and schizophrenia as well, history of cannabis use disorder, rule out stimulant use disorder.          Plan:   Patient overall has been the same as before. He  seems rather board with being hospitalized, and denied any imminent issues thus far. He is overall future oriented and looking forward with discharging. He seems to be sleeping in his room for most of the day, although comes out of his room for morning and evening community meeting and for meals, thus it seems he picks and chooses which activity to participate in. Generally speaking, he may be at baseline regarding his behavior. Added 5 mg Zyprexa daytime to help attenuate daytime ruminative racing thoughts and anxiety.     Medications:  - Depakote 2000 mg HS  - Remeron 15 mg HS  - Zyprexa 5 mg AM and 5 mg HS   - Invega Susstenna  mg (next dose 6/15).     Legal: Committed     Dispo: Awaiting placement. Awaiting        Jamaal Golden MD  [facility]

## 2018-06-07 PROCEDURE — 25000132 ZZH RX MED GY IP 250 OP 250 PS 637: Performed by: PSYCHIATRY & NEUROLOGY

## 2018-06-07 PROCEDURE — 99232 SBSQ HOSP IP/OBS MODERATE 35: CPT | Performed by: PSYCHIATRY & NEUROLOGY

## 2018-06-07 PROCEDURE — 12400007 ZZH R&B MH INTERMEDIATE UMMC

## 2018-06-07 RX ADMIN — NICOTINE 1 PATCH: 21 PATCH, EXTENDED RELEASE TRANSDERMAL at 21:40

## 2018-06-07 RX ADMIN — OLANZAPINE 10 MG: 10 TABLET, FILM COATED ORAL at 14:01

## 2018-06-07 RX ADMIN — IBUPROFEN 600 MG: 600 TABLET ORAL at 14:01

## 2018-06-07 RX ADMIN — MIRTAZAPINE 15 MG: 15 TABLET, FILM COATED ORAL at 20:29

## 2018-06-07 RX ADMIN — LISINOPRIL 20 MG: 20 TABLET ORAL at 11:12

## 2018-06-07 RX ADMIN — VITAMIN D, TAB 1000IU (100/BT) 2000 UNITS: 25 TAB at 11:12

## 2018-06-07 RX ADMIN — DIVALPROEX SODIUM 2000 MG: 500 TABLET, EXTENDED RELEASE ORAL at 20:29

## 2018-06-07 RX ADMIN — CYANOCOBALAMIN TAB 1000 MCG 1000 MCG: 1000 TAB at 11:12

## 2018-06-07 RX ADMIN — OLANZAPINE 5 MG: 5 TABLET, FILM COATED ORAL at 20:29

## 2018-06-07 RX ADMIN — OLANZAPINE 5 MG: 5 TABLET, FILM COATED ORAL at 11:12

## 2018-06-07 ASSESSMENT — ACTIVITIES OF DAILY LIVING (ADL)
GROOMING: INDEPENDENT
DRESS: INDEPENDENT
ORAL_HYGIENE: INDEPENDENT

## 2018-06-07 NOTE — PLAN OF CARE
Problem: Mood Impairment (Depressive Signs/Symptoms) (Adult)  Goal: Improved Mood Symptoms (Depressive Signs/Symptoms)  Outcome: No Change  Patient calm and cooperative this shift.  Flat affect, denies SI/SIB, hallucinations.  Good appetite at supper and snack time.  Took scheduled medications with no problem.  Received prn ibuprofen per request for head ache.  No aggressive behavior noted so far, will continue to monitor closely.

## 2018-06-07 NOTE — PROGRESS NOTES
"   06/07/18 1332   Behavioral Health   Hallucinations denies / not responding to hallucinations   Thinking poor concentration;distractable   Orientation person: oriented;place: oriented;date: oriented   Memory baseline memory   Insight poor   Judgement impaired   Affect irritable   Mood irritable   Physical Appearance/Attire attire appropriate to age and situation   Hygiene well groomed   Suicidality other (see comments)  (Refused Check In)   1. Wish to be Dead (Refused Check In)   Wish to be Dead Description (Refused Check In)   2. Non-Specific Active Suicidal Thoughts  (Refused Check In)   Non-Specific Active Suicidal Thought Description (Refused Check In)   Self Injury other (see comment)  (Refused Check In)   Elopement Statements about wanting to leave   Activity isolative;withdrawn   Speech clear;coherent   Medication Sensitivity no observed side effects   Psychomotor / Gait balanced;steady   Psycho Education   Type of Intervention 1:1 intervention   Response refuses   Treatment Detail (Refused Check In)   Activities of Daily Living   Hygiene/Grooming independent   Oral Hygiene independent   Dress independent   Room Organization independent   Patient was in his room sleeping most fo the shift, only coming out for both meals this shift.  The patient was not social with peers while out.  The patient was irritable about not being discharged yet.  The patient was muttering under his breath about this while out of his room.  The patient refused a check in during this shift.  The patient stated that he was \"fine\" and just ready to go, adding that he was angry it was taking so long.  "

## 2018-06-07 NOTE — PROGRESS NOTES
"[facility], [place]   Psychiatric Progress Note        Interim History:     The patient's care was discussed with the treatment team during the daily team meeting and/or staff's chart notes were reviewed.      Reason for Admission: Patient has a history of schizoaffective disorder vs Bipolar affective disorder and stimulant use disorder. Was admitted by his CM because of disorganized behavior and violating conditions of PD. PD was subsequently revoked.     Interim: Overall, the patient seems to be passing time on the unit mostly in his room and out for meals. He is very irritable, though, that his discharge takes such a long time and yesterday got agitated (after being delivered bad news that he would have to cancel his insurance to get MA because of high copay) that broke a chair. Was also reported making threats to staff.     [facility]: \"Pt was given more bad news about his insurance policy and obtaining funding for treatment. His co-pay would be in the thousands of dollars. Upon hearing this information, pt became agitated, aggressive  breaking two pieces of unit furniture and demanding to leave. A code 21 was called.     Pt will need to cancel his insurance in order to get MA. Pt's sister who is also his employer wants pt to receive treatment and stabilize prior to returning to work. Pt does not know this information as he was already agitated. Writer is concerned about delivering this news to pt.     Writer called Dr. Mckeon to consult about transfer to station 12 after pt threatened to destroy the unit and hurt people. Dr. Mckeon wants to wait to see if pt will settle down. This was pt's first code since hospitalized. If pt continues to threaten people Dr. Mckeon will transfer pt to station 12. \"         Medications:       cholecalciferol  2,000 Units Oral Daily     cyanocobalamin  1,000 mcg Oral Daily     divalproex sodium extended-release  2,000 mg Oral At Bedtime     " lisinopril  20 mg Oral Daily     mirtazapine  15 mg Oral At Bedtime     nicotine  1 patch Transdermal Daily     nicotine   Transdermal Q8H     nicotine   Transdermal Daily     OLANZapine  5 mg Oral At Bedtime     [START ON 6/8/2018] OLANZapine  7.5 mg Oral Daily     paliperidone  156 mg Intramuscular Q28 Days          Allergies:   No Known Allergies       Labs:     No results found for this or any previous visit (from the past 24 hour(s)).       Psychiatric Examination:     /85  Pulse 110  Temp 97.5  F (36.4  C) (Oral)  Resp 18  Ht 1.829 m (6')  Wt 113.4 kg (250 lb)  SpO2 97%  BMI 33.91 kg/m2  Weight is 250 lbs 0 oz  Body mass index is 33.91 kg/(m^2).                                 Lying Orthostatic BP: 149/79         Sitting Orthostatic BP: 165/114         Standing Orthostatic BP: 185/113      Appearance: somnolent, alert and dressed in hospital scrubs  Attitude: less cooperative  Eye Contact:  fair  Mood:  anxious  Affect:  constricted mobility  Speech:  clear, coherent  Psychomotor Behavior:  no evidence of tardive dyskinesia, dystonia, or tics  Throught Process:  logical and linear  Associations:  no loose associations  Thought Content:  no evidence of suicidal ideation or homicidal ideation and no evidence of openly psychotic thoughts? it is possible, though, that patient has some paranoia.   Insight: partial  Judgement: limited  Oriented to:  time, person, and place  Attention Span and Concentration:  fair  Recent and Remote Memory:  fair    Clinical Global Impressions  First:  Considering your total clinical experience with this particular patient population, how severe are the patient's symptoms at this time?: 6 (05/03/18 1825)  Compared to the patient's condition at the START of treatment, this patient's condition is:: 4 (05/03/18 1825)  Most recent:  Considering your total clinical experience with this particular patient population, how severe are the patient's symptoms at this time?: 4  (5/17/2018 )  Compared to the patient's condition at the START of treatment, this patient's condition is: 3 (5/17/2018 )    # Pain Assessment:  Current Pain Score 6/2/2018   Patient currently in pain? denies   Pain score (0-10) -   Pain location -   Pain descriptors -   Nabor lockett pain level was assessed, he denies pain today.             Precautions:     Behavioral Orders   Procedures     Code 1 - Restrict to Unit     Routine Programming     As clinically indicated     Status 15     Every 15 minutes.     Suicide precautions     Patients on Suicide Precautions should have a Combination Diet ordered that includes a Diet selection(s) AND a Behavioral Tray selection for Safe Tray - with utensils, or Safe Tray - NO utensils            DIagnoses:     Bipolar affective disorder, mixed, history of diagnosis of schizoaffective disorder and schizophrenia as well, history of cannabis use disorder, rule out stimulant use disorder.          Plan:   Patient overall has been the same as before. He seems rather bored with being hospitalized, and denied any imminent issues thus far. He is overall future oriented and looking forward with discharging. He seems to be sleeping in his room for most of the day, although comes out of his room for morning and evening community meeting and for meals, thus it seems he picks and chooses which activity to participate in. Generally speaking, he may be at baseline regarding his behavior. His mood lability can present a major problem for for placement. Will increase daily Zyprexa to 7.5 mg. Will recheck Valproic Acid level in am.    Medications:  - Depakote 2000 mg HS  - Remeron 15 mg HS  - Zyprexa 7.5 mg AM and 5 mg HS   - Invega Susstenna  mg (next dose 6/15).     Legal: Committed     Dispo: Awaiting placement. Awaiting        Vasu Chatterjee MD

## 2018-06-08 LAB — VALPROATE SERPL-MCNC: 117 MG/L (ref 50–100)

## 2018-06-08 PROCEDURE — 12400007 ZZH R&B MH INTERMEDIATE UMMC

## 2018-06-08 PROCEDURE — 80164 ASSAY DIPROPYLACETIC ACD TOT: CPT | Performed by: PSYCHIATRY & NEUROLOGY

## 2018-06-08 PROCEDURE — 36415 COLL VENOUS BLD VENIPUNCTURE: CPT | Performed by: PSYCHIATRY & NEUROLOGY

## 2018-06-08 PROCEDURE — 25000132 ZZH RX MED GY IP 250 OP 250 PS 637: Performed by: PSYCHIATRY & NEUROLOGY

## 2018-06-08 PROCEDURE — 99232 SBSQ HOSP IP/OBS MODERATE 35: CPT | Performed by: PSYCHIATRY & NEUROLOGY

## 2018-06-08 RX ADMIN — DIVALPROEX SODIUM 1750 MG: 250 TABLET, FILM COATED, EXTENDED RELEASE ORAL at 21:56

## 2018-06-08 RX ADMIN — LISINOPRIL 20 MG: 20 TABLET ORAL at 08:51

## 2018-06-08 RX ADMIN — VITAMIN D, TAB 1000IU (100/BT) 2000 UNITS: 25 TAB at 08:51

## 2018-06-08 RX ADMIN — OLANZAPINE 5 MG: 5 TABLET, FILM COATED ORAL at 21:57

## 2018-06-08 RX ADMIN — MIRTAZAPINE 15 MG: 15 TABLET, FILM COATED ORAL at 21:56

## 2018-06-08 RX ADMIN — OLANZAPINE 7.5 MG: 5 TABLET, FILM COATED ORAL at 08:51

## 2018-06-08 RX ADMIN — NICOTINE 1 PATCH: 21 PATCH, EXTENDED RELEASE TRANSDERMAL at 21:57

## 2018-06-08 RX ADMIN — CYANOCOBALAMIN TAB 1000 MCG 1000 MCG: 1000 TAB at 08:51

## 2018-06-08 ASSESSMENT — ACTIVITIES OF DAILY LIVING (ADL)
DRESS: STREET CLOTHES;INDEPENDENT
GROOMING: INDEPENDENT
ORAL_HYGIENE: INDEPENDENT
ORAL_HYGIENE: INDEPENDENT
DRESS: STREET CLOTHES;INDEPENDENT
HYGIENE/GROOMING: HANDWASHING;SHOWER;INDEPENDENT
LAUNDRY: UNABLE TO COMPLETE
LAUNDRY: UNABLE TO COMPLETE

## 2018-06-08 NOTE — PLAN OF CARE
Problem: Social/Occupational/Functional Impairment (Depressive Signs/Symptoms) (Adult)  Goal: Improved Social/Occupational/Functional Skills (Depressive Signs/Symptoms)  Outcome: No Change  48 Hour Nursing Assessment:  Day 35 of admission.  Nabor was out of his room for breakfast and then went back to bed.  When this writer approached him, he woke up long enough to say he wasn't suicidal and then went back to snoring.  He has been taking his medication and has not acted out since 6-6-18.  He is not attending groups.

## 2018-06-08 NOTE — PROGRESS NOTES
[facility], [place]   Psychiatric Progress Note        Interim History:     The patient's care was discussed with the treatment team during the daily team meeting and/or staff's chart notes were reviewed.      Reason for Admission: Patient has a history of schizoaffective disorder vs Bipolar affective disorder and stimulant use disorder. Was admitted by his CM because of disorganized behavior and violating conditions of PD. PD was subsequently revoked.     Interim: spends a lot of time in his bed. Gets out only to eat and immediately after that returns to his bed. Disheveled, needs to be reminded to take shower. Admits to feeling very bored with being here. No anger outbursts since 6/6. Valproic Acid level today was 117.         Medications:       cholecalciferol  2,000 Units Oral Daily     cyanocobalamin  1,000 mcg Oral Daily     divalproex sodium extended-release  1750 mg Oral At Bedtime     lisinopril  20 mg Oral Daily     mirtazapine  15 mg Oral At Bedtime     nicotine  1 patch Transdermal Daily     nicotine   Transdermal Q8H     nicotine   Transdermal Daily     OLANZapine  5 mg Oral At Bedtime     OLANZapine  7.5 mg Oral Daily     paliperidone  156 mg Intramuscular Q28 Days          Allergies:   No Known Allergies       Labs:     Recent Results (from the past 24 hour(s))   Valproic acid    Collection Time: 06/08/18  7:44 AM   Result Value Ref Range    Valproic Acid Level 117 (H) 50 - 100 mg/L          Psychiatric Examination:     /85  Pulse 110  Temp 97.6  F (36.4  C) (Oral)  Resp 16  Ht 1.829 m (6')  Wt 113.4 kg (250 lb)  SpO2 97%  BMI 33.91 kg/m2  Weight is 250 lbs 0 oz  Body mass index is 33.91 kg/(m^2).                                 Lying Orthostatic BP: 149/79         Sitting Orthostatic BP: 154/118         Standing Orthostatic BP: 133/106      Appearance: somnolent, alert and dressed in hospital scrubs  Attitude: less cooperative  Eye Contact:  fair  Mood:   calm  Affect:  constricted mobility  Speech:  clear, coherent  Psychomotor Behavior:  no evidence of tardive dyskinesia, dystonia, or tics  Throught Process:  logical and linear  Associations:  no loose associations  Thought Content:  no evidence of suicidal ideation or homicidal ideation and no evidence of openly psychotic thoughts? it is possible, though, that patient has some paranoia.   Insight: partial  Judgement: limited  Oriented to:  time, person, and place  Attention Span and Concentration:  fair  Recent and Remote Memory:  fair    Clinical Global Impressions  First:  Considering your total clinical experience with this particular patient population, how severe are the patient's symptoms at this time?: 6 (05/03/18 1825)  Compared to the patient's condition at the START of treatment, this patient's condition is: 4 (05/03/18 1825)  Most recent:  Considering your total clinical experience with this particular patient population, how severe are the patient's symptoms at this time?: 4 6/8/2018 )  Compared to the patient's condition at the START of treatment, this patient's condition is: 2 (6/8/2018 )    # Pain Assessment:  Current Pain Score 6/8/2018   Patient currently in pain? denies   Pain score (0-10) -   Pain location -   Pain descriptors -   Nabor s pain level was assessed, he denies pain today.             Precautions:     Behavioral Orders   Procedures     Code 1 - Restrict to Unit     Routine Programming     As clinically indicated     Status 15     Every 15 minutes.     Suicide precautions     Patients on Suicide Precautions should have a Combination Diet ordered that includes a Diet selection(s) AND a Behavioral Tray selection for Safe Tray - with utensils, or Safe Tray - NO utensils            DIagnoses:     Bipolar affective disorder, mixed, history of diagnosis of schizoaffective disorder and schizophrenia as well, history of cannabis use disorder, rule out stimulant use disorder.          Plan:    Patient overall has been the same as before. He seems rather bored with being hospitalized, and denied any imminent issues thus far. He is overall future oriented and looking forward with discharging. He seems to be sleeping in his room for most of the day, although comes out of his room for morning and evening community meeting and for meals, thus it seems he picks and chooses which activity to participate in. Generally speaking, he may be at baseline regarding his behavior. His mood lability can present a major problem for for placement. Will decrease Depakote dose to 1750 mg qhs because of elevated 117) Valproic Acid level.    Medications:  - Depakote 1750 mg HS  - Remeron 15 mg HS  - Zyprexa 7.5 mg AM and 5 mg HS   - Invega Susstenna  mg (next dose 6/15).     Legal: Committed     Dispo: Awaiting placement. Awaiting        Vasu Chatterjee MD

## 2018-06-09 PROCEDURE — 25000132 ZZH RX MED GY IP 250 OP 250 PS 637: Performed by: PSYCHIATRY & NEUROLOGY

## 2018-06-09 PROCEDURE — 12400007 ZZH R&B MH INTERMEDIATE UMMC

## 2018-06-09 RX ADMIN — DIVALPROEX SODIUM 1750 MG: 250 TABLET, FILM COATED, EXTENDED RELEASE ORAL at 21:43

## 2018-06-09 RX ADMIN — NICOTINE 1 PATCH: 21 PATCH, EXTENDED RELEASE TRANSDERMAL at 21:43

## 2018-06-09 RX ADMIN — OLANZAPINE 5 MG: 5 TABLET, FILM COATED ORAL at 21:44

## 2018-06-09 RX ADMIN — MIRTAZAPINE 15 MG: 15 TABLET, FILM COATED ORAL at 21:43

## 2018-06-09 ASSESSMENT — ACTIVITIES OF DAILY LIVING (ADL)
LAUNDRY: WITH SUPERVISION
HYGIENE/GROOMING: INDEPENDENT
ORAL_HYGIENE: INDEPENDENT
DRESS: INDEPENDENT

## 2018-06-09 NOTE — PROGRESS NOTES
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 that? What was helpful? What happened to prevent you from continuing your sobriety? (DSM)     Client indicates he has tried to limit his use to weekends only but this strategy has not worked.     2. Have you experienced cravings? If yes, ask follow up questions to determine if the person recognizes triggers and if the person has had any success in dealing with them.     Client does not experience cravings very often.  He does not appear to recognize triggers.    3. Have you been treated for alcohol/other drug abuse/dependence?     Yes.  3B. Number of times(lifetime) (over what period) 7.  3C. Number of times completed treatment (lifetime) 6.  3D. During the past three years have you participated in outpatient and/or residential?  No    4. Support group participation: Have you/do you attend support group meetings to reduce/stop your alcohol/drug use? How recently? What was your experience? Are you willing to restart? If the person has not participated, is he or she " "willing?     Client indicates the he has been active with AA and plans to return to meetings and engage with a sponsor.    5. What would assist you in staying sober/straight?     \"Having daily structure and going to AA meetings\"    Dimension V Ratings   Relapse/Continued Use/Continued problem potential - The placing authority must use the criteria in Dimension V to determine a client s relapse, continued use, and continued problem potential.   RISK DESCRIPTIONS - Severity rating: 3 Client has poor recognition and understanding of relapse and recidivism issues and displays moderately high vulnerability for further substance use or mental health problems. Client has few coping skills and rarely applies coping skills.    REASONS SEVERITY WAS ASSIGNED - (What information did the person provide that indicates his or her understanding of relapse issues? What about the person s experience indicates how prone he or she is to relapse? What coping skills does the person have that decrease relapse potential?)      Patient reports having been involved in 7 past treatments (completed 6), 0 past detox admissions, and active past 12-Step support group participation. Pt reports having some sober time (3 years) and has tried to quit using and drinking in the past but relapsed. Pt lacks insight into his personal relapse process along with early warning signs and triggers. Pt lacks impulse control, sober coping skills and long-term sober maintenance skills. Pt lacks insight into the effects his use has had on his physical and mental health. Pt is at a high risk for relapse/continued use.       DIMENSION VI - Recovery Environment   1. Are you employed/attending school? Tell me about that.     Client indicates he is not employed or attending school.    2A. Describe a typical day; evening for you. Work, school, social, leisure, volunteer, spiritual practices. Include time spent obtaining, using, recovering from drugs or alcohol. (DSM) " "    When client was working he would limit his use to after work and evenings.  He has been hospitalized since he lost his job so cannot describe a typical day at this point in time.      2B. How often do you spend more time than you planned using or use more than you planned? (DSM)     \"Almost all the time\"    3. How important is using to your social connections? Do many of your family or friends use?     Client indicates that most of his friends use but not his family.      4A. Are you currently in a significant relationship?     No    4C. Sexual Orientation:     Heterosexual    5A. Who do you live with?      Client had been living alone and he is now homeless    5B. Tell me about their alcohol/drug use and mental health issues.     NA    5C. Are you concerned for your safety there? N/A    5D. Are you concerned about the safety of anyone else who lives with you? N/A    6A. Do you have children who live with you?     No    6B. Do you have children who do not live with you?     No    7A. Who supports you in making changes in your alcohol or drug use? What are they willing to do to support you? Who is upset or angry about you making changes in your alcohol or drug use? How big a problem is this for you?      Client indicates his sisters are in support of him making changes to drug/alcohol use.  Some friends may be upset and client indicates he plans to stay away from friends that use and to use his sober network more.    7B. This table is provided to record information about the person s relationships and available support It is not necessary to ask each item; only to get a comprehensive picture of their support system.  How often can you count on the following people when you need someone?   Partner / Spouse N/A   Parent(s)/Aunt(s)/Uncle(s)/Grandparents N/A   Sibling(s)/Cousin(s) Always supportive   Child(cleo) N/A   Other relative(s) N/A   Friend(s)/neighbor(s) Rarely supportive   Child(cleo) s father(s)/mother(s) " N/A   Support group member(s) Always supportive   Community of [place] members Always supportive   /counselor/therapist/healer Always supportive   Other (specify) N/A     8A. What is your current living situation?     Homeless    8B. What is your long term plan for where you will be living?     Sober living after treatment.    8C. Tell me about your living environment/neighborhood? Ask enough follow up questions to determine safety, criminal activity, availability of alcohol and drugs, supportive or antagonistic to the person making changes.      Client is homeless.    9. Criminal justice history: Gather current/recent history and any significant history related to substance use--Arrests? Convictions? Circumstances? Alcohol or drug involvement? Sentences? Still on probation or parole? Expectations of the court? Current court order? Any sex offenses - lifetime? What level? (DSM)    Client has a remote history of felony charges and group home time, one year with work release in .  He is currently on probation until 3/30/2020 for this.  (30 years probation).  Current civil commitment.    10. What obstacles exist to participating in treatment? (Time off work, childcare, funding, transportation, pending group home time, living situation)     None    Dimension VI Ratings   Recovery environment - The placing authority must use the criteria in Dimension VI to determine a client s recovery environment.   RISK DESCRIPTIONS - Severity ratin Client has (A) Chronically antagonistic significant other, living environment, family, peer group or long-term criminal justice involvement that is harmful to recovery or treatment progress, or (B) Client has an actively antagonistic significant other, family, work, or living environment with immediate threat to the client s safety and well-being.    REASONS SEVERITY WAS ASSIGNED - (What support does the person have for making changes? What structure/stability does the person have in  his or her daily life that will increase the likelihood that changes can be sustained? What problems exist in the person s environment that will jeopardize getting/staying clean and sober?)     Patient reports that his current living situation is unsupportive towards his recovery. Pt reports that he is currently homeless. Pt reports that he lacks a daily structure and meaningful activities. Pt reports that he is currently unemployed and is not attending school at this time. Pt reports fracturing relationships with family and friends due to his use. Pt lacks a sober support network. Pt reports that he has some legal involvement for drug possession and selling and is on probation for it. He is also under civil commitment at this time.         Client Choice/Exceptions   Would you like services specific to language, age, gender, culture, Tenriism preference, race, ethnicity, sexual orientation or disability?  No    What particular treatment choices and options would you like to have? Residential MI/CD treatment    Do you have a preference for a particular treatment program? [facility]    Criteria for Diagnosis     Criteria for Diagnosis  DSM-5 Criteria for Substance Use Disorder  Instructions: Determine whether the client currently meets the criteria for Substance Use Disorder using the diagnostic criteria in the DSM-V pp.481-58. Current means during the most recent 12 months outside a facility that controls access to substances    Category of Substance Severity (ICD-10 Code / DSM 5 Code)     Alcohol Use Disorder Moderate  (F10.20) (303.90)   Cannabis Use Disorder Severe   (F12.20) (304.30)   Hallucinogen Use Disorder NA   Inhalant Use Disorder NA   Opioid Use Disorder NA   Sedative, Hypnotic, or Anxiolytic Use Disorder NA   Stimulant Related Disorder NA   Tobacco Use Disorder Moderate   (F17.200) (305.1)   Other (or unknown) Substance Use Disorder NA       Collateral Contact Summary   Number of contacts made:  "1    Contact with referring person:  NA.    If court related records were reviewed, summarize here: NA    Information from collateral contacts supported/largely agreed with information from the client and associated risk ratings.      Rule 25 Assessment Summary and Plan   's Recommendation    1)  Complete a residential based or similar MI/CD treatment program   2)  Abstain from all mood-altering chemicals unless prescribed by a licensed provider.   3)  Attend weekly 12-step support group meetings.     4)  Actively work with a male sponsor or  through [facility] ([phone]).   5)  Follow all the recommendations of your treatment/medical providers.  6)  Remain law abiding and follow all recommendations of the Courts/PO.    Collateral Contacts     Name:    Vasu Chatterjee   Relationship:    [facility] Attending Psychiatrist   Phone Number:    [phone] Releases:         Admitted:     05/02/2018       The patient was seen for 70 minutes on 5/3/2018.  Greater than 50% of the time was spent in counseling, coordinating care, clarifying diagnostic prognostic issues, presence of support in community.       CHIEF COMPLAINT AND REASON FOR ADMISSION:  The patient is a 48-year-old  male who was transported to [facility] Emergency [facility] by his sister due to suicidal ideation.       HISTORY OF PRESENT ILLNESS:  The patient is a very poor Historian.  In fact was pretty irritated, especially while talking about his past mental health history.  Frequent for him and his collateral sources, he has a history of bipolar affective disorder and schizoaffective disorder.  The patient describes this as \"I was f...ed up by the system my whole life.\"  Mostly managed his symptoms through [facility] while he was hospitalized and where he was receiving monthly Invega injections as well as Remeron and Depakote.  However, he has recently changed insurance and said that [facility] is no longer in " "network.  He presented today voluntarily with suicidal ideations, says that \"just cannot take it anymore and that people are always taking from me and robbing me.   He said that he has friends, family who he states they can steal from him often.  He is currently in the transition to moving to a new place after being asked to leave his current living situation.  The patient is currently under 6 months commitment through [facility].  According to the patient's sister who talked to DEC, says the patient was talking about taking him to a bridge so he could jump off.  He denied presence of suicidal thoughts before.  Apparently the main stress is that he got kicked out of his apartment for allowing someone else to live with him.      According to sister, the patient uses drugs and alcohol to make himself feel better.  His urine drug screen was positive for THC and amphetamines.  He admitted that he took someone else's Adderall.  He has this intensity that he was compliant with his medication and fact, his valproic acid level was within therapeutic range.       PAST PSYCHIATRIC HISTORY:  As above.  He reports that he sees Dr. Torrez for medication management.  Reports a number of psychiatric hospitalizations, most recent was at [facility] [place].  He is under commitment and has  through [facility].       FAMILY AND SOCIAL HISTORY:  The patient is single, never been , no children.  He reports working, but will not go into details about where he works.  Said that he is not on Social Security Disability income.  Has a history of his older brother who ended his life with suicide and had addiction and psychotic symptoms.  The patient said that he has his sisters.  It sounded like he is okay with 1 of them and has difficult relationship with another one.       PAST MEDICAL HISTORY:  Hypertension.       ALLERGIES:   HE DENIED ANY KNOWN MEDICAL ALLERGIES.         PAST SURGICAL " HISTORY:  There is no pertinent surgical history.       HOME MEDICATIONS:     1.  Depakote 2000 mg at bedtime.   2.  Remeron 15 mg.   3.  Lisinopril 20 mg daily.   4.  Melatonin 3 mg at bedtime p.r.n. for sleep.     5.  Invega Sustenna 156 mg intramuscularly every 28 days, next dose is 5/11.     6.  Vitamin B12 1000 mcg daily.   7.  Cholecalciferol take 2000 units by mouth daily.       PHYSICAL EXAMINATION/12 POINT REVIEW OF SYSTEMS:  Please refer to Dr. Kaushik Baxter's note from 5/2/2018.  I reviewed this note and agree with it.       VITAL SIGNS:  Temperature 97.4, blood pressure 144/92, heart rate 106.       MENTAL STATUS EXAMINATION:  The patient is a disheveled  male, who was interviewed while lying in bed.  He was pretty irritable, see above.  Reported that everyone was robbing and stealing from him.  He did not disclose any psychotic symptoms.  Reported passive suicidal ideation, but said that he felt safe being in the hospital.  He sounded pretty bitter and angry while talking about his previous psychiatric providers, but did tell me that he was compliant with his medications.  When asked if he felt safe in the hospital, he said yes, and even now was so insightful that I asked if he could be prescribed some medication to calm him down.  I told him that Zyprexa was available for him and he went out of his bed and took it.  Patient's ability to focus and concentrate were both impaired.  Insight was partial.  Judgment moderately impaired.  He denied presence of active homicidal thoughts.  Was alert, oriented x 3.  Fund of knowledge appeared to be low average.       IMPRESSION:  Bipolar affective disorder, mixed, history of diagnosis of schizoaffective disorder and schizophrenia as well, history of cannabis use disorder, rule out stimulant use disorder.       TREATMENT PLAN:  The patient is restarted on his home medications.  I will try to clarify whether some of his symptoms are due to ongoing  psychosis, not just a combination of depressive symptoms and difficult life circumstances.  He would clearly benefit from getting chemical dependency treatment help because of his polysubstance use.           CHARITY PEARCE MD            Collateral Contacts     Name    [facility]'s EMR   Relationship     Phone Number     Releases           Information Provided:  This writer reviewed this patients Electronic Medical Record and the information reviewed largely supports the information the patient reported during their CD evaluation.        Collateral Contacts     Name:       Relationship:       Phone Number:       Releases:             ollateral Contacts      A problematic pattern of alcohol/drug use leading to clinically significant impairment or distress, as manifested by at least two of the following, occurring within a 12-month period:    Alcohol/drug is often taken in larger amounts or over a longer period than was intended.  There is a persistent desire or unsuccessful efforts to cut down or control alcohol/drug use  A great deal of time is spent in activities necessary to obtain alcohol, use alcohol, or recover from its effects.  Craving, or a strong desire or urge to use alcohol/drug  Recurrent alcohol/drug use resulting in a failure to fulfill major role obligations at work, school or home.  Continued alcohol use despite having persistent or recurrent social or interpersonal problems caused or exacerbated by the effects of alcohol/drug.  Important social, occupational, or recreational activities are given up or reduced because of alcohol/drug use.  Recurrent alcohol/drug use in situations in which it is physically hazardous.  Alcohol/drug use is continued despite knowledge of having a persistent or recurrent physical or psychological problem that is likely to have been caused or exacerbated by alcohol.  Tolerance, as defined by either of the following: A need for markedly increased amounts of  alcohol/drug to achieve intoxication or desired effect. and A markedly diminished effect with continued use of the same amount of alcohol/drug.  Withdrawal, as manifested by either of the following: The characteristic withdrawal syndrome for alcohol/drug (refer to Criteria A and B of the criteria set for alcohol/drug withdrawal). and Alcohol/drug (or a closely related substance, such as a benzodiazepine) is taken to relieve or avoid withdrawal symptoms.      Specify if: In early remission:  After full criteria for alcohol/drug use disorder were previously met, none of the criteria for alcohol/drug use disorder have been met for at least 3 months but for less than 12 months (with the exception that Criterion A4,  Craving or a strong desire or urge to use alcohol/drug  may be met).     In sustained remission:   After full criteria for alcohol use disorder were previously met, non of the criteria for alcohol/drug use disorder have been met at any time during a period of 12 months or longer (with the exception that Criterion A4,  Craving or strong desire or urge to use alcohol/drug  may be met).   Specify if:   This additional specifier is used if the individual is in an environment where access to alcohol is restricted.    Mild: Presence of 2-3 symptoms    Moderate: Presence of 4-5 symptoms    Severe: Presence of 6 or more symptoms

## 2018-06-09 NOTE — PROGRESS NOTES
06/09/18 1503   Behavioral Health   Hallucinations denies / not responding to hallucinations   Thinking distractable   Orientation person: oriented;place: oriented;date: oriented;time: oriented   Memory baseline memory   Insight insight appropriate to situation   Judgement impaired   Eye Contact at examiner   Affect full range affect   Mood mood is calm   Physical Appearance/Attire attire appropriate to age and situation   Hygiene well groomed   Suicidality other (see comments)  (Denied)   1. Wish to be Dead No   2. Non-Specific Active Suicidal Thoughts  No   Self Injury other (see comment)  (Denied)   Elopement Statements about wanting to leave   Activity isolative;withdrawn   Speech coherent;clear   Medication Sensitivity no observed side effects   Psychomotor / Gait balanced;steady   Psycho Education   Type of Intervention 1:1 intervention   Response unavailable   Hours 0.5   Treatment Detail Check-In   Activities of Daily Living   Hygiene/Grooming independent   Oral Hygiene independent   Dress independent   Laundry with supervision   Room Organization independent     Pt was sleeping in his room for the majority of the shift. Pt only came out of his room for lunch and went right back to it after he finished eating.

## 2018-06-09 NOTE — PROGRESS NOTES
06/08/18 8747   Behavioral Health   Hallucinations denies / not responding to hallucinations   Thinking distractable;poor concentration   Orientation person: oriented;place: oriented;date: oriented;time: oriented   Memory baseline memory   Insight poor   Judgement impaired   Eye Contact at examiner   Affect full range affect   Mood mood is calm   Physical Appearance/Attire disheveled   Hygiene neglected grooming - unclean body, hair, teeth   Suicidality other (see comments)  (Denies)   1. Wish to be Dead No   2. Non-Specific Active Suicidal Thoughts  No   3. Active Sucidal Ideation with any Methods (Not Plan) Without Intent to Act  No   4. Active Suicidal Ideation with Some Intent to Act, Without Specific Plan  No   5. Active Suicidal Ideation with Specific Plan and Intent  No   Self Injury other (see comment)  (None Observed)   Activity isolative;withdrawn   Speech coherent   Medication Sensitivity no stated side effects;no observed side effects   Psychomotor / Gait balanced;steady   Coping/Psychosocial   Verbalized Emotional State frustration   Activities of Daily Living   Hygiene/Grooming handwashing;shower;independent   Oral Hygiene independent   Dress street clothes;independent   Laundry unable to complete   Room Organization independent

## 2018-06-10 PROCEDURE — 25000132 ZZH RX MED GY IP 250 OP 250 PS 637: Performed by: PSYCHIATRY & NEUROLOGY

## 2018-06-10 PROCEDURE — 12400007 ZZH R&B MH INTERMEDIATE UMMC

## 2018-06-10 RX ADMIN — VITAMIN D, TAB 1000IU (100/BT) 2000 UNITS: 25 TAB at 08:51

## 2018-06-10 RX ADMIN — CYANOCOBALAMIN TAB 1000 MCG 1000 MCG: 1000 TAB at 08:51

## 2018-06-10 RX ADMIN — OLANZAPINE 7.5 MG: 5 TABLET, FILM COATED ORAL at 08:51

## 2018-06-10 RX ADMIN — DIVALPROEX SODIUM 1750 MG: 250 TABLET, FILM COATED, EXTENDED RELEASE ORAL at 20:45

## 2018-06-10 RX ADMIN — NICOTINE 1 PATCH: 21 PATCH, EXTENDED RELEASE TRANSDERMAL at 19:41

## 2018-06-10 RX ADMIN — MIRTAZAPINE 15 MG: 15 TABLET, FILM COATED ORAL at 20:46

## 2018-06-10 RX ADMIN — LISINOPRIL 20 MG: 20 TABLET ORAL at 08:51

## 2018-06-10 RX ADMIN — OLANZAPINE 5 MG: 5 TABLET, FILM COATED ORAL at 20:46

## 2018-06-10 RX ADMIN — IBUPROFEN 600 MG: 600 TABLET ORAL at 20:47

## 2018-06-10 ASSESSMENT — ACTIVITIES OF DAILY LIVING (ADL)
LAUNDRY: WITH SUPERVISION
DRESS: INDEPENDENT
GROOMING: INDEPENDENT
ORAL_HYGIENE: INDEPENDENT

## 2018-06-10 NOTE — PLAN OF CARE
Problem: General Plan of Care (Inpatient Behavioral)  Goal: Individualization/Patient Specific Goal (IP Behavioral)  The patient and/or their representative will achieve their patient-specific goals related to the plan of care.    The patient-specific goals include:   Outcome: Adequate for Discharge Date Met: 06/10/18  Pt had rule 25 done this weekend, other wise up for meals and then back to bed pt does not attend groups, pt has had no behavioral issues, mood is calm , he is med compliant.

## 2018-06-11 PROCEDURE — 12400007 ZZH R&B MH INTERMEDIATE UMMC

## 2018-06-11 PROCEDURE — 90853 GROUP PSYCHOTHERAPY: CPT

## 2018-06-11 PROCEDURE — 25000132 ZZH RX MED GY IP 250 OP 250 PS 637: Performed by: PSYCHIATRY & NEUROLOGY

## 2018-06-11 PROCEDURE — 99231 SBSQ HOSP IP/OBS SF/LOW 25: CPT | Performed by: PSYCHIATRY & NEUROLOGY

## 2018-06-11 RX ADMIN — LISINOPRIL 20 MG: 20 TABLET ORAL at 08:32

## 2018-06-11 RX ADMIN — MIRTAZAPINE 15 MG: 15 TABLET, FILM COATED ORAL at 20:26

## 2018-06-11 RX ADMIN — DIVALPROEX SODIUM 1750 MG: 250 TABLET, FILM COATED, EXTENDED RELEASE ORAL at 20:26

## 2018-06-11 RX ADMIN — VITAMIN D, TAB 1000IU (100/BT) 2000 UNITS: 25 TAB at 08:32

## 2018-06-11 RX ADMIN — NICOTINE 1 PATCH: 21 PATCH, EXTENDED RELEASE TRANSDERMAL at 20:26

## 2018-06-11 RX ADMIN — ACETAMINOPHEN 650 MG: 325 TABLET ORAL at 18:22

## 2018-06-11 RX ADMIN — OLANZAPINE 5 MG: 5 TABLET, FILM COATED ORAL at 20:26

## 2018-06-11 RX ADMIN — OLANZAPINE 7.5 MG: 5 TABLET, FILM COATED ORAL at 08:32

## 2018-06-11 RX ADMIN — CYANOCOBALAMIN TAB 1000 MCG 1000 MCG: 1000 TAB at 08:32

## 2018-06-11 ASSESSMENT — ACTIVITIES OF DAILY LIVING (ADL)
LAUNDRY: WITH SUPERVISION
DRESS: STREET CLOTHES
GROOMING: INDEPENDENT
ORAL_HYGIENE: INDEPENDENT
ORAL_HYGIENE: INDEPENDENT
GROOMING: INDEPENDENT
LAUNDRY: WITH SUPERVISION
DRESS: STREET CLOTHES

## 2018-06-11 NOTE — PLAN OF CARE
"Problem: Social/Occupational/Functional Impairment (Depressive Signs/Symptoms) (Adult)  Goal: Improved Social/Occupational/Functional Skills (Depressive Signs/Symptoms)    Pt attended 1 out of 3 OT groups offered, which is an improvement in participation, as pt has not attended group since 5/11. Pt actively participated in a structured occupational therapy group with a focus on connecting song titles to life events and personal feelings. Using a list of song titles, pt identified You Can't Always Get What You Want, Big Shot (\"when I go gambling\"), The Gambler (\"my favorite thing to do is rankin\"), and Amazing Rhona (\"because I'm a believer\") as song titles that relate to his life. Additionally, pt identified  \"Come Sail Away\" as a song title that indicates something about his future, explaining that he would like to \"sail the oceans\" in the future, but acknowledged that it might not be financially realistic. Pt then completed a visual scanning task while listening to identified songs to facilitate focus and mood elevation, which he shared was difficult for him to concentrate on because he has \"ADHD.\" Explained that he \"hates labels\" in the context of being labeled with a diagnosis. Pt was social throughout group, and appropriately expressed empathy towards a peer about receiving \"tough love\" from supportive family members. Pt shared that he has accepted the fact that he still has to be in the hospital, and that he has decided to \"make the best of it,\" appearing optimistic and future oriented. Pleasant, engaged, and bright throughout group. Continue to encourage group attendance and participation.         "

## 2018-06-11 NOTE — PROGRESS NOTES
06/11/18 1406   Behavioral Health   Hallucinations denies / not responding to hallucinations   Thinking distractable   Orientation time: oriented;date: oriented;place: oriented;person: oriented   Memory baseline memory   Insight poor   Judgement impaired   Eye Contact at examiner   Affect full range affect   Mood mood is calm   Physical Appearance/Attire appears stated age   Hygiene well groomed   Suicidality other (see comments)  (Denies)   1. Wish to be Dead No   2. Non-Specific Active Suicidal Thoughts  No   Self Injury other (see comment)  (Denies)   Activity other (see comment)  (Social with others)   Speech clear;coherent   Medication Sensitivity no stated side effects   Psychomotor / Gait balanced;steady   Psycho Education   Type of Intervention 1:1 intervention   Response participates, initiates socially appropriate   Hours 0.5   Activities of Daily Living   Hygiene/Grooming independent   Oral Hygiene independent   Dress street clothes   Laundry with supervision   Room Organization independent   Activity   Activity Assistance Provided independent   Pt attended groups this shift and he appears pleasant on approach. He denies suicidal ideation or hearing voices. He spent his time in the lounge and social with other.

## 2018-06-11 NOTE — PROGRESS NOTES
06/10/18 2200   Behavioral Health   Hallucinations denies / not responding to hallucinations   Thinking distractable   Orientation person: oriented;place: oriented   Memory baseline memory   Insight poor   Judgement impaired   Eye Contact at examiner   Affect full range affect   Mood mood is calm   Physical Appearance/Attire neat   Hygiene well groomed   Suicidality other (see comments)  (denies )   1. Wish to be Dead No   2. Non-Specific Active Suicidal Thoughts  No   Self Injury other (see comment)  (denies )   Elopement (none reported or observed )   Activity other (see comment)  (visible in the milieu )   Speech clear;coherent   Activities of Daily Living   Hygiene/Grooming independent   Oral Hygiene independent   Dress independent   Laundry with supervision   Room Organization independent       Pt denied SI and SIB.  Pt seems calm, ate supper, visible in the milieu and socialized with others.

## 2018-06-11 NOTE — PROGRESS NOTES
[facility], [place]   Psychiatric Progress Note        Interim History:     The patient's care was discussed with the treatment team during the daily team meeting and/or staff's chart notes were reviewed.      Reason for Admission: Patient has a history of schizoaffective disorder vs Bipolar affective disorder and stimulant use disorder. Was admitted by his CM because of disorganized behavior and violating conditions of PD. PD was subsequently revoked.     Interim: spends a lot of time in his bed. Today, however, went to groups, loudly stated that he was in a better mood and intended to avoid escalation to speed up his discharge. Reported that decrease in his day time Zyprexa helped him to stay more alert.          Medications:       cholecalciferol  2,000 Units Oral Daily     cyanocobalamin  1,000 mcg Oral Daily     divalproex sodium extended-release  1750 mg Oral At Bedtime     lisinopril  20 mg Oral Daily     mirtazapine  15 mg Oral At Bedtime     nicotine  1 patch Transdermal Daily     nicotine   Transdermal Q8H     nicotine   Transdermal Daily     OLANZapine  5 mg Oral At Bedtime     OLANZapine  7.5 mg Oral Daily     paliperidone  156 mg Intramuscular Q28 Days          Allergies:   No Known Allergies       Labs:     No results found for this or any previous visit (from the past 24 hour(s)).       Psychiatric Examination:     BP (!) 145/107  Pulse 97  Temp 97.8  F (36.6  C) (Oral)  Resp 16  Ht 1.829 m (6')  Wt 113.4 kg (250 lb)  SpO2 97%  BMI 33.91 kg/m2  Weight is 250 lbs 0 oz  Body mass index is 33.91 kg/(m^2).                  Lying Orthostatic BP: 149/79         Sitting Orthostatic BP: 145/107         Standing Orthostatic BP: 138/96      Appearance: somnolent, alert and dressed in hospital scrubs  Attitude: more cooperative  Eye Contact:  fair  Mood:  calm  Affect:  constricted mobility  Speech:  clear, coherent  Psychomotor Behavior:  no evidence of tardive dyskinesia,  dystonia, or tics  Throught Process:  logical and linear  Associations:  no loose associations  Thought Content:  no evidence of suicidal ideation or homicidal ideation and no evidence of openly psychotic thoughts? it is possible, though, that patient has some paranoia.   Insight: partial  Judgement: limited, but improving?  Oriented to:  time, person, and place  Attention Span and Concentration:  fair  Recent and Remote Memory:  fair    Clinical Global Impressions  First:  Considering your total clinical experience with this particular patient population, how severe are the patient's symptoms at this time?: 6 (05/03/18 1825)  Compared to the patient's condition at the START of treatment, this patient's condition is: 4 (05/03/18 1825)  Most recent:  Considering your total clinical experience with this particular patient population, how severe are the patient's symptoms at this time?: 4 6/8/2018 )  Compared to the patient's condition at the START of treatment, this patient's condition is: 2 (6/8/2018 )    # Pain Assessment:  Current Pain Score 6/11/2018   Patient currently in pain? denies   Pain score (0-10) -   Pain location -   Pain descriptors -   Nabor s pain level was assessed, he denies pain today.             Precautions:     Behavioral Orders   Procedures     Code 1 - Restrict to Unit     Routine Programming     As clinically indicated     Status 15     Every 15 minutes.     Suicide precautions     Patients on Suicide Precautions should have a Combination Diet ordered that includes a Diet selection(s) AND a Behavioral Tray selection for Safe Tray - with utensils, or Safe Tray - NO utensils            DIagnoses:     Bipolar affective disorder, mixed, history of diagnosis of schizoaffective disorder and schizophrenia as well, history of cannabis use disorder, rule out stimulant use disorder.          Plan:   Patient overall has been the same as before. He seems rather bored with being hospitalized, and denied  any imminent issues thus far. He is overall future oriented and looking forward with discharging. He seems to be sleeping in his room for most of the day, although comes out of his room for morning and evening community meeting and for meals, thus it seems he picks and chooses which activity to participate in. Generally speaking, he may be at baseline regarding his behavior. His mood lability can present a major problem for for placement. He tries to work on it. Will recheck his Valproic Acid level on Wednesday. He had his Rule 25 updated.    Medications:  - Depakote 1750 mg HS  - Remeron 15 mg HS  - Zyprexa 7.5 mg AM and 5 mg HS   - Invega Susstenna  mg (next dose 6/15).     Legal: Committed     Dispo: Awaiting placement. Awaiting        Vasu Chatterjee MD

## 2018-06-12 PROCEDURE — 25000132 ZZH RX MED GY IP 250 OP 250 PS 637: Performed by: PSYCHIATRY & NEUROLOGY

## 2018-06-12 PROCEDURE — 12400007 ZZH R&B MH INTERMEDIATE UMMC

## 2018-06-12 RX ADMIN — CYANOCOBALAMIN TAB 1000 MCG 1000 MCG: 1000 TAB at 12:17

## 2018-06-12 RX ADMIN — MIRTAZAPINE 15 MG: 15 TABLET, FILM COATED ORAL at 19:21

## 2018-06-12 RX ADMIN — VITAMIN D, TAB 1000IU (100/BT) 2000 UNITS: 25 TAB at 12:18

## 2018-06-12 RX ADMIN — DIVALPROEX SODIUM 1750 MG: 250 TABLET, FILM COATED, EXTENDED RELEASE ORAL at 19:22

## 2018-06-12 RX ADMIN — NICOTINE 1 PATCH: 21 PATCH, EXTENDED RELEASE TRANSDERMAL at 19:19

## 2018-06-12 RX ADMIN — LISINOPRIL 20 MG: 20 TABLET ORAL at 12:18

## 2018-06-12 RX ADMIN — OLANZAPINE 5 MG: 5 TABLET, FILM COATED ORAL at 19:21

## 2018-06-12 ASSESSMENT — ACTIVITIES OF DAILY LIVING (ADL)
DRESS: STREET CLOTHES
GROOMING: PROMPTS
ORAL_HYGIENE: INDEPENDENT
DRESS: STREET CLOTHES;INDEPENDENT
ORAL_HYGIENE: INDEPENDENT
GROOMING: PROMPTS;INDEPENDENT
LAUNDRY: WITH SUPERVISION

## 2018-06-12 NOTE — PROGRESS NOTES
06/11/18 2670   Behavioral Health   Hallucinations denies / not responding to hallucinations   Thinking intact   Orientation time: oriented;date: oriented;place: oriented;person: oriented   Memory baseline memory   Insight insight appropriate to situation   Judgement impaired   Eye Contact at examiner   Affect full range affect   Mood mood is calm   Physical Appearance/Attire appears stated age   Hygiene well groomed   Suicidality other (see comments)  (Denies)   1. Wish to be Dead No   2. Non-Specific Active Suicidal Thoughts  No   Self Injury other (see comment)  (Denies)   Activity other (see comment)  (Social with other peers.)   Medication Sensitivity no stated side effects   Psychomotor / Gait balanced;steady   Psycho Education   Type of Intervention 1:1 intervention   Response participates, initiates socially appropriate   Hours 0.5   Activities of Daily Living   Hygiene/Grooming independent   Oral Hygiene independent   Dress street clothes   Laundry with supervision   Room Organization independent   Activity   Activity Assistance Provided independent   Pt spent time in the lounge and back to his room napping. He denies suicidal ideation and reports that he hope to be discharged soon. He appears sociable with other peers and he did report to the writer that medications have been switched to nights instead of days which has been making him sleepy during the day.

## 2018-06-12 NOTE — PROGRESS NOTES
Nabor refused his morning olanzapine saying it makes him too tired.  However, he stayed in bed all morning without taking the AM olanzapine.

## 2018-06-12 NOTE — PROGRESS NOTES
"Pt took all scheduled hs medications without issue. Denies major side effects, but states feeling \"groggy.\"    No additional concerns at this time. Will continue to assess and offer support.  "

## 2018-06-13 PROCEDURE — 25000132 ZZH RX MED GY IP 250 OP 250 PS 637: Performed by: PSYCHIATRY & NEUROLOGY

## 2018-06-13 PROCEDURE — 99231 SBSQ HOSP IP/OBS SF/LOW 25: CPT | Performed by: PSYCHIATRY & NEUROLOGY

## 2018-06-13 PROCEDURE — 12400007 ZZH R&B MH INTERMEDIATE UMMC

## 2018-06-13 RX ORDER — DIVALPROEX SODIUM 250 MG/1
1750 TABLET, EXTENDED RELEASE ORAL AT BEDTIME
Qty: 210 TABLET | Refills: 1 | Status: SHIPPED | OUTPATIENT
Start: 2018-06-13 | End: 2018-06-14

## 2018-06-13 RX ORDER — OLANZAPINE 7.5 MG/1
7.5 TABLET, FILM COATED ORAL DAILY
Qty: 30 TABLET | Refills: 1 | Status: SHIPPED | OUTPATIENT
Start: 2018-06-14 | End: 2020-02-04

## 2018-06-13 RX ADMIN — DIVALPROEX SODIUM 1750 MG: 250 TABLET, FILM COATED, EXTENDED RELEASE ORAL at 20:12

## 2018-06-13 RX ADMIN — LISINOPRIL 20 MG: 20 TABLET ORAL at 07:50

## 2018-06-13 RX ADMIN — NICOTINE 1 PATCH: 21 PATCH, EXTENDED RELEASE TRANSDERMAL at 20:12

## 2018-06-13 RX ADMIN — MIRTAZAPINE 15 MG: 15 TABLET, FILM COATED ORAL at 20:12

## 2018-06-13 RX ADMIN — OLANZAPINE 7.5 MG: 5 TABLET, FILM COATED ORAL at 07:51

## 2018-06-13 RX ADMIN — VITAMIN D, TAB 1000IU (100/BT) 2000 UNITS: 25 TAB at 07:50

## 2018-06-13 RX ADMIN — OLANZAPINE 5 MG: 5 TABLET, FILM COATED ORAL at 20:12

## 2018-06-13 RX ADMIN — CYANOCOBALAMIN TAB 1000 MCG 1000 MCG: 1000 TAB at 07:50

## 2018-06-13 ASSESSMENT — ACTIVITIES OF DAILY LIVING (ADL)
ORAL_HYGIENE: INDEPENDENT
GROOMING: INDEPENDENT
LAUNDRY: UNABLE TO COMPLETE
DRESS: INDEPENDENT

## 2018-06-13 NOTE — PLAN OF CARE
Problem: Mood Impairment (Depressive Signs/Symptoms) (Adult)  Goal: Improved Mood Symptoms (Depressive Signs/Symptoms)  Outcome: No Change  48 Hour Assessment    BP (!) 135/95  Pulse 86  Temp 97.6  F (36.4  C) (Oral)  Resp 16  Ht 1.829 m (6')  Wt 113.4 kg (250 lb)  SpO2 97%  BMI 33.91 kg/m2    Pt declined formal check-in with this writer, but stated that he feels fine, and denies SI/SIB. He isolated to his room for the majority of the evening, coming to the lounge to eat and have snacks. Pt is pleasant upon approach, but is mostly withdrawn. He is taking his hs medications without issue, and denies side effects. He states being hopeful that [facility] will be funding his treatment.    SI/SIB: Pt denies. Has not made gestures or comments indicating suicidality.     Auditory/Visual Hallucinations: Pt denies. Does not appear responding.     No additional concerns at this time. Will continue to assess and offer support.

## 2018-06-13 NOTE — PROGRESS NOTES
[facility], [place]   Psychiatric Progress Note        Interim History:     The patient's care was discussed with the treatment team during the daily team meeting and/or staff's chart notes were reviewed.      Reason for Admission: Patient has a history of schizoaffective disorder vs Bipolar affective disorder and stimulant use disorder. Was admitted by his CM because of disorganized behavior and violating conditions of PD. PD was subsequently revoked.     Interim: was seen in his bed, said that he was excited to hear that he could, finally, go to  treatment ([facility]). Denied presence of Suicidal ideation, psychotic symptoms, promised to keep his temper under control while in CD treatment.          Medications:       cholecalciferol  2,000 Units Oral Daily     cyanocobalamin  1,000 mcg Oral Daily     divalproex sodium extended-release  1750 mg Oral At Bedtime     lisinopril  20 mg Oral Daily     mirtazapine  15 mg Oral At Bedtime     nicotine  1 patch Transdermal Daily     nicotine   Transdermal Q8H     nicotine   Transdermal Daily     OLANZapine  5 mg Oral At Bedtime     OLANZapine  7.5 mg Oral Daily     paliperidone  156 mg Intramuscular Q28 Days          Allergies:   No Known Allergies       Labs:     No results found for this or any previous visit (from the past 24 hour(s)).       Psychiatric Examination:     BP (!) 135/95  Pulse 86  Temp 97.8  F (36.6  C) (Oral)  Resp 14  Ht 1.829 m (6')  Wt 113.4 kg (250 lb)  SpO2 97%  BMI 33.91 kg/m2  Weight is 250 lbs 0 oz  Body mass index is 33.91 kg/(m^2).                       Lying Orthostatic BP: 149/79         Sitting Orthostatic BP: 159/107         Standing Orthostatic BP: 127/100      Appearance: somnolent, alert and dressed in hospital scrubs  Attitude: more cooperative  Eye Contact:  fair  Mood:  calm  Affect:  constricted mobility  Speech:  clear, coherent  Psychomotor Behavior:  no evidence of tardive dyskinesia,  dystonia, or tics  Throught Process:  logical and linear  Associations:  no loose associations  Thought Content:  no evidence of suicidal ideation or homicidal ideation and no evidence of openly psychotic thoughts  Insight: partial  Judgement: limited, but improving?  Oriented to:  time, person, and place  Attention Span and Concentration:  fair  Recent and Remote Memory:  fair    Clinical Global Impressions  First:  Considering your total clinical experience with this particular patient population, how severe are the patient's symptoms at this time?: 6 (05/03/18 1825)  Compared to the patient's condition at the START of treatment, this patient's condition is: 4 (05/03/18 1825)  Most recent:  Considering your total clinical experience with this particular patient population, how severe are the patient's symptoms at this time?: 4 6/8/2018 )  Compared to the patient's condition at the START of treatment, this patient's condition is: 2 (6/8/2018 )    # Pain Assessment:  Current Pain Score 6/13/2018   Patient currently in pain? no   Pain score (0-10) -   Pain location -   Pain descriptors -   Nabor s pain level was assessed, he denies pain today.             Precautions:     Behavioral Orders   Procedures     Code 1 - Restrict to Unit     Routine Programming     As clinically indicated     Status 15     Every 15 minutes.     Suicide precautions     Patients on Suicide Precautions should have a Combination Diet ordered that includes a Diet selection(s) AND a Behavioral Tray selection for Safe Tray - with utensils, or Safe Tray - NO utensils            DIagnoses:     Bipolar affective disorder, mixed, history of diagnosis of schizoaffective disorder and schizophrenia as well, history of cannabis use disorder, rule out stimulant use disorder.          Plan:   Patient overall has been the same as before. He seems rather bored with being hospitalized, and denied any imminent issues thus far. He is overall future oriented and  looking forward with discharging. He seems to be sleeping in his room for most of the day, although comes out of his room for morning and evening community meeting and for meals, thus it seems he picks and chooses which activity to participate in. Generally speaking, he may be at baseline regarding his behavior. His mood lability can present a major problem for for placement. He tries to work on it. Will recheck his Valproic Acid level on tomorrow. He will be discharged to [facility] tomorrow.    Medications:  - Depakote 1750 mg HS  - Remeron 15 mg HS  - Zyprexa 7.5 mg AM and 5 mg HS   - Invega Susstenna  mg (next dose 6/15).     Legal: Committed     Dispo: Awaiting placement. Awaiting        Vasu Chatterjee MD

## 2018-06-14 LAB — VALPROATE SERPL-MCNC: 102 MG/L (ref 50–100)

## 2018-06-14 PROCEDURE — 25000132 ZZH RX MED GY IP 250 OP 250 PS 637: Performed by: PSYCHIATRY & NEUROLOGY

## 2018-06-14 PROCEDURE — 99232 SBSQ HOSP IP/OBS MODERATE 35: CPT | Performed by: PSYCHIATRY & NEUROLOGY

## 2018-06-14 PROCEDURE — 12400007 ZZH R&B MH INTERMEDIATE UMMC

## 2018-06-14 PROCEDURE — 36415 COLL VENOUS BLD VENIPUNCTURE: CPT | Performed by: PSYCHIATRY & NEUROLOGY

## 2018-06-14 PROCEDURE — 80164 ASSAY DIPROPYLACETIC ACD TOT: CPT | Performed by: PSYCHIATRY & NEUROLOGY

## 2018-06-14 RX ORDER — DIVALPROEX SODIUM 250 MG/1
1500 TABLET, EXTENDED RELEASE ORAL AT BEDTIME
Qty: 210 TABLET | Refills: 1 | Status: SHIPPED | OUTPATIENT
Start: 2018-06-14 | End: 2020-02-04

## 2018-06-14 RX ADMIN — LISINOPRIL 20 MG: 20 TABLET ORAL at 08:44

## 2018-06-14 RX ADMIN — VITAMIN D, TAB 1000IU (100/BT) 2000 UNITS: 25 TAB at 08:44

## 2018-06-14 RX ADMIN — OLANZAPINE 5 MG: 5 TABLET, FILM COATED ORAL at 19:26

## 2018-06-14 RX ADMIN — CYANOCOBALAMIN TAB 1000 MCG 1000 MCG: 1000 TAB at 08:44

## 2018-06-14 RX ADMIN — IBUPROFEN 600 MG: 600 TABLET ORAL at 10:51

## 2018-06-14 RX ADMIN — NICOTINE 1 PATCH: 21 PATCH, EXTENDED RELEASE TRANSDERMAL at 19:25

## 2018-06-14 RX ADMIN — DIVALPROEX SODIUM 1750 MG: 250 TABLET, FILM COATED, EXTENDED RELEASE ORAL at 19:26

## 2018-06-14 RX ADMIN — MIRTAZAPINE 15 MG: 15 TABLET, FILM COATED ORAL at 20:00

## 2018-06-14 RX ADMIN — OLANZAPINE 7.5 MG: 5 TABLET, FILM COATED ORAL at 08:44

## 2018-06-14 ASSESSMENT — ACTIVITIES OF DAILY LIVING (ADL)
GROOMING: INDEPENDENT
DRESS: INDEPENDENT;STREET CLOTHES
ORAL_HYGIENE: INDEPENDENT
DRESS: INDEPENDENT
LAUNDRY: UNABLE TO COMPLETE
GROOMING: INDEPENDENT
ORAL_HYGIENE: INDEPENDENT
LAUNDRY: WITH SUPERVISION

## 2018-06-14 NOTE — PROGRESS NOTES
[facility], [place]   Psychiatric Progress Note        Interim History:     The patient's care was discussed with the treatment team during the daily team meeting and/or staff's chart notes were reviewed.      Reason for Admission: Patient has a history of schizoaffective disorder vs Bipolar affective disorder and stimulant use disorder. Was admitted by his CM because of disorganized behavior and violating conditions of PD. PD was subsequently revoked.     Interim: spent more time outside of his room. Reported that decrease in his day time Zyprexa helped him to stay more alert. Took shower, excited to hear that he will be discharged and go to [facility] tomorrow. Pleasant and polite. Valproic Acid level today was 102.          Medications:       cholecalciferol  2,000 Units Oral Daily     cyanocobalamin  1,000 mcg Oral Daily     divalproex sodium extended-release  1500 mg Oral At Bedtime     lisinopril  20 mg Oral Daily     mirtazapine  15 mg Oral At Bedtime     nicotine  1 patch Transdermal Daily     nicotine   Transdermal Q8H     nicotine   Transdermal Daily     OLANZapine  5 mg Oral At Bedtime     OLANZapine  7.5 mg Oral Daily     paliperidone  156 mg Intramuscular Q28 Days          Allergies:   No Known Allergies       Labs:     Recent Results (from the past 24 hour(s))   Valproic acid    Collection Time: 06/14/18  8:06 AM   Result Value Ref Range    Valproic Acid Level 102 (H) 50 - 100 mg/L          Psychiatric Examination:     /87 (BP Location: Left arm)  Pulse 93  Temp 97.8  F (36.6  C) (Oral)  Resp 16  Ht 1.829 m (6')  Wt 112.9 kg (249 lb)  SpO2 97%  BMI 33.77 kg/m2  Weight is 249 lbs 0 oz  Body mass index is 33.77 kg/(m^2).                      Lying Orthostatic BP: 149/79         Sitting Orthostatic BP: 167/115         Standing Orthostatic BP: 137/95      Appearance: somnolent, alert and dressed in hospital scrubs  Attitude: more cooperative  Eye Contact:   fair  Mood:  calm  Affect:  constricted mobility  Speech:  clear, coherent  Psychomotor Behavior:  no evidence of tardive dyskinesia, dystonia, or tics  Throught Process:  logical and linear  Associations:  no loose associations  Thought Content:  no evidence of suicidal ideation or homicidal ideation and no evidence of openly psychotic thoughts  Insight: partial  Judgement: limited, but improving?  Oriented to:  time, person, and place  Attention Span and Concentration:  fair  Recent and Remote Memory:  fair    Clinical Global Impressions  First:  Considering your total clinical experience with this particular patient population, how severe are the patient's symptoms at this time?: 6 (05/03/18 1825)  Compared to the patient's condition at the START of treatment, this patient's condition is: 4 (05/03/18 1825)  Most recent:  Considering your total clinical experience with this particular patient population, how severe are the patient's symptoms at this time?: 4 6/8/2018 )  Compared to the patient's condition at the START of treatment, this patient's condition is: 2 (6/8/2018 )    # Pain Assessment:  Current Pain Score 6/14/2018   Patient currently in pain? no   Pain score (0-10) -   Pain location -   Pain descriptors -   Nabor s pain level was assessed, he denies pain today.             Precautions:     Behavioral Orders   Procedures     Code 1 - Restrict to Unit     Routine Programming     As clinically indicated     Status 15     Every 15 minutes.     Suicide precautions     Patients on Suicide Precautions should have a Combination Diet ordered that includes a Diet selection(s) AND a Behavioral Tray selection for Safe Tray - with utensils, or Safe Tray - NO utensils            DIagnoses:     Bipolar affective disorder, mixed, history of diagnosis of schizoaffective disorder and schizophrenia as well, history of cannabis use disorder, rule out stimulant use disorder.          Plan:   In a good mood with plan for  discharge to [facility] tomorrow. I placed an inpatient pharmacy consult to authorize injection of Sustenna. Because of elevated valproic acid level will decrease dose of Depakote to 1500 mg qhs.     Medications:  - Depakote 1500 mg HS  - Remeron 15 mg HS  - Zyprexa 7.5 mg AM and 5 mg HS   - Invega Susstenna  mg (next dose 6/15).     Legal: Committed     Dispo: Awaiting placement. Awaiting        Vasu Chatterjee MD

## 2018-06-14 NOTE — PLAN OF CARE
Problem: Mood Impairment (Depressive Signs/Symptoms) (Adult)  Goal: Improved Mood Symptoms (Depressive Signs/Symptoms)    Pt has been out in the milieu this shift.  Mood is calm and pt has been pleasant upon approach.  Pt took a shower and is well groomed.  Pt anticipates to discharge today to go to treatment although he is not sure of the time.  Pt denies auditory and visual hallucinations. Pt was med compliant and denies SI.  Will continue to assess.

## 2018-06-15 VITALS
OXYGEN SATURATION: 97 % | DIASTOLIC BLOOD PRESSURE: 87 MMHG | TEMPERATURE: 98.5 F | WEIGHT: 249 LBS | HEART RATE: 93 BPM | BODY MASS INDEX: 33.72 KG/M2 | SYSTOLIC BLOOD PRESSURE: 143 MMHG | RESPIRATION RATE: 16 BRPM | HEIGHT: 72 IN

## 2018-06-15 PROCEDURE — 25000132 ZZH RX MED GY IP 250 OP 250 PS 637: Performed by: PSYCHIATRY & NEUROLOGY

## 2018-06-15 PROCEDURE — 99238 HOSP IP/OBS DSCHRG MGMT 30/<: CPT | Performed by: PSYCHIATRY & NEUROLOGY

## 2018-06-15 RX ADMIN — OLANZAPINE 7.5 MG: 5 TABLET, FILM COATED ORAL at 08:41

## 2018-06-15 RX ADMIN — LISINOPRIL 20 MG: 20 TABLET ORAL at 08:41

## 2018-06-15 RX ADMIN — CYANOCOBALAMIN TAB 1000 MCG 1000 MCG: 1000 TAB at 08:41

## 2018-06-15 RX ADMIN — VITAMIN D, TAB 1000IU (100/BT) 2000 UNITS: 25 TAB at 08:41

## 2018-06-15 ASSESSMENT — ACTIVITIES OF DAILY LIVING (ADL)
ORAL_HYGIENE: INDEPENDENT
GROOMING: INDEPENDENT
DRESS: STREET CLOTHES;INDEPENDENT

## 2018-06-15 NOTE — PROGRESS NOTES
Patient ambulated to meet taxi to take to [facility].  All belongings returned said his good byes and excited to have place to go too.

## 2018-06-15 NOTE — DISCHARGE INSTRUCTIONS
Behavioral Discharge Planning and Instructions      Summary:  You were admitted on 5/2/2018  due to relapsing and mood disorder.  You were treated by Dr. Vasu Chatterjee MD and discharged on 6/15/18 from [facility] [place] [facility]: [address] [place] [address]. Patient's provisional discharge (PD) was revoke from the [facility], [facility]. Wale Woo ([phone]) from [facility] will complete the new PD.      Principal Diagnosis: Schizophrenia      Health Care Follow-up Appointments:   [facility] Psychiatry [phone]  [address] [facility]  Dr. Torrez Tuesday July 24th @ 9:40am  Invega Injection: Monday June 18th @ 9:20  Invega Injection on July 13th @ 1:20        : April Colon  [phone]          Attend all scheduled appointments with your outpatient providers. Call at least 24 hours in advance if you need to reschedule an appointment to ensure continued access to your outpatient providers.   Major Treatments, Procedures and Findings:  You were provided with: a psychiatric assessment, medication evaluation and/or management and CD evaluation/assessment    Symptoms to Report: feeling more aggressive, increased confusion, losing more sleep, mood getting worse or thoughts of suicide    Early warning signs can include: increased depression or anxiety sleep disturbances increased thoughts or behaviors of suicide or self-harm  increased unusual thinking, such as paranoia or hearing voices    Safety and Wellness:  Take all medicines as directed.  Make no changes unless your doctor suggests them.      Follow treatment recommendations.  Refrain from alcohol and non-prescribed drugs.  If there is a concern for safety, call 911.    Resources:   Crisis Intervention: 831.498.8315 or 245-415-5778 (TTY: 499.985.8956).  Call anytime for help.  [employer] [place] [employer] Mental Illness (www.mn.[place].org): 244.851.3820 or 776-334-1997.  Alcoholics Anonymous (www.alcoholics-anonymous.org): Check your phone book  for your local chapter.  [facility] (COPE) Response - Adult [phone]    The treatment team has appreciated the opportunity to work with you.     If you have any questions or concerns our unit number is [phone].

## 2018-06-15 NOTE — PLAN OF CARE
Problem: Mood Impairment (Depressive Signs/Symptoms) (Adult)  Goal: Improved Mood Symptoms (Depressive Signs/Symptoms)  Outcome: Improving  48 Hour Assessment    /87 (BP Location: Left arm)  Pulse 93  Temp 97.8  F (36.6  C) (Oral)  Resp 16  Ht 1.829 m (6')  Wt 112.9 kg (249 lb)  SpO2 97%  BMI 33.77 kg/m2    Pt visible in milieu off and on throughout the shift, but mostly isolated to his room. He states that he is looking forward to discharge, and that he will keep himself sober and follow his treatment program upon discharge.    SI/SIB: Pt denies.     Auditory/Visual Hallucinations: Pt denies. Does not appear responding.    Pt states that he will request his Invega injection to be filled by [facility], as he received his dose on May 18th.     Pt took all medications without issue. Will continue to assess and offer support.

## 2018-06-15 NOTE — PLAN OF CARE
Problem: Feelings of Worthlessness, Hopelessness, Excessive Guilt (Depressive Signs/Symptoms) (Adult)  Goal: Enhanced Self-Esteem/Confidence (Depressive Signs/Symptoms)  Outcome: Adequate for Discharge Date Met: 06/15/18  Patient is dressed and packed and ready for discharge.  Very happy to leave hospital today.  Independent with cares.  Appetite is good will get Invega injection on Monday . Will be cabbed to [place] [facility]. Reviewed follow up care appointment and especially Monday June 18th for Invega injection.  No questions and no concerns.

## 2018-06-18 NOTE — DISCHARGE SUMMARY
"Admit Date:     05/02/2018   Discharge Date:     06/15/2018      The patient was hospitalized under the care of Dr. Chatterjee between 05/02 and 06/15/2018.      CHIEF COMPLAINT AND REASON FOR ADMISSION:  The patient is a 48-year-old  male who was transported to [facility] Emergency Department by his sister due to suicidal ideation.  The patient was pretty irritable during the interview while talking about his past mental health history.  According to collateral sources has a history of bipolar affective disorder, but also schizoaffective disorder, mostly managed his symptoms through [facility] while he was hospitalized where he was receiving monthly Invega injections as well as treated with Remeron and Depakote.  However, he recently changed insurance, says that [facility] was no longer in network.  Patient presented voluntarily for suicidal ideations, says that he \"could not take it anymore and that people are always taking from me and robbing me.\"  He also stated that he had friends, family who were stealing from him often.  The patient is currently under a 6-month commitment through [facility].  According to the patient's sister who had talked to DEC, the patient was talking about taking him to a bridge so he could jump off.  He denied presence of suicidal thoughts before.  Apparently he also got kicked out from his apartment for allowing someone else to live there.  According to the sister, the patient uses drugs and alcohol to make himself feel better.  Urine drug screen was positive for THC and amphetamines.  He admitted that he took someone else's Adderall.  At the same time, he reported that he was compliant with his medication and in fact, his valproic acid level was within therapeutic range.        DISCHARGE DIAGNOSES:  Bipolar affective disorder, mixed, history of diagnosis of schizoaffective disorder and schizophrenia, history of cannabis use " disorder, rule out stimulant use disorder.        CONSULTS:  There were no consults performed during this hospital stay.        LAB WORK:  Patient had basic metabolic panel on admission, which was unremarkable.  TSH was normal.  Glucose was 123.  CBC with differential was normal.  Valproic acid on admission was 79.  Valproic acid was repeated on 05/10 99, on 06/08 117 and on 06/14 it was 102.  Urine drug screen was positive for cannabinoids and amphetamines.  Ethanol gram per deciliter was less than 0.01.      HOSPITAL COURSE:  After the patient came to this facility he initially presented as pretty irritable, depressed, preferred to spend a lot of time in his bed.  He could escalate quite quickly, going from being pleasant and overall cooperative to getting angry, could become intimidating and even start throwing things around.  He himself admitted that he needed medication to calm his self down and used Zyprexa as needed on quite a regular basis.  He showed no evident psychotic symptoms other than possibly some paranoia connected with the idea that people including his  are talking about him behind his back and tried to harm him and prolong his hospital stay.  He repeatedly denied presence of auditory or visual hallucinations.  During this hospital stay the patient's Depakote dose was gradually adjusted up.  He was treated with scheduled Zyprexa and Zyprexa p.r.n. and Invega injections while continued on Remeron.  The patient was due for his Invega injection on the day of discharge, but because the hospital pharmacy does not carry Invega, the injection was scheduled on Monday on 06/18.  Significant attempts were made to get patient into chemical dependency treatment program.  He was interviewed by a number of programs.  Unfortunately, because of his periodical agitation he was also rejected by a number of programs, but eventually was admitted to a program called [facility].  The patient was very  happy to hear that he would be discharged and the last couple days of hospitalization was pretty pleasant, polite, animated and future focused.  He denied presence of suicidal or homicidal thoughts, did not show any evidence of psychotic symptoms.  Thought processes were somewhat concrete, but linear and goal directed.        DISCHARGE APPOINTMENTS:  At [facility] psychiatrist is Dr. Torrez on  at 9:40 a.m. Invega injection will be  at 9:20 a.m., then repeated on  at 1:20.   is Eri Colon.  Phone number [phone].        MEDICATIONS:   1.  Hydroxyzine 25 mg every 4 hours as needed for anxiety.   2.  Ibuprofen 600 mg every 6 hours as needed for moderate pain.     3.  Nicotine 21 mg 1 patch daily.   4.  Zyprexa 5 mg at bedtime.     5.  Zyprexa 7.5 mg daily.     6.  Depakote extended release 1500 mg at bedtime.   7.  Melatonin 3 mg at bedtime p.r.n. for sleep.     8.  Paliperidone 156 mg per mL every 28 days.   9.  Cyanocobalamin 1000 mcg daily.   10.  Lisinopril 20 mg daily.   11.  Remeron 15 mg at bedtime.   12.  Vitamin D3 1000, he needs caps, take 2 caps daily.         CHARITY PEARCE MD             D: 2018   T: 2018   MT: SHONA      Name:     ZHANNA HOWARD   MRN:      [id number]        Account:        [id number]   :      1970           Admit Date:     2018                                  Discharge Date: 06/15/2018      Document: [id number]

## 2018-06-19 ENCOUNTER — TELEPHONE (OUTPATIENT)
Dept: PHARMACY | Facility: OTHER | Age: 48
End: 2018-06-19

## 2018-06-19 NOTE — TELEPHONE ENCOUNTER
MTM referral from: Transitions of Care (recent hospital discharge or ED visit)    MTM referral outreach attempt #2 on June 19, 2018 at 2:07 PM      Outcome: Patient is not interested at this time because he was d/c to a treatment facility, will route to MTM Pharmacist/Provider as an FYI. Thank you for the referral.     Suzette Palmer, MTM Coordinator

## 2020-02-04 ENCOUNTER — HOSPITAL ENCOUNTER (EMERGENCY)
Facility: CLINIC | Age: 50
Discharge: PSYCHIATRIC OR SUBSTANCE USE FACILITY | End: 2020-02-05
Attending: EMERGENCY MEDICINE | Admitting: EMERGENCY MEDICINE

## 2020-02-04 ENCOUNTER — TELEPHONE (OUTPATIENT)
Dept: BEHAVIORAL HEALTH | Facility: CLINIC | Age: 50
End: 2020-02-04

## 2020-02-04 VITALS
OXYGEN SATURATION: 98 % | WEIGHT: 250 LBS | DIASTOLIC BLOOD PRESSURE: 91 MMHG | TEMPERATURE: 97.4 F | HEART RATE: 95 BPM | SYSTOLIC BLOOD PRESSURE: 148 MMHG | BODY MASS INDEX: 33.91 KG/M2

## 2020-02-04 DIAGNOSIS — F32.A DEPRESSION, UNSPECIFIED DEPRESSION TYPE: ICD-10-CM

## 2020-02-04 DIAGNOSIS — R45.851 SUICIDAL IDEATION: ICD-10-CM

## 2020-02-04 LAB
ALCOHOL BREATH TEST: 0 (ref 0–0.01)
AMPHETAMINES UR QL SCN: POSITIVE
BARBITURATES UR QL: NEGATIVE
BENZODIAZ UR QL: NEGATIVE
CANNABINOIDS UR QL SCN: POSITIVE
COCAINE UR QL: NEGATIVE
OPIATES UR QL SCN: NEGATIVE
PCP UR QL SCN: NEGATIVE

## 2020-02-04 PROCEDURE — 90791 PSYCH DIAGNOSTIC EVALUATION: CPT

## 2020-02-04 PROCEDURE — 80307 DRUG TEST PRSMV CHEM ANLYZR: CPT | Performed by: EMERGENCY MEDICINE

## 2020-02-04 PROCEDURE — 99285 EMERGENCY DEPT VISIT HI MDM: CPT | Mod: 25

## 2020-02-04 PROCEDURE — 82075 ASSAY OF BREATH ETHANOL: CPT

## 2020-02-04 RX ORDER — LISINOPRIL 10 MG/1
20 TABLET ORAL DAILY
Status: DISCONTINUED | OUTPATIENT
Start: 2020-02-05 | End: 2020-02-05 | Stop reason: HOSPADM

## 2020-02-04 RX ORDER — VITAMIN B COMPLEX
2000 TABLET ORAL DAILY
Status: DISCONTINUED | OUTPATIENT
Start: 2020-02-05 | End: 2020-02-05 | Stop reason: HOSPADM

## 2020-02-04 RX ORDER — HYDROXYZINE HYDROCHLORIDE 25 MG/1
25 TABLET, FILM COATED ORAL EVERY 4 HOURS PRN
Status: DISCONTINUED | OUTPATIENT
Start: 2020-02-04 | End: 2020-02-05 | Stop reason: HOSPADM

## 2020-02-04 NOTE — ED TRIAGE NOTES
Pt reports being suicidal due to financial  Hardships. 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

## 2020-02-04 NOTE — TELEPHONE ENCOUNTER
S: [facility] ED Dec  calling for placement.    Patient cleared and ready for behavioral bed placement: Yes    B: 49 y.o male SI with plan to shoot or stab himself. No access to guns, had access to knifes. 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 Hx of Schizoaffective Bipolar Type. Hx substance abuse - clean for a couple of years except cannabis once in a while. Receiving monthly IM Invega from Psychiatrist. [facility] 2018 Unable to contract for safety outside of the hospital, has a lack of social/family support  Comes off with cognitive impairment - low average intelligence, processing through coping skills can be difficult. 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    Medically cleared  Labs: none  Utox: ordered    A: Voluntary - TC area only.     R: patient placed on wait list. No appropriate bed placement available at this time.

## 2020-02-04 NOTE — PHARMACY-ADMISSION MEDICATION HISTORY
Pharmacy Medication History  Admission medication history interview status for the 2/4/2020  admission is complete. See EPIC admission navigator for prior to admission medications     Medication history sources: Patient  Medication history source reliability: Moderate  Adherence assessment: Poor    Significant changes made to the medication list:  On invega . Quit taking depakote 2000mg q hs, quit mirtazipine 15mg hs       Additional medication history information:   Patient was alert and able to articulate what he was taking without propting     Medication reconciliation completed by provider prior to medication history? No    Time spent in this activity: 15min      Prior to Admission medications    Medication Sig Last Dose Taking? Auth Provider   Cholecalciferol (VITAMIN D3) 1000 units CAPS Take 2,000 Units by mouth daily 2/4/2020 at Unknown time Yes Jamaal Golden MD   lisinopril (PRINIVIL/ZESTRIL) 20 MG tablet Take 1 tablet (20 mg) by mouth daily 2/4/2020 at Unknown time Yes Jamaal Golden MD   paliperidone (INVEGA SUSTENNA) 156 MG/ML SUSP injection Inject 1 mL (156 mg) into the muscle every 28 days Next dose 6/15/2018 1/27/2020  Jamaal Golden MD

## 2020-02-04 NOTE — ED PROVIDER NOTES
"  History     Chief Complaint:  Suicidal      HPI   Nabor Mendoza is a 49 year old male with a history of bipolar 1 disorder, schizoaffective disorder, depression, anxiety, and suicidal ideation who presents to the emergency department for evaluation of suicidal ideation. 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 Today, he passively thought about suicide using either a gun or knife, but states he does not have access to a gun. When he thought about going to buy a gun, he decided to come here instead. He states that he would like to be admitted until he can \"get a job again.\" His psychiatrist is Dr. Torrez, he is on Invega, and he states he is taking his medications. He denies alcohol or drug use today; he last smoked marijuana 7 days ago and usually has 3 drinks once a week.     Allergies:  No Known Drug Allergies    Medications:    Depakote  Atarax  Lisinopril  Melatonin  Remeron  Nicoderm CQ  Zyprexa  Invega     Past Medical History:    Anxiety  Bipolar 1 disorder  Depression  Hypertension   Schizoaffective disorder   Suicidal ideation   IMTIAZ   Vitamin D insufficiency     Past Surgical History:    Tonsillectomy   Septoplasty and turbinoplasty   Uvulopalatopharyngoplasty     Family History:    Alcohol/drug   Diabetes  MI  Heart disease: mother, father     Social History:  Tobacco Use: Daily, 1 ppd  Alcohol Use: Yes  Other: Marijuana use  PCP: Herman Torrez  Marital Status:  Single      Review of Systems   Psychiatric/Behavioral: Positive for suicidal ideas. Negative for self-injury.   All other systems reviewed and are negative.      Physical Exam     Patient Vitals for the past 24 hrs:   BP Temp Temp src Pulse SpO2 Weight   02/04/20 1408 -- -- -- -- -- 113.4 kg (250 lb)   02/04/20 1346 -- 97.4  F (36.3  C) Oral -- 98 % --   02/04/20 " 1344 (!) 148/91 -- -- 95 -- --       Physical Exam  General/Appearance: appears stated age, well-groomed, appears comfortable  Eyes: EOMI, no scleral injection, no icterus  ENT: MMM  Neck: supple, nl ROM, no stiffness  Cardiovascular: RRR, nl S1S2, no m/r/g, 2+ pulses in all 4 extremities, cap refill <2sec  Respiratory: CTAB, good air movement throughout, no wheezes/rhonchi/rales, no increased WOB, no retractions  Back: no lesions  GI: abd soft, non-distended, nttp,  no HSM, no rebound, no guarding, nl BS  MSK: TUTTLE, good tone, no bony abnormality  Skin: warm and well-perfused, no rash, no edema, no ecchymosis, nl turgor  Neuro: GCS 15, alert and oriented, no gross focal neuro deficits  Psych:    Appearance: Casually dressed, appears stated age.     Attitude: Cooperative.     Eye Contact: Fair.     Speech: Regular rate rhythm normal volume and tone    Psychomotor Behavior: Normal    Mood: depressed    Affect: flat    Thought Process: Intact    Thought Content: passive SI. - HI. - paranoia. - hallucinations    Insight: limited    Judgment: limited    Oriented to: Person place and time.     Attention Span and Concentration: Intact     Recent and Remote Memory: Intact  Heme: no petechia, no purpura, no active bleeding    Emergency Department Course   Laboratory:  Alcohol breath test: 0.00    Emergency Department Course:  1346 Nursing notes and vitals reviewed. I performed an exam of the patient as documented above.     DEC consulted the patient at bedside.     2252 I talked with DEC. Patient is voluntary for admission.     Findings and plan explained to the Patient. Patient signed out to my partner, Dr. Cunningham, pending bed placement.     Impression & Plan      Medical Decision Making:  This patient is a 49-year-old male with history of bipolar, schizoaffective, anxiety who presents today with suicidal ideation.  He is having multiple life stressors, primarily housing and financial stressors, that are increasing his  depression and causing him to have some passive suicidal thoughts.  He has enough lack of support that I am concerned, even though thoughts are passive, that he may progress to act on them.  Both IN the mental health  are concerned enough that we think he would benefit from admission.  He is currently awaiting bed placement.  He will be signed out to Dr. Cunningham.    Diagnosis:    ICD-10-CM    1. Depression, unspecified depression type F32.9    2. Suicidal ideation R45.851        Disposition:  Signed out to my partner, Dr. Cunningham, pending bed placement    Scribe Disclosure:  I, Presley Rose, am serving as a scribe on 2/4/2020 at 1:46 PM to personally document services performed by Adela Quesada based on my observations and the provider's statements to me.     Presley Rose  2/4/2020    EMERGENCY DEPARTMENT       Adela Mcbride MD  02/04/20 1454       Adela Mcbride MD  02/04/20 1458

## 2020-02-04 NOTE — ED PROVIDER NOTES
[facility] ED Behavioral Health Handoff Note:       Brief HPI:  This is a 49 year old male signed out to me by Dr. Mcbride .  See initial ED Provider note for details of the presentation.     Patient is medically cleared for admission to a Behavioral Health unit.          Hold Status:  Active Orders   Legal    Legal Status: BELINDA - Health Officer Authority to Detain     Frequency: Effective Now     Start Date/Time: 02/04/20 1413      Number of Occurrences: Until Specified         The patient has not required medication for agitation.      Exam:   Temp:  [97.4  F (36.3  C)] 97.4  F (36.3  C)  Pulse:  [95] 95  BP: (148)/(91) 148/91  SpO2:  [98 %] 98 %  Resting comfortably throughout the entirety of the evening shift, visualized on camera several times with no respiratory distress or acute injuries.    ED Course:    Medications   hydrOXYzine (ATARAX) tablet 25 mg (has no administration in time range)   Vitamin D3 (CHOLECALCIFEROL) 25 mcg (1000 units) tablet 2,000 Units (has no administration in time range)   lisinopril (PRINIVIL/ZESTRIL) tablet 20 mg (has no administration in time range)        I have performed an in person assessment of the patient. Based on this assessment the patient no longer requires a one on one attendant at this point in time.    Charly Cunningham MD  5:19 PM  February 4, 2020        There were no significant events while under my care.      Patient was signed out to the oncoming provider. Dr. Joyner      Impression:    ICD-10-CM    1. Depression, unspecified depression type F32.9 Drug abuse screen urine   2. Suicidal ideation R45.851        Plan:    1. Await Transfer to Mental Health Facility      RESULTS:   Results for orders placed or performed during the hospital encounter of 02/04/20 (from the past 24 hour(s))   Alcohol breath test POCT     Status: Normal    Collection Time: 02/04/20  2:33 PM   Result Value Ref Range    Alcohol Breath Test 0.00 0.00 - 0.01   Drug abuse screen urine     Status:  Abnormal    Collection Time: 02/04/20  3:16 PM   Result Value Ref Range    Amphetamine Qual Urine Positive (A) NEG^Negative    Barbiturates Qual Urine Negative NEG^Negative    Benzodiazepine Qual Urine Negative NEG^Negative    Cannabinoids Qual Urine Positive (A) NEG^Negative    Cocaine Qual Urine Negative NEG^Negative    Opiates Qualitative Urine Negative NEG^Negative    PCP Qual Urine Negative NEG^Negative             Charly Cunningham MD                     Trigger, Charly Quinteros MD  02/04/20 5233

## 2020-02-05 ENCOUNTER — HOSPITAL ENCOUNTER (INPATIENT)
Facility: CLINIC | Age: 50
LOS: 6 days | Discharge: HOME | DRG: 885 | End: 2020-02-11
Attending: PSYCHIATRY & NEUROLOGY | Admitting: PSYCHIATRY & NEUROLOGY
Payer: COMMERCIAL

## 2020-02-05 DIAGNOSIS — I15.8 OTHER SECONDARY HYPERTENSION: ICD-10-CM

## 2020-02-05 DIAGNOSIS — F25.0 SCHIZOAFFECTIVE DISORDER, BIPOLAR TYPE (H): ICD-10-CM

## 2020-02-05 DIAGNOSIS — E55.9 VITAMIN D DEFICIENCY: Primary | ICD-10-CM

## 2020-02-05 PROBLEM — R45.851 SUICIDAL IDEATION: Status: ACTIVE | Noted: 2018-05-02

## 2020-02-05 LAB
ALBUMIN SERPL-MCNC: 4 G/DL (ref 3.4–5)
ALP SERPL-CCNC: 76 U/L (ref 40–150)
ALT SERPL W P-5'-P-CCNC: 48 U/L (ref 0–70)
ANION GAP SERPL CALCULATED.3IONS-SCNC: 5 MMOL/L (ref 3–14)
AST SERPL W P-5'-P-CCNC: 30 U/L (ref 0–45)
BASOPHILS # BLD AUTO: 0 10E9/L (ref 0–0.2)
BASOPHILS NFR BLD AUTO: 0.3 %
BILIRUB SERPL-MCNC: 0.6 MG/DL (ref 0.2–1.3)
BUN SERPL-MCNC: 9 MG/DL (ref 7–30)
CALCIUM SERPL-MCNC: 9.1 MG/DL (ref 8.5–10.1)
CHLORIDE SERPL-SCNC: 105 MMOL/L (ref 94–109)
CO2 SERPL-SCNC: 27 MMOL/L (ref 20–32)
CREAT SERPL-MCNC: 0.6 MG/DL (ref 0.66–1.25)
DIFFERENTIAL METHOD BLD: NORMAL
EOSINOPHIL # BLD AUTO: 0.2 10E9/L (ref 0–0.7)
EOSINOPHIL NFR BLD AUTO: 2.6 %
ERYTHROCYTE [DISTWIDTH] IN BLOOD BY AUTOMATED COUNT: 12.2 % (ref 10–15)
GFR SERPL CREATININE-BSD FRML MDRD: >90 ML/MIN/{1.73_M2}
GLUCOSE SERPL-MCNC: 87 MG/DL (ref 70–99)
HCT VFR BLD AUTO: 40.4 % (ref 40–53)
HGB BLD-MCNC: 14.4 G/DL (ref 13.3–17.7)
IMM GRANULOCYTES # BLD: 0 10E9/L (ref 0–0.4)
IMM GRANULOCYTES NFR BLD: 0.1 %
LYMPHOCYTES # BLD AUTO: 2.2 10E9/L (ref 0.8–5.3)
LYMPHOCYTES NFR BLD AUTO: 29.4 %
MCH RBC QN AUTO: 29.9 PG (ref 26.5–33)
MCHC RBC AUTO-ENTMCNC: 35.6 G/DL (ref 31.5–36.5)
MCV RBC AUTO: 84 FL (ref 78–100)
MONOCYTES # BLD AUTO: 0.5 10E9/L (ref 0–1.3)
MONOCYTES NFR BLD AUTO: 6.2 %
NEUTROPHILS # BLD AUTO: 4.5 10E9/L (ref 1.6–8.3)
NEUTROPHILS NFR BLD AUTO: 61.4 %
NRBC # BLD AUTO: 0 10*3/UL
NRBC BLD AUTO-RTO: 0 /100
PLATELET # BLD AUTO: 210 10E9/L (ref 150–450)
POTASSIUM SERPL-SCNC: 4 MMOL/L (ref 3.4–5.3)
PROT SERPL-MCNC: 6.8 G/DL (ref 6.8–8.8)
RBC # BLD AUTO: 4.81 10E12/L (ref 4.4–5.9)
SODIUM SERPL-SCNC: 137 MMOL/L (ref 133–144)
VALPROATE SERPL-MCNC: 5 MG/L (ref 50–100)
WBC # BLD AUTO: 7.4 10E9/L (ref 4–11)

## 2020-02-05 PROCEDURE — 25000132 ZZH RX MED GY IP 250 OP 250 PS 637: Performed by: PSYCHIATRY & NEUROLOGY

## 2020-02-05 PROCEDURE — 36415 COLL VENOUS BLD VENIPUNCTURE: CPT | Performed by: PSYCHIATRY & NEUROLOGY

## 2020-02-05 PROCEDURE — 99222 1ST HOSP IP/OBS MODERATE 55: CPT | Mod: AI | Performed by: PSYCHIATRY & NEUROLOGY

## 2020-02-05 PROCEDURE — 80164 ASSAY DIPROPYLACETIC ACD TOT: CPT | Performed by: PSYCHIATRY & NEUROLOGY

## 2020-02-05 PROCEDURE — 85025 COMPLETE CBC W/AUTO DIFF WBC: CPT | Performed by: PSYCHIATRY & NEUROLOGY

## 2020-02-05 PROCEDURE — 12400001 ZZH R&B MH UMMC

## 2020-02-05 PROCEDURE — 80053 COMPREHEN METABOLIC PANEL: CPT | Performed by: PSYCHIATRY & NEUROLOGY

## 2020-02-05 RX ORDER — DIVALPROEX SODIUM 500 MG/1
1000 TABLET, EXTENDED RELEASE ORAL AT BEDTIME
Status: DISCONTINUED | OUTPATIENT
Start: 2020-02-05 | End: 2020-02-11 | Stop reason: HOSPADM

## 2020-02-05 RX ORDER — OLANZAPINE 10 MG/2ML
10 INJECTION, POWDER, FOR SOLUTION INTRAMUSCULAR
Status: DISCONTINUED | OUTPATIENT
Start: 2020-02-05 | End: 2020-02-11 | Stop reason: HOSPADM

## 2020-02-05 RX ORDER — MIRTAZAPINE 15 MG/1
15 TABLET, FILM COATED ORAL AT BEDTIME
Status: DISCONTINUED | OUTPATIENT
Start: 2020-02-05 | End: 2020-02-11 | Stop reason: HOSPADM

## 2020-02-05 RX ORDER — BISACODYL 10 MG
10 SUPPOSITORY, RECTAL RECTAL DAILY PRN
Status: DISCONTINUED | OUTPATIENT
Start: 2020-02-05 | End: 2020-02-11 | Stop reason: HOSPADM

## 2020-02-05 RX ORDER — OLANZAPINE 10 MG/1
10 TABLET ORAL
Status: DISCONTINUED | OUTPATIENT
Start: 2020-02-05 | End: 2020-02-11 | Stop reason: HOSPADM

## 2020-02-05 RX ORDER — ALUMINA, MAGNESIA, AND SIMETHICONE 2400; 2400; 240 MG/30ML; MG/30ML; MG/30ML
30 SUSPENSION ORAL EVERY 4 HOURS PRN
Status: DISCONTINUED | OUTPATIENT
Start: 2020-02-05 | End: 2020-02-11 | Stop reason: HOSPADM

## 2020-02-05 RX ORDER — NICOTINE 21 MG/24HR
1 PATCH, TRANSDERMAL 24 HOURS TRANSDERMAL DAILY
Status: DISCONTINUED | OUTPATIENT
Start: 2020-02-05 | End: 2020-02-11 | Stop reason: HOSPADM

## 2020-02-05 RX ORDER — ACETAMINOPHEN 325 MG/1
650 TABLET ORAL EVERY 4 HOURS PRN
Status: DISCONTINUED | OUTPATIENT
Start: 2020-02-05 | End: 2020-02-11 | Stop reason: HOSPADM

## 2020-02-05 RX ORDER — LISINOPRIL 10 MG/1
20 TABLET ORAL DAILY
Status: DISCONTINUED | OUTPATIENT
Start: 2020-02-05 | End: 2020-02-11 | Stop reason: HOSPADM

## 2020-02-05 RX ORDER — TRAZODONE HYDROCHLORIDE 50 MG/1
50 TABLET, FILM COATED ORAL
Status: DISCONTINUED | OUTPATIENT
Start: 2020-02-05 | End: 2020-02-11 | Stop reason: HOSPADM

## 2020-02-05 RX ORDER — HYDROXYZINE HYDROCHLORIDE 25 MG/1
25 TABLET, FILM COATED ORAL EVERY 4 HOURS PRN
Status: DISCONTINUED | OUTPATIENT
Start: 2020-02-05 | End: 2020-02-11 | Stop reason: HOSPADM

## 2020-02-05 RX ADMIN — NICOTINE 1 PATCH: 14 PATCH, EXTENDED RELEASE TRANSDERMAL at 09:10

## 2020-02-05 RX ADMIN — Medication 3 MG: at 21:00

## 2020-02-05 RX ADMIN — DIVALPROEX SODIUM 1000 MG: 500 TABLET, EXTENDED RELEASE ORAL at 21:01

## 2020-02-05 RX ADMIN — MIRTAZAPINE 15 MG: 15 TABLET, FILM COATED ORAL at 21:01

## 2020-02-05 RX ADMIN — LISINOPRIL 20 MG: 10 TABLET ORAL at 17:17

## 2020-02-05 ASSESSMENT — ACTIVITIES OF DAILY LIVING (ADL)
HYGIENE/GROOMING: INDEPENDENT
TOILETING: 0-->INDEPENDENT
COGNITION: 0 - NO COGNITION ISSUES REPORTED
DRESS: 0-->INDEPENDENT
ORAL_HYGIENE: INDEPENDENT
DRESS: SCRUBS (BEHAVIORAL HEALTH);INDEPENDENT
DRESS: SCRUBS (BEHAVIORAL HEALTH);INDEPENDENT
LAUNDRY: UNABLE TO COMPLETE
LAUNDRY: WITH SUPERVISION
FALL_HISTORY_WITHIN_LAST_SIX_MONTHS: NO
RETIRED_EATING: 0-->INDEPENDENT
RETIRED_COMMUNICATION: 0-->UNDERSTANDS/COMMUNICATES WITHOUT DIFFICULTY
AMBULATION: 0-->INDEPENDENT
ORAL_HYGIENE: INDEPENDENT
HYGIENE/GROOMING: INDEPENDENT
TRANSFERRING: 0-->INDEPENDENT
SWALLOWING: 0-->SWALLOWS FOODS/LIQUIDS WITHOUT DIFFICULTY
BATHING: 0-->INDEPENDENT

## 2020-02-05 ASSESSMENT — MIFFLIN-ST. JEOR: SCORE: 1953.09

## 2020-02-05 NOTE — ED NOTES
Pt continues resting but arouses easily. Updated on room being available for him at [facility] and he continues to be agreeable to transfer and admission.

## 2020-02-05 NOTE — PLAN OF CARE
"S: Nabor Mendoza 49/M voluntary admitted to [facility] for suicidal ideation with plan to shoot or stab himself. Pt chose to come into ED instead of purchasing a gun. Currently no access to a gun.    Dx: Schizoaffective disorder, bipolar I; lower intelligence; HTN  Stressors: Business failing, financial struggles, may be evicted    B: Multiple admissions for mental health and CD. Pt however only stated known admission in June 2018. 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 Brother completed suicide by hanging self when pt was 14.     A: Cooperative with search and interview. Redirection needed for pt to keep voice down. Disheveled. Current SI. No HI, AH, SIB. Pt has history of throwing things when angry or frustrated. Pt states \"it's never been a problem.\" Appears to be a poor historian or is not as forthcoming. Pt admitted THC use (once a month), but UTOX positive for amphetamines and THC. Pt has minor visual impairment but can't afford contacts/glasses.    R: Suicide and Assault precautions. Pt received last Invega shot on 1/24/2020. On-call did not order scheduled medication.  "

## 2020-02-05 NOTE — PHARMACY-ADMISSION MEDICATION HISTORY
Please see Admission Medication History note completed on 2/4/2020 under previous encounter at [facility] for information regarding prior to admission medications. Of note, Invega Sustenna 156 mg IM injection last administered on 1/24/2020 per [facility] Everywhere notes (not 1/27/2020 as previously listed).    Bin Neff, PharmD  [facility]: Ascom *[phone]

## 2020-02-05 NOTE — PLAN OF CARE
BEHAVIORAL TEAM DISCUSSION    Participants: Dr. Tasha Infante, Dionna Bustamante RN, Alicia Stringer OT, Neda Walsh CTC    Progress: Day 1. Voluntary. Pt admitted early in the morning to [facility]. Pt presented to the ED with SI due to significant life stressors including bankruptcy, losing housing, and losing job.     Anticipated length of stay: 1 week    Continued Stay Criteria/Rationale: Pt is currently reporting a high level of depression and anxiety. He needs to stabilize on medications and be evaluated for SI/SIB before he is safe to discharge to the community again.     Medical/Physical: None    Precautions:   Behavioral Orders   Procedures    Assault precautions    Code 1 - Restrict to Unit    Routine Programming     As clinically indicated    Status 15     Every 15 minutes.    Suicide precautions     Patients on Suicide Precautions should have a Combination Diet ordered that includes a Diet selection(s) AND a Behavioral Tray selection for Safe Tray - with utensils, or Safe Tray - NO utensils       Plan: Patient has been admitted to the psychiatric unit for stabilization.  Patient will be seen and assessed by psychiatric doctor.  Medication changes will be reviewed and monitored.  Groups will be offered to assist patient with increasing knowledge, strategies, and copping techniques to help manage mental health.  CTC will meet with patient to provide individualized mental health resources and support throughout patient's hospitalization.  CTC will assist with developing a Care Plan and after care appointments.    Rationale for change in precautions or plan: No changes

## 2020-02-05 NOTE — PROGRESS NOTES
"Initial Psychosocial Assessment    I have reviewed the chart, met with the patient, and developed Care Plan.  Information for assessment was obtained from:     Patient and Chart Review     Presenting Problem:  Pt admitted to  30 for SI with plan to shoot or stab himself. Instead of going to access a gun pt went to the ED; currently no access to guns. Pt identifies many stressors currently including loss of housing and losing job. Pt worries about finances and where he will live. During interview pt kept stating he was \"hopeless\" and \"just want to die.\"     History of Mental Health and Chemical Dependency:  Currently sees a psychiatrist at [facility] and gets monthly invega injections  Hx ddx: Bipolar affective disorder, mixed; schizoaffective disorder; schizophrenia; cannabis use disorder     CD Treatment completed in 2011    [facility] Hx: [facility]   01/03/2018: Stay of [facility]   05/03/2018: Committed - Mentally Ill  10/22/2018Continued Commitment - Mentally Ill   9/4/2014- Stay of [facility]         8/31/2004- Stay of [facility]     Hospital Hx:   6/22/18- [facility]- presented due to anxiety and life stressors (housing) with passive SI  5/2/18-6/15/18- [facility]- presented voluntarily for SI   3/23/18- [facility]- presented voluntarily due to drinking and cannabis use, requested hospitalization which was denied   12/24/17-1/5/18- [facility] inpatient- Dx was mood disorder. Admitted from [facility] due to paranoia, delisional, disorganized and elevated mood, substance use  11/9/17-11/14/17- [facility]- Bipolar disorder, manic with [id number]    Family Description (Constellation, Family Psychiatric History):  Pt has 2 sisters whom he believe \"hates me.\" His parents passed away many years ago. He does not endorse a family Hx of mental illness. 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     Significant Life Events (Illness, Abuse, Trauma, " Death):  Brother completed suicide by hanging when pt was 14. Pt is experiencing current financial struggles, potential eviction and unemployment. Pt is also facing unemployment which will cause a significant financial strain for him.     Living Situation:  Lives independently in an apartment in [place] however pt is facing eviction and imminent homelessness.     Educational Background:  Pt graduated high school and attended some college.     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     Financial Status:  Currently facing bankruptcy. Pt's MA is currently active. Pt is concerned about finances at the moment. He is going to be unemployed soon and even though he might be able to get unemployment benefits it won't be enough for him to live off. He does not currently receive any financial assistance or benefits.     Legal Issues:  Ha a Hx of felony drug charges in [facility] along with DUI's.     Ethnic/Cultural Issues:  None    Spiritual Orientation:  Mosque, however pt is questioning why God would allow this to happen to him.      Service History:  None    Social Functioning (organization, interests):  Pt is preoccupied with his current life situation and unable to identify any interests. Pt appears to have difficulty with personal hygiene: WR observed neglected foot care with overgrown toenails and an overall presentation of uncleanliness. Pt has been refusing to leave bed since arriving to [facility].      Current Treatment Providers are:   Herman Torrez MD    Psychiatry    [address] [place] [id number]         Phone: [phone]    Fax: [phone]       Social Service Assessment/Plan:  Patient will have psychiatric assessment and medication management by the psychiatrist. Medications will be reviewed and adjusted per MD as indicated. The treatment team will continue to assess and  stabilize the patient's mental health symptoms with the use of medications and therapeutic programming. Hospital staff will provide a safe environment and a therapeutic milieu. Staff will continue to assess patient as needed. Patient will participate in unit groups and activities. Patient will receive individual and group support on the unit.     CTC will do individual inpatient treatment planning and after care planning. CTC will discuss options for increasing community supports with the patient. CTC will coordinate with outpatient providers and will place referrals to ensure appropriate follow up care is in place.

## 2020-02-05 NOTE — PROGRESS NOTES
02/05/20 0236   Patient Belongings   Did you bring any home meds/supplements to the hospital?  No   Patient Belongings locker   Patient Belongings Put in Hospital Secure Location (Security or Locker, etc.) cell phone/electronics;clothing;keys;shoes   Belongings Search Yes   Clothing Search Yes   Second Staff Boaz DARLING RN   Comment Envelope # [id number] sent to security     Items kept in storage  Winter Coat, Shirt with strings, Cell phone with , Shoes, Belt, Set of keys, Hat, $.63 (sixty three cents in coins).    Items sent to security  [place] ID, $ 27.00 ( twenty seven dollars).    Admission Signature    Patient Signature    ________________    Staff Signature    ________________    2nd Staff if pt can't sign    _________________    Discharge Signature    All my personal items were returned to me    Patient Signature    ________________    Staff Signature    __________________   Yes
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
## 2020-02-05 NOTE — PROGRESS NOTES
"Pt was in bed most of the shift.  He checked in with staff + was pleasant. Pt has passing SI thoughts, \"I don't know.  I might take a hand full of pills or do something else.  I don't know.  My whole life is falling apart.\"  Pt confirms anx 6 of 10, dep 9 of 10, confirms passive SI.  Pt would not explain how or why his \"life is falling apart.\"  "

## 2020-02-06 LAB
CHOLEST SERPL-MCNC: 168 MG/DL
DEPRECATED CALCIDIOL+CALCIFEROL SERPL-MC: 15 UG/L (ref 20–75)
HDLC SERPL-MCNC: 27 MG/DL
LDLC SERPL CALC-MCNC: 109 MG/DL
NONHDLC SERPL-MCNC: 141 MG/DL
TRIGL SERPL-MCNC: 162 MG/DL
TSH SERPL DL<=0.005 MIU/L-ACNC: 1.69 MU/L (ref 0.4–4)

## 2020-02-06 PROCEDURE — 25000132 ZZH RX MED GY IP 250 OP 250 PS 637: Performed by: PSYCHIATRY & NEUROLOGY

## 2020-02-06 PROCEDURE — 12400001 ZZH R&B MH UMMC

## 2020-02-06 PROCEDURE — 82306 VITAMIN D 25 HYDROXY: CPT | Performed by: PSYCHIATRY & NEUROLOGY

## 2020-02-06 PROCEDURE — 84443 ASSAY THYROID STIM HORMONE: CPT | Performed by: PSYCHIATRY & NEUROLOGY

## 2020-02-06 PROCEDURE — 80061 LIPID PANEL: CPT | Performed by: PSYCHIATRY & NEUROLOGY

## 2020-02-06 PROCEDURE — 99232 SBSQ HOSP IP/OBS MODERATE 35: CPT | Performed by: PSYCHIATRY & NEUROLOGY

## 2020-02-06 PROCEDURE — 36415 COLL VENOUS BLD VENIPUNCTURE: CPT | Performed by: PSYCHIATRY & NEUROLOGY

## 2020-02-06 RX ORDER — CHOLECALCIFEROL (VITAMIN D3) 1250 MCG
50000 CAPSULE ORAL
Status: DISCONTINUED | OUTPATIENT
Start: 2020-02-07 | End: 2020-02-11 | Stop reason: HOSPADM

## 2020-02-06 RX ADMIN — DIVALPROEX SODIUM 1000 MG: 500 TABLET, EXTENDED RELEASE ORAL at 20:48

## 2020-02-06 RX ADMIN — NICOTINE 1 PATCH: 14 PATCH, EXTENDED RELEASE TRANSDERMAL at 14:38

## 2020-02-06 RX ADMIN — LISINOPRIL 20 MG: 10 TABLET ORAL at 09:06

## 2020-02-06 RX ADMIN — MIRTAZAPINE 15 MG: 15 TABLET, FILM COATED ORAL at 20:49

## 2020-02-06 RX ADMIN — Medication 3 MG: at 20:48

## 2020-02-06 ASSESSMENT — ACTIVITIES OF DAILY LIVING (ADL)
ORAL_HYGIENE: INDEPENDENT
HYGIENE/GROOMING: INDEPENDENT
DRESS: INDEPENDENT

## 2020-02-06 NOTE — PROGRESS NOTES
02/06/20 1355   Behavioral Health   Hallucinations denies / not responding to hallucinations   Thinking poor concentration   Orientation place: oriented;person: oriented;date: oriented;time: oriented   Memory baseline memory   Insight admits / accepts   Judgement impaired   Eye Contact at examiner   Affect blunted, flat   Mood depressed   Physical Appearance/Attire disheveled   Hygiene neglected grooming - unclean body, hair, teeth   Suicidality thoughts only   1. Wish to be Dead (Recent) Yes   2. Non-Specific Active Suicidal Thoughts (Recent) No   Elopement   (no concerns)   Activity withdrawn;isolative   Speech coherent;clear   Medication Sensitivity no stated side effects     Pt was isolative to his room. Mood was calm with blunted affect. Endorses feeling very depressed and having passive suicidal thoughts. Denies psychotic symptoms. Hygiene appears neglected. Poor appetite (no breakfast or lunch). Pt reported not sleeping well either.

## 2020-02-06 NOTE — PHARMACY-CONSULT NOTE
Pharmacy consult placed by Dr. Tasha Infante to evaluate the need for paliperidone sustained release injection (Invega Sustenna) during current hospitalization.    The following conditions must be met to dispense Invega Sustenna:   1. Patient has been on Invega Sustenna at least 3 weeks prior to admission.   2. Patient has received both initiation doses.   3. Dose will be administered one week after the next scheduled outpatient maintenance dose is due.   4. The patient must still be hospitalized on the administration date determined by the pharmacy consult (i.e., one week after the next scheduled outpatient dose).   5. Order must include  dispense as written.    6. If six months or more have passed since the last maintenance dose, do not restart Invega Sustenna in the hospital, defer to outpatient treatment.     The criteria listed above allow for continuation of Invega Sustenna while this patient is hospitalized. The last dose of Invega Sustenna 156 mg [facility]2020 [facility] The next dose is due on 2/21/2020, and will be scheduled for 2/28/2020 (one week after the next scheduled outpatient dose) as listed above in condition #3.       Please contact pharmacy with any questions.    Bin Neff, Charanjit  [facility]: Ascom *[phone]

## 2020-02-06 NOTE — PROGRESS NOTES
[facility], [place]   Psychiatric Progress Note  Hospital Day: 1        Interim History:   The patient's care was discussed with the treatment team during the daily team meeting and/or staff's chart notes were reviewed.  Staff report patient isolated to room majority of evening, taking medications as prescribed, no acute events overnight.     Upon interview, the patient reports that he still is having a few passive suicidal thoughts today.  He reports he still depressed and his mood has not improved since admission.  He denies having any side effects from his medications.  He denies having any symptoms of psychosis including hallucinations or paranoia.  He denies having any physical health concerns.  Discussed that team continues to explore possibilities for discharge plan and patient is agreeable to referral to IRTS or CD treatment.  No additional concerns at this time.         Medications:       divalproex sodium extended-release  1,000 mg Oral At Bedtime     lisinopril  20 mg Oral Daily     melatonin  3 mg Oral QPM     mirtazapine  15 mg Oral At Bedtime     nicotine  1 patch Transdermal Daily     nicotine   Transdermal Q8H     [START ON 2/21/2020] paliperidone  156 mg Intramuscular Once          Allergies:   No Known Allergies       Labs:     Recent Results (from the past 48 hour(s))   Alcohol breath test POCT    Collection Time: 02/04/20  2:33 PM   Result Value Ref Range    Alcohol Breath Test 0.00 0.00 - 0.01   Drug abuse screen urine    Collection Time: 02/04/20  3:16 PM   Result Value Ref Range    Amphetamine Qual Urine Positive (A) NEG^Negative    Barbiturates Qual Urine Negative NEG^Negative    Benzodiazepine Qual Urine Negative NEG^Negative    Cannabinoids Qual Urine Positive (A) NEG^Negative    Cocaine Qual Urine Negative NEG^Negative    Opiates Qualitative Urine Negative NEG^Negative    PCP Qual Urine Negative NEG^Negative   Comprehensive metabolic panel    Collection Time:  02/05/20  1:39 PM   Result Value Ref Range    Sodium 137 133 - 144 mmol/L    Potassium 4.0 3.4 - 5.3 mmol/L    Chloride 105 94 - 109 mmol/L    Carbon Dioxide 27 20 - 32 mmol/L    Anion Gap 5 3 - 14 mmol/L    Glucose 87 70 - 99 mg/dL    Urea Nitrogen 9 7 - 30 mg/dL    Creatinine 0.60 (L) 0.66 - 1.25 mg/dL    GFR Estimate >90 >60 mL/min/[1.73_m2]    GFR Estimate If Black >90 >60 mL/min/[1.73_m2]    Calcium 9.1 8.5 - 10.1 mg/dL    Bilirubin Total 0.6 0.2 - 1.3 mg/dL    Albumin 4.0 3.4 - 5.0 g/dL    Protein Total 6.8 6.8 - 8.8 g/dL    Alkaline Phosphatase 76 40 - 150 U/L    ALT 48 0 - 70 U/L    AST 30 0 - 45 U/L   CBC with platelets differential    Collection Time: 02/05/20  1:39 PM   Result Value Ref Range    WBC 7.4 4.0 - 11.0 10e9/L    RBC Count 4.81 4.4 - 5.9 10e12/L    Hemoglobin 14.4 13.3 - 17.7 g/dL    Hematocrit 40.4 40.0 - 53.0 %    MCV 84 78 - 100 fl    MCH 29.9 26.5 - 33.0 pg    MCHC 35.6 31.5 - 36.5 g/dL    RDW 12.2 10.0 - 15.0 %    Platelet Count 210 150 - 450 10e9/L    Diff Method Automated Method     % Neutrophils 61.4 %    % Lymphocytes 29.4 %    % Monocytes 6.2 %    % Eosinophils 2.6 %    % Basophils 0.3 %    % Immature Granulocytes 0.1 %    Nucleated RBCs 0 0 /100    Absolute Neutrophil 4.5 1.6 - 8.3 10e9/L    Absolute Lymphocytes 2.2 0.8 - 5.3 10e9/L    Absolute Monocytes 0.5 0.0 - 1.3 10e9/L    Absolute Eosinophils 0.2 0.0 - 0.7 10e9/L    Absolute Basophils 0.0 0.0 - 0.2 10e9/L    Abs Immature Granulocytes 0.0 0 - 0.4 10e9/L    Absolute Nucleated RBC 0.0    Valproic acid    Collection Time: 02/05/20  1:39 PM   Result Value Ref Range    Valproic Acid Level 5 (L) 50 - 100 mg/L          Psychiatric Examination:     BP (!) 151/103   Pulse 80   Temp 98.2  F (36.8  C) (Oral)   Resp 18   Ht 1.829 m (6')   Wt 104.5 kg (230 lb 6.4 oz)   SpO2 95%   BMI 31.25 kg/m    Weight is 230 lbs 6.4 oz  Body mass index is 31.25 kg/m .    Orthostatic Vitals       Most Recent      Standing Orthostatic /77 02/05  0849    Standing Orthostatic Pulse (bpm) 113 02/05 0849          Appearance: awake, alert, dressed in hospital scrubs, appeared as age stated and unkempt  Attitude:  cooperative  Eye Contact:  good  Mood:  depressed  Affect:  mood congruent and intensity is blunted  Speech:  clear, coherent  Language: fluent and intact in English  Psychomotor, Gait, Musculoskeletal:  no evidence of tardive dyskinesia, dystonia, or tics  Throught Process:  linear and goal oriented  Associations:  no loose associations  Thought Content:  no evidence of psychotic thought and passive suicidal ideation present  Insight:  limited  Judgement:  limited  Oriented to:  time, person, and place  Attention Span and Concentration:  intact  Recent and Remote Memory:  intact  Fund of Knowledge:  low-normal    Clinical Global Impressions  First:  Considering your total clinical experience with this particular patient population, how severe are the patient's symptoms at this time?: 7 (02/06/20 0730)  Compared to the patient's condition at the START of treatment, this patient's condition is:: 4 (02/06/20 0730)  Most recent:  Considering your total clinical experience with this particular patient population, how severe are the patient's symptoms at this time?: 7 (02/06/20 0730)  Compared to the patient's condition at the START of treatment, this patient's condition is:: 4 (02/06/20 0730)           Precautions:     Behavioral Orders   Procedures     Assault precautions     Code 1 - Restrict to Unit     Routine Programming     As clinically indicated     Status 15     Every 15 minutes.     Suicide precautions     Patients on Suicide Precautions should have a Combination Diet ordered that includes a Diet selection(s) AND a Behavioral Tray selection for Safe Tray - with utensils, or Safe Tray - NO utensils            Diagnoses:   Bipolar disorder type 1, current episode depressed  Alcohol use disorder, moderate  Methamphetamine use disorder, moderate          Assessment & Plan:   Assessment and hospital summary:  This patient is a 49 year old male with history of BPAD and polysubstance use who presented to ED with suicidal ideation in context of increased stressors and medication non-adherence. He is agreeable to restarting Depakote and mirtazapine to further target mood and continue PTA invega sustenna. Will get LFTs and CBC and VPA level before restarting Depakote. Patient would likely benefit from IRTS vs MICD treatment upon discharge.  Due to patient reporting significant depression symptoms he was restarted on Depakote and mirtazapine on admission to target mood.     Inpatient psychiatric hospitalization is warranted at this time for safety, stabilization, and possible adjustment in medications.    Psychiatric treatment/inteventions:  Medications:   -Continue PTA Depakote ER at 1000mg at bedtime, level ordered for Monday   -continue PTA mirtazapine 15 mg at bedtime  -continue PTA melatonin 3 mg at 7pm  -continue PTA Invega Sustenna, inject 156 mg IM q. month (last injection 1/24/20, next due 2/21/20)     Laboratory/Imaging: CMP notable for creatinine 0.60 (L) otherwise WNL, CBC WNL and VPA level 5 (L); lipid panel notable for HDL 27 (L),  (H), non- (H), triglycerides 162 (H), total cholesterol 168; TSH 1.69; vitamin D 15 (L);  Utox positive for methamphetamines and cannabinoids     Patient will be treated in therapeutic milieu with appropriate individual and group therapies as described.     Medical treatment/interventions:  Medical concerns:   1) Vit D def  -pt was prescribed 2000U daily PTA but not taking, will replace with 26541L weekly x7 weeks  2) Patient denies any current acute or chronic medical concerns aside from taking lisinopril for HTN which will be continued     Plan: as above, continue to monitor  Consults: None     Legal Status: Voluntary     Safety Assessment:   Checks: Status 15  Pt has not required locked seclusion or restraints in  the past 24 hours to maintain safety, please refer to RN documentation for further details.    The risks, benefits, alternatives and side effects have been discussed and are understood by the patient.     Disposition: Pending clinical stabilization. Will likely discharge to IRTS vs. CD treatment when stable.     This note was created by judy using a Dragon dictation system. All typing errors or contextual distortion are unintentional and software inherent.      Tasha Infante,   [facility] Psychiatry

## 2020-02-06 NOTE — PLAN OF CARE
"The patient and/or their representative will achieve their patient-specific goals related to the plan of care.    The patient-specific goals include:       \"Reasons you are in the hospital;\" The patient identifies the following reasons for current hospitalization:   No place to go  Depressed and suicidal    \"Goals for Discharge\" The patient identifies the following goals for discharge:     Go to an IRTS     Problem: Adult Behavioral Health Plan of Care  Goal: Patient-Specific Goal (Individualization)  Flowsheets (Taken 2/6/2020 1224)  Patient Personal Strengths: expressive of emotions; insight into illness/situation; positive educational history; self-awareness  Patient Vulnerabilities: Pt had SI with plan and intent before arriving; Pt identifies that his family is not supportive of him; Pt is facing loss of housing and job         "

## 2020-02-06 NOTE — PLAN OF CARE
Illness Management Recovery model: Personal Plan of Care    Patient completed Personal Plan of Care, identifying reasons for hospitalization and goals for discharge. Form reviewed in team meeting and signed by patient, physician, writer.   Form given to HUC to be scanned into [employer].

## 2020-02-06 NOTE — PROGRESS NOTES
"Nabor stayed in his bedroom for the entire evening resting. Pt only came out of his room to get his dinner. Staff attempted to check in with Pt, but Pt refused and only stated \"I don't need anything\". Writer was unable to assess for SI/SIB. Pt was med compliant and otherwise cooperative.     02/05/20 2100   Behavioral Health   Hallucinations denies / not responding to hallucinations   Thinking distractable   Orientation other (see comment)  (SUKUMAR)   Memory other (see comment)  (SUKUMAR)   Insight poor   Judgement impaired   Eye Contact at examiner   Affect blunted, flat   Mood mood is calm;other (see comments)  (Quiet)   Physical Appearance/Attire disheveled   Hygiene neglected grooming - unclean body, hair, teeth   Suicidality other (see comments)  (SUKUMAR)   1. Wish to be Dead (Recent)   (SUKUMAR)   2. Non-Specific Active Suicidal Thoughts (Recent)   (SUKUMAR)   Self Injury other (see comment)  (SUKUMAR)   Elopement   (No observed behaviors/statements of concern)   Activity withdrawn;isolative   Speech clear;coherent   Medication Sensitivity no stated side effects   Psychomotor / Gait balanced;steady   Psycho Education   Type of Intervention 1:1 intervention   Response refuses   Hours 0.5   Treatment Detail Check in   Activities of Daily Living   Hygiene/Grooming independent   Oral Hygiene independent   Dress scrubs (behavioral health);independent   Laundry with supervision   Room Organization independent     "

## 2020-02-07 PROCEDURE — 25000132 ZZH RX MED GY IP 250 OP 250 PS 637: Performed by: PSYCHIATRY & NEUROLOGY

## 2020-02-07 PROCEDURE — 99232 SBSQ HOSP IP/OBS MODERATE 35: CPT | Performed by: PSYCHIATRY & NEUROLOGY

## 2020-02-07 PROCEDURE — 12400001 ZZH R&B MH UMMC

## 2020-02-07 RX ADMIN — MIRTAZAPINE 15 MG: 15 TABLET, FILM COATED ORAL at 22:38

## 2020-02-07 RX ADMIN — NICOTINE 1 PATCH: 14 PATCH, EXTENDED RELEASE TRANSDERMAL at 08:37

## 2020-02-07 RX ADMIN — LISINOPRIL 20 MG: 10 TABLET ORAL at 08:37

## 2020-02-07 RX ADMIN — DIVALPROEX SODIUM 1000 MG: 500 TABLET, EXTENDED RELEASE ORAL at 22:38

## 2020-02-07 RX ADMIN — CHOLECALCIFEROL CAP 1.25 MG (50000 UNIT) 50000 UNITS: 1.25 CAP at 08:37

## 2020-02-07 RX ADMIN — Medication 3 MG: at 22:38

## 2020-02-07 ASSESSMENT — ACTIVITIES OF DAILY LIVING (ADL)
DRESS: SCRUBS (BEHAVIORAL HEALTH)
ORAL_HYGIENE: INDEPENDENT
LAUNDRY: WITH SUPERVISION
HYGIENE/GROOMING: INDEPENDENT

## 2020-02-07 NOTE — PROGRESS NOTES
02/06/20 2200   Significant Event   Significant Event Other (see comments)  (shift summary)     Pt was isolative and withdrawn the majority of the evening, sleeping/napping while in his room, came out in the milieu for dinner, then went directly back to his room.  Calm, cooperative, flat/blunted affect, depressed,no stated SI, SIB or hallucinations..

## 2020-02-07 NOTE — PROGRESS NOTES
[facility], [place]   Psychiatric Progress Note  Hospital Day: 2        Interim History:   The patient's care was discussed with the treatment team during the daily team meeting and/or staff's chart notes were reviewed.  Staff report patient continues to isolate in his rooms, taking medications as prescribed, no acute events overnight.     Upon interview, the patient reports that he heard from his sister and she paid his rent so he will be infected.  He reports this is improved his mood and he has not had any suicidal ideation today.  He denies any symptoms of psychosis.  He continues to tolerate the medications without side effects.  He reports he is sleeping better.  Discussed the plan to observe him over the weekend and ensure that suicidal ideation does not return and he continues to tolerate the medications and have lab level drawn on Monday and then possibly discharge after that.  He is in agreement with this plan.  No additional concerns at this time.         Medications:       cholecalciferol  50,000 Units Oral Q7 Days     divalproex sodium extended-release  1,000 mg Oral At Bedtime     lisinopril  20 mg Oral Daily     melatonin  3 mg Oral QPM     mirtazapine  15 mg Oral At Bedtime     nicotine  1 patch Transdermal Daily     nicotine   Transdermal Q8H     [START ON 2/28/2020] paliperidone  156 mg Intramuscular Once          Allergies:   No Known Allergies       Labs:     Recent Results (from the past 48 hour(s))   Comprehensive metabolic panel    Collection Time: 02/05/20  1:39 PM   Result Value Ref Range    Sodium 137 133 - 144 mmol/L    Potassium 4.0 3.4 - 5.3 mmol/L    Chloride 105 94 - 109 mmol/L    Carbon Dioxide 27 20 - 32 mmol/L    Anion Gap 5 3 - 14 mmol/L    Glucose 87 70 - 99 mg/dL    Urea Nitrogen 9 7 - 30 mg/dL    Creatinine 0.60 (L) 0.66 - 1.25 mg/dL    GFR Estimate >90 >60 mL/min/[1.73_m2]    GFR Estimate If Black >90 >60 mL/min/[1.73_m2]    Calcium 9.1 8.5 - 10.1  mg/dL    Bilirubin Total 0.6 0.2 - 1.3 mg/dL    Albumin 4.0 3.4 - 5.0 g/dL    Protein Total 6.8 6.8 - 8.8 g/dL    Alkaline Phosphatase 76 40 - 150 U/L    ALT 48 0 - 70 U/L    AST 30 0 - 45 U/L   CBC with platelets differential    Collection Time: 02/05/20  1:39 PM   Result Value Ref Range    WBC 7.4 4.0 - 11.0 10e9/L    RBC Count 4.81 4.4 - 5.9 10e12/L    Hemoglobin 14.4 13.3 - 17.7 g/dL    Hematocrit 40.4 40.0 - 53.0 %    MCV 84 78 - 100 fl    MCH 29.9 26.5 - 33.0 pg    MCHC 35.6 31.5 - 36.5 g/dL    RDW 12.2 10.0 - 15.0 %    Platelet Count 210 150 - 450 10e9/L    Diff Method Automated Method     % Neutrophils 61.4 %    % Lymphocytes 29.4 %    % Monocytes 6.2 %    % Eosinophils 2.6 %    % Basophils 0.3 %    % Immature Granulocytes 0.1 %    Nucleated RBCs 0 0 /100    Absolute Neutrophil 4.5 1.6 - 8.3 10e9/L    Absolute Lymphocytes 2.2 0.8 - 5.3 10e9/L    Absolute Monocytes 0.5 0.0 - 1.3 10e9/L    Absolute Eosinophils 0.2 0.0 - 0.7 10e9/L    Absolute Basophils 0.0 0.0 - 0.2 10e9/L    Abs Immature Granulocytes 0.0 0 - 0.4 10e9/L    Absolute Nucleated RBC 0.0    Valproic acid    Collection Time: 02/05/20  1:39 PM   Result Value Ref Range    Valproic Acid Level 5 (L) 50 - 100 mg/L   TSH with free T4 reflex and/or T3 as indicated    Collection Time: 02/06/20  7:43 AM   Result Value Ref Range    TSH 1.69 0.40 - 4.00 mU/L   Lipid panel    Collection Time: 02/06/20  7:43 AM   Result Value Ref Range    Cholesterol 168 <200 mg/dL    Triglycerides 162 (H) <150 mg/dL    HDL Cholesterol 27 (L) >39 mg/dL    LDL Cholesterol Calculated 109 (H) <100 mg/dL    Non HDL Cholesterol 141 (H) <130 mg/dL   Vitamin D    Collection Time: 02/06/20  7:43 AM   Result Value Ref Range    Vitamin D Deficiency screening 15 (L) 20 - 75 ug/L          Psychiatric Examination:     BP (!) 140/96   Pulse 83   Temp 97.8  F (36.6  C) (Oral)   Resp 18   Ht 1.829 m (6')   Wt 104.5 kg (230 lb 6.4 oz)   SpO2 95%   BMI 31.25 kg/m    Weight is 230 lbs 6.4  "oz  Body mass index is 31.25 kg/m .    Orthostatic Vitals       Most Recent      Lying Orthostatic /101 02/07 0900    Lying Orthostatic Pulse (bpm) 86 02/07 0900        Appearance: awake, alert, dressed in hospital scrubs, appeared as age stated and unkempt  Attitude:  cooperative  Eye Contact:  good  Mood:  \"better\"  Affect:  mood congruent and intensity is blunted  Speech:  clear, coherent  Language: fluent and intact in English  Psychomotor, Gait, Musculoskeletal:  no evidence of tardive dyskinesia, dystonia, or tics  Throught Process:  linear and goal oriented  Associations:  no loose associations  Thought Content:  no evidence of suicidal ideation or homicidal ideation and no evidence of psychotic thought  Insight:  limited  Judgement:  limited  Oriented to:  time, person, and place  Attention Span and Concentration:  intact  Recent and Remote Memory:  intact  Fund of Knowledge:  low-normal    Clinical Global Impressions  First:  Considering your total clinical experience with this particular patient population, how severe are the patient's symptoms at this time?: 7 (02/06/20 0730)  Compared to the patient's condition at the START of treatment, this patient's condition is:: 4 (02/06/20 0730)  Most recent:  Considering your total clinical experience with this particular patient population, how severe are the patient's symptoms at this time?: 7 (02/06/20 0730)  Compared to the patient's condition at the START of treatment, this patient's condition is:: 4 (02/06/20 0730)           Precautions:     Behavioral Orders   Procedures     Assault precautions     Code 1 - Restrict to Unit     Routine Programming     As clinically indicated     Status 15     Every 15 minutes.     Suicide precautions     Patients on Suicide Precautions should have a Combination Diet ordered that includes a Diet selection(s) AND a Behavioral Tray selection for Safe Tray - with utensils, or Safe Tray - NO utensils            Diagnoses: "   Bipolar disorder type 1, current episode depressed  Alcohol use disorder, moderate  Methamphetamine use disorder, moderate         Assessment & Plan:   Assessment and hospital summary:  This patient is a 49 year old male with history of BPAD and polysubstance use who presented to ED with suicidal ideation in context of increased stressors and medication non-adherence. He is agreeable to restarting Depakote and mirtazapine to further target mood and continue PTA invega sustenna. Will get LFTs and CBC and VPA level before restarting Depakote. Patient would likely benefit from IRTS vs MICD treatment upon discharge.  Due to patient reporting significant depression symptoms he was restarted on Depakote and mirtazapine on admission to target mood.     Inpatient psychiatric hospitalization is warranted at this time for safety, stabilization, and possible adjustment in medications.    Psychiatric treatment/inteventions:  Medications:   -Continue PTA Depakote ER at 1000mg at bedtime, level ordered for Monday   -continue PTA mirtazapine 15 mg at bedtime  -continue PTA melatonin 3 mg at 7pm  -continue PTA Invega Sustenna, inject 156 mg IM q. month (last injection 1/24/20, next due 2/21/20)     Laboratory/Imaging: CMP notable for creatinine 0.60 (L) otherwise WNL, CBC WNL and VPA level 5 (L); lipid panel notable for HDL 27 (L),  (H), non- (H), triglycerides 162 (H), total cholesterol 168; TSH 1.69; vitamin D 15 (L);  Utox positive for methamphetamines and cannabinoids     Patient will be treated in therapeutic milieu with appropriate individual and group therapies as described.     Medical treatment/interventions:  Medical concerns:   1) Vit D def  -pt was prescribed 2000U daily PTA but not taking, will replace with 30704E weekly x7 weeks  2) Patient denies any current acute or chronic medical concerns aside from taking lisinopril for HTN which will be continued     Plan: as above, continue to monitor  Consults:  None     Legal Status: Voluntary     Safety Assessment:   Checks: Status 15  Pt has not required locked seclusion or restraints in the past 24 hours to maintain safety, please refer to RN documentation for further details.    The risks, benefits, alternatives and side effects have been discussed and are understood by the patient.     Disposition: Pending clinical stabilization. Will likely discharge to home Monday if stabilization continues.      This note was created by judy using a Dragon dictation system. All typing errors or contextual distortion are unintentional and software inherent.      Tasha Infante,   [facility] Psychiatry

## 2020-02-07 NOTE — PLAN OF CARE
Pt was isolative to his room coming out for meals only.  Pt was med compliant.  Pt has been sleeping/napping all shift, no groups attended.  Pt presents with a flat blunted affect.  Pt denies auditory and visual hallucination.  Pt denies SI.  Will continue to monitor.

## 2020-02-08 PROCEDURE — 25000132 ZZH RX MED GY IP 250 OP 250 PS 637: Performed by: PSYCHIATRY & NEUROLOGY

## 2020-02-08 PROCEDURE — 12400001 ZZH R&B MH UMMC

## 2020-02-08 RX ADMIN — Medication 3 MG: at 22:26

## 2020-02-08 RX ADMIN — MIRTAZAPINE 15 MG: 15 TABLET, FILM COATED ORAL at 22:27

## 2020-02-08 RX ADMIN — NICOTINE 1 PATCH: 14 PATCH, EXTENDED RELEASE TRANSDERMAL at 09:02

## 2020-02-08 RX ADMIN — LISINOPRIL 20 MG: 10 TABLET ORAL at 09:02

## 2020-02-08 RX ADMIN — DIVALPROEX SODIUM 1000 MG: 500 TABLET, EXTENDED RELEASE ORAL at 22:27

## 2020-02-08 NOTE — PROGRESS NOTES
Pt has been sleeping in hisroom for most of the shift. writer was not able to do a daily check-in with Pt because pt has been sleeping in his room.      02/08/20 1500   Behavioral Health   Hallucinations other (see comment)  (SUKUMAR )   Thinking other (see comment)  (SUKUMAR )   1. Wish to be Dead (Recent)   (SUKUMAR )   2. Non-Specific Active Suicidal Thoughts (Recent)   (SUKUMAR )

## 2020-02-08 NOTE — PROGRESS NOTES
Nabor slept for most of the evening only coming out for snack and dinner. He stated he thinks that his meds are making him more tired. Nabor stated he was feeling much better and denied having any SI/SIB/HI/A/VH. Nabor stated he was feeling great but just very tired. He ate snack and dinner and spent the rest of the evening in his room.     02/07/20 2039   Behavioral Health   Hallucinations denies / not responding to hallucinations   Thinking intact   Orientation person: oriented;place: oriented   Memory baseline memory   Insight insight appropriate to situation;insight appropriate to events   Judgement intact   Eye Contact at examiner   Affect blunted, flat   Mood mood is calm   Physical Appearance/Attire untidy   Hygiene body odor   1. Wish to be Dead (Recent) No   2. Non-Specific Active Suicidal Thoughts (Recent) No   Activity isolative;withdrawn   Medication Sensitivity no observed side effects;no stated side effects  (possibly more tired)   Psychomotor / Gait balanced;steady   Psycho Education   Type of Intervention 1:1 intervention   Response participates, initiates socially appropriate   Hours 0.5   Activities of Daily Living   Hygiene/Grooming independent   Oral Hygiene independent   Dress scrubs (behavioral health)   Laundry with supervision   Room Organization independent

## 2020-02-09 PROCEDURE — 25000132 ZZH RX MED GY IP 250 OP 250 PS 637: Performed by: PSYCHIATRY & NEUROLOGY

## 2020-02-09 PROCEDURE — 12400001 ZZH R&B MH UMMC

## 2020-02-09 RX ADMIN — NICOTINE 1 PATCH: 14 PATCH, EXTENDED RELEASE TRANSDERMAL at 09:05

## 2020-02-09 RX ADMIN — Medication 3 MG: at 20:37

## 2020-02-09 RX ADMIN — MIRTAZAPINE 15 MG: 15 TABLET, FILM COATED ORAL at 20:37

## 2020-02-09 RX ADMIN — LISINOPRIL 20 MG: 10 TABLET ORAL at 09:05

## 2020-02-09 RX ADMIN — DIVALPROEX SODIUM 1000 MG: 500 TABLET, EXTENDED RELEASE ORAL at 20:37

## 2020-02-09 ASSESSMENT — ACTIVITIES OF DAILY LIVING (ADL)
DRESS: SCRUBS (BEHAVIORAL HEALTH);INDEPENDENT
HYGIENE/GROOMING: HANDWASHING;INDEPENDENT
ORAL_HYGIENE: PROMPTS

## 2020-02-09 NOTE — PLAN OF CARE
"Pt has been isolative to his room, briefly came out for lunch.  Pt is minimally social with his peers.  During check-in pt reports improved mood, less depression.  Pt states \"my sister paid my rent so now I have a place to go to.  I hope to discharge on Tuesday.  I feel safe to discharge\".  Pt denies auditory and visual hallucinations.  Pt denies having thoughts to hurt himself.  Will continue to assess.    "

## 2020-02-09 NOTE — PROGRESS NOTES
Pt slept the entire evening shift, he came out for dinner, returned to room and slept for the duration of the shift.Pt was unable to assess.      02/08/20 2100   Behavioral Health   Hallucinations other (see comment)  (srinivasa)   Thinking other (see comment)  (srinivasa)   Orientation   (srinivasa)   Memory other (see comment)  (srinivasa)   Eye Contact   (srinivasa)   Affect other (see comments)  (srinivasa)   1. Wish to be Dead (Recent)   (srinivasa)   2. Non-Specific Active Suicidal Thoughts (Recent)   (srinivasa)

## 2020-02-10 LAB — VALPROATE SERPL-MCNC: 94 MG/L (ref 50–100)

## 2020-02-10 PROCEDURE — 80164 ASSAY DIPROPYLACETIC ACD TOT: CPT | Performed by: PSYCHIATRY & NEUROLOGY

## 2020-02-10 PROCEDURE — 12400001 ZZH R&B MH UMMC

## 2020-02-10 PROCEDURE — 99232 SBSQ HOSP IP/OBS MODERATE 35: CPT | Performed by: PSYCHIATRY & NEUROLOGY

## 2020-02-10 PROCEDURE — 25000132 ZZH RX MED GY IP 250 OP 250 PS 637: Performed by: PSYCHIATRY & NEUROLOGY

## 2020-02-10 PROCEDURE — 36415 COLL VENOUS BLD VENIPUNCTURE: CPT | Performed by: PSYCHIATRY & NEUROLOGY

## 2020-02-10 RX ORDER — CHOLECALCIFEROL (VITAMIN D3) 1250 MCG
50000 CAPSULE ORAL
Qty: 6 CAPSULE | Refills: 0 | Status: SHIPPED | OUTPATIENT
Start: 2020-02-14

## 2020-02-10 RX ORDER — LANOLIN ALCOHOL/MO/W.PET/CERES
3 CREAM (GRAM) TOPICAL EVERY EVENING
Qty: 30 TABLET | Refills: 0 | Status: SHIPPED | OUTPATIENT
Start: 2020-02-10

## 2020-02-10 RX ORDER — DIVALPROEX SODIUM 500 MG/1
1000 TABLET, EXTENDED RELEASE ORAL AT BEDTIME
Qty: 60 TABLET | Refills: 0 | Status: SHIPPED | OUTPATIENT
Start: 2020-02-10

## 2020-02-10 RX ORDER — LISINOPRIL 20 MG/1
20 TABLET ORAL DAILY
Qty: 30 TABLET | Refills: 0 | Status: SHIPPED | OUTPATIENT
Start: 2020-02-10

## 2020-02-10 RX ORDER — MIRTAZAPINE 15 MG/1
15 TABLET, FILM COATED ORAL AT BEDTIME
Qty: 30 TABLET | Refills: 0 | Status: SHIPPED | OUTPATIENT
Start: 2020-02-10

## 2020-02-10 RX ADMIN — DIVALPROEX SODIUM 1000 MG: 500 TABLET, EXTENDED RELEASE ORAL at 21:25

## 2020-02-10 RX ADMIN — LISINOPRIL 20 MG: 10 TABLET ORAL at 08:46

## 2020-02-10 RX ADMIN — Medication 3 MG: at 21:25

## 2020-02-10 RX ADMIN — NICOTINE 1 PATCH: 14 PATCH, EXTENDED RELEASE TRANSDERMAL at 08:46

## 2020-02-10 RX ADMIN — MIRTAZAPINE 15 MG: 15 TABLET, FILM COATED ORAL at 21:25

## 2020-02-10 ASSESSMENT — ACTIVITIES OF DAILY LIVING (ADL)
LAUNDRY: WITH SUPERVISION
DRESS: SCRUBS (BEHAVIORAL HEALTH);STREET CLOTHES
ORAL_HYGIENE: INDEPENDENT
HYGIENE/GROOMING: INDEPENDENT

## 2020-02-10 NOTE — PROGRESS NOTES
[facility], [place]   Psychiatric Progress Note  Hospital Day: 5        Interim History:   The patient's care was discussed with the treatment team during the daily team meeting and/or staff's chart notes were reviewed.  Staff report patient isolated in room over weekend, taking medications as prescribed, reporting improvement in symptoms, no acute events over weekend.     Upon interview, the patient reports that he is continuing to feel better and is making plans to discharge tomorrow.  He continues to tolerate his medications.  He denies having any suicidal ideation.  He denies any symptoms of psychosis.  He reports that his sister shut down her business and so he plans to start looking for another job but at least he knows his rent is paid for the next month.  He believes he has follow-up appointments with his psychiatric providers will plan to follow-up with them regarding his medications.  No additional concerns at this time.         Medications:       cholecalciferol  50,000 Units Oral Q7 Days     divalproex sodium extended-release  1,000 mg Oral At Bedtime     lisinopril  20 mg Oral Daily     melatonin  3 mg Oral QPM     mirtazapine  15 mg Oral At Bedtime     nicotine  1 patch Transdermal Daily     nicotine   Transdermal Q8H     [START ON 2/28/2020] paliperidone  156 mg Intramuscular Once          Allergies:   No Known Allergies       Labs:     Recent Results (from the past 48 hour(s))   Valproic acid    Collection Time: 02/10/20  7:46 AM   Result Value Ref Range    Valproic Acid Level 94 50 - 100 mg/L          Psychiatric Examination:     /85   Pulse 87   Temp 97.9  F (36.6  C) (Oral)   Resp 16   Ht 1.829 m (6')   Wt 104.5 kg (230 lb 6.4 oz)   SpO2 96%   BMI 31.25 kg/m    Weight is 230 lbs 6.4 oz  Body mass index is 31.25 kg/m .    Orthostatic Vitals       Most Recent      Standing Orthostatic /84 02/10 0844    Standing Orthostatic Pulse (bpm) 89 02/10 0844     "    Appearance: awake, alert, dressed in hospital scrubs, appeared as age stated and unkempt  Attitude:  cooperative  Eye Contact:  good  Mood:  \"good\"  Affect:  mood congruent and intensity is blunted  Speech:  clear, coherent  Language: fluent and intact in English  Psychomotor, Gait, Musculoskeletal:  no evidence of tardive dyskinesia, dystonia, or tics  Throught Process:  linear and goal oriented  Associations:  no loose associations  Thought Content:  no evidence of suicidal ideation or homicidal ideation and no evidence of psychotic thought  Insight:  limited  Judgement:  fair  Oriented to:  time, person, and place  Attention Span and Concentration:  intact  Recent and Remote Memory:  intact  Fund of Knowledge:  low-normal    Clinical Global Impressions  First:  Considering your total clinical experience with this particular patient population, how severe are the patient's symptoms at this time?: 7 (02/06/20 0730)  Compared to the patient's condition at the START of treatment, this patient's condition is:: 4 (02/06/20 0730)  Most recent:  Considering your total clinical experience with this particular patient population, how severe are the patient's symptoms at this time?: 7 (02/06/20 0730)  Compared to the patient's condition at the START of treatment, this patient's condition is:: 4 (02/06/20 0730)           Precautions:     Behavioral Orders   Procedures     Assault precautions     Code 1 - Restrict to Unit     Routine Programming     As clinically indicated     Status 15     Every 15 minutes.     Suicide precautions     Patients on Suicide Precautions should have a Combination Diet ordered that includes a Diet selection(s) AND a Behavioral Tray selection for Safe Tray - with utensils, or Safe Tray - NO utensils            Diagnoses:   Bipolar disorder type 1, current episode depressed  Alcohol use disorder, moderate  Methamphetamine use disorder, moderate         Assessment & Plan:   Assessment and " hospital summary:  This patient is a 49 year old male with history of BPAD and polysubstance use who presented to ED with suicidal ideation in context of increased stressors and medication non-adherence. He is agreeable to restarting Depakote and mirtazapine to further target mood and continue PTA invega sustenna. Obtained LFTs and CBC and VPA level before restarting Depakote. Discussed with patient he would likely benefit from IRTS vs MICD treatment upon discharge however he was able to get his rent paid and planned to discharge back home with outpatient follow up. His VPA level of therapeutic at 94.      Inpatient psychiatric hospitalization is warranted at this time for safety, stabilization, and possible adjustment in medications.    Psychiatric treatment/inteventions:  Medications:   -Continue PTA Depakote ER at 1000mg at bedtime, level was 94  -continue PTA mirtazapine 15 mg at bedtime  -continue PTA melatonin 3 mg at 7pm  -continue PTA Invega Sustenna, inject 156 mg IM q. month (last injection 20, next due 20)     Laboratory/Imagin/10/20: VPA 94; On admission CMP notable for creatinine 0.60 (L) otherwise WNL, CBC WNL and VPA level 5 (L); lipid panel notable for HDL 27 (L),  (H), non- (H), triglycerides 162 (H), total cholesterol 168; TSH 1.69; vitamin D 15 (L);  Utox positive for methamphetamines and cannabinoids     Patient will be treated in therapeutic milieu with appropriate individual and group therapies as described.     Medical treatment/interventions:  Medical concerns:   1) Vit D def  -pt was prescribed 2000U daily PTA but not taking, started to replace with 93779H weekly x7 weeks on 2020  2) Patient denies any current acute or chronic medical concerns aside from taking lisinopril for HTN which will be continued     Plan: as above, continue to monitor  Consults: None     Legal Status: Voluntary     Safety Assessment:   Checks: Status 15  Pt has not required locked  seclusion or restraints in the past 24 hours to maintain safety, please refer to RN documentation for further details.    The risks, benefits, alternatives and side effects have been discussed and are understood by the patient.     Disposition: Will plan to discharge home tomorrow if stabilization continues.      This note was created by judy using a Dragon dictation system. All typing errors or contextual distortion are unintentional and software inherent.      Tasha Infante,   [facility] Psychiatry

## 2020-02-10 NOTE — PROGRESS NOTES
02/10/20 1400   Behavioral Health   Hallucinations denies / not responding to hallucinations   Thinking poor concentration   Orientation place: oriented;date: oriented;time: oriented   Memory baseline memory   Insight insight appropriate to situation   Eye Contact at examiner   Affect full range affect   Mood other (see comments)  (Appears excitable. Reports plan for D/C tomm.)   Physical Appearance/Attire untidy;disheveled   Hygiene neglected grooming - unclean body, hair, teeth   Suicidality other (see comments)  (Denies.)   1. Wish to be Dead (Recent) No   2. Non-Specific Active Suicidal Thoughts (Recent) No   Self Injury other (see comment)  (Denies.)   Activity isolative;withdrawn   Speech coherent;clear   Medication Sensitivity no stated side effects;no observed side effects   Psychomotor / Gait balanced;steady

## 2020-02-10 NOTE — PROGRESS NOTES
Nabor spent most of the evening resting/napping in his room.  He came out for dinner and snacks, otherwise rested.  On approach he appears unkempt and malodorous, he didn't shower this evening. He reports feeling more optimistic, hopeful and mood is elevated.  Nabor's RN during the day shift reported that he anticipates having a safe place to go once he's discharged.  He currently denies hallucinations, paranoid thinking, SI and SIB urges/thoughts.       02/09/20 2200   Sleep/Rest/Relaxation   Day/Evening Time Hours napping;resting in bed   Number of hours napping 3 hours   Number of hours resting in bed 1 hours   Behavioral Health   Hallucinations denies / not responding to hallucinations   Thinking poor concentration   Orientation person: oriented;place: oriented   Memory baseline memory   Insight poor   Judgement impaired   Eye Contact at examiner   Affect blunted, flat   Mood labile   Physical Appearance/Attire untidy;disheveled   Hygiene neglected grooming - unclean body, hair, teeth   Suicidality other (see comments)  (Currently denies SI.)   1. Wish to be Dead (Recent) No   2. Non-Specific Active Suicidal Thoughts (Recent) No   Self Injury other (see comment)  (Currently denies SIB urges/thoughts.)   Elopement   (No concernable statements or behaviors observed.)   Activity isolative;withdrawn;other (see comment)  (Sleeping/napping in bed for most of the evening. )   Speech coherent   Medication Sensitivity no stated side effects;no observed side effects   Psychomotor / Gait balanced;steady   Coping/Psychosocial   Verbalized Emotional State acceptance;hopefulness   Trust Relationship/Rapport care explained;choices provided;empathic listening provided;questions answered;questions encouraged;reassurance provided;thoughts/feelings acknowledged   Coping/Psychosocial Interventions   Supportive Measures active listening utilized;counseling provided;problem solving facilitated;relaxation techniques  promoted;self-reflection promoted;self-responsibility promoted;verbalization of feelings encouraged   Activities of Daily Living   Hygiene/Grooming handwashing;independent   Oral Hygiene prompts   Dress scrubs (behavioral health);independent   Room Organization independent   Activity   Activity Assistance Provided independent   Hygiene Care Assistance   Hygiene Assistance independent   Groups   Details Art Therapy Group  (Did not attend group.)

## 2020-02-11 VITALS
OXYGEN SATURATION: 96 % | TEMPERATURE: 97.2 F | DIASTOLIC BLOOD PRESSURE: 83 MMHG | HEIGHT: 72 IN | SYSTOLIC BLOOD PRESSURE: 135 MMHG | RESPIRATION RATE: 16 BRPM | BODY MASS INDEX: 30.83 KG/M2 | WEIGHT: 227.6 LBS | HEART RATE: 94 BPM

## 2020-02-11 PROCEDURE — 25000132 ZZH RX MED GY IP 250 OP 250 PS 637: Performed by: PSYCHIATRY & NEUROLOGY

## 2020-02-11 PROCEDURE — 99239 HOSP IP/OBS DSCHRG MGMT >30: CPT | Performed by: PSYCHIATRY & NEUROLOGY

## 2020-02-11 RX ADMIN — NICOTINE 1 PATCH: 14 PATCH, EXTENDED RELEASE TRANSDERMAL at 08:20

## 2020-02-11 RX ADMIN — LISINOPRIL 20 MG: 10 TABLET ORAL at 08:20

## 2020-02-11 ASSESSMENT — MIFFLIN-ST. JEOR: SCORE: 1940.39

## 2020-02-11 NOTE — DISCHARGE SUMMARY
"Psychiatric Discharge Summary    Nabor Mendoza MRN# [id number]   Age: 49 year old YOB: 1970     Date of Admission:  2/5/2020  Date of Discharge:  2/11/2020  Admitting Physician:  Tasha Infante MD  Discharge Physician:  Tasha Infante MD         Event Leading to Hospitalization:   Nabor is a 49 year old male with history of BPAD and polysubstance use who presented to ED with suicidal ideation in context of increased stressors and medication non-adherence.     Per ED: Nabor Mendoza is a 49 year old male with a history of bipolar 1 disorder, schizoaffective disorder, depression, anxiety, and suicidal ideation who presents to the emergency department for evaluation of suicidal ideation. 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 Today, he passively thought about suicide using either a gun or knife, but states he does not have access to a gun. When he thought about going to buy a gun, he decided to come here instead. He states that he would like to be admitted until he can \"get a job again.\" His psychiatrist is Dr. Torrez, he is on Invega, and he states he is taking his medications. He denies alcohol or drug use today; he last smoked marijuana 7 days ago and usually has 3 drinks once a week.      Upon interview patient reports that he has been feeling \"horrible\" due to losing his job and getting evicted from his home.  He states he has been \"really depressed lately\" and this has been going on \"for years\".  He does report that it is been worse in the last few months due to increasing stressors including difficulties at work causing financial strain and ultimately the eviction from the home he has been renting in a house in [place].  He reports he has had suicidal ideation chronically but lately he has been more \"tired " "of life\".  He started to think of plans a few months ago and this increased in intensity over the last few weeks.  He is followed at [facility] for psychiatry for many years.  He has been getting his Invega Sustenna regularly and denies having any current symptoms of psychosis.  He does endorse feeling depressed, having suicidal ideation, guilt, hopelessness, helplessness, poor concentration, low energy, difficulty with sleep and irritability.  He denies any symptoms of maria elena.  He reports he has excessive worries that are difficult to control but has not had any panic attacks.  He is supposed to be taking mirtazapine and Depakote to help with his mood but has not taken them for many months.  He is agreement with restarting his medications to further target his mood.  He reports that he uses alcohol occasionally along with marijuana once a week.  He states he has maybe 3 drinks per week of hard liquor and they are small mixed drinks.  He denies a history of complicated withdrawal.  When asked about his U tox being positive for amphetamines he states that he took an Adderall on Friday night that a friend gave to him and he does this occasionally.  He denies any other substance use.  He would be open to possibly considering treatment versus and IRTS to help with stabilization upon discharge.  He has been under commitment in the past but states that this has not been in place for a few months.  He also is on a 30-year probation for possession of LSD and his probation is up this March.  He denies having any physical health concerns at this time and states he does take lisinopril for his blood pressure but no other chronic medical concerns.  His goals for hospitalization are to be restarted on medications to help with depression and sleep, get supports in place for discharge and to have improvement in his suicidal ideation.       See Admission note by Tasha Infante MD on 2/5/20 for additional details.          " Diagnoses:     Bipolar disorder type 1, current episode depressed  Alcohol use disorder, moderate  Methamphetamine use disorder, moderate         Labs:   2/10/20: VPA 94; On admission CMP notable for creatinine 0.60 (L) otherwise WNL, CBC WNL and VPA level 5 (L); lipid panel notable for HDL 27 (L),  (H), non- (H), triglycerides 162 (H), total cholesterol 168; TSH 1.69; vitamin D 15 (L);  Utox positive for methamphetamines and cannabinoids         Consults:   No consultations were requested during this admission         Hospital Course:   Nabor Mendoza was admitted to [facility] with attending Tasha Infante MD as a voluntary patient. The patient was placed under status 15 (15 minute checks) to ensure patient safety. Labs obtained as above. On admission he reported depression symptoms but denied any symptoms of psychosis and reported he was only adherent to Invega PTA and had not taken mirtazapine or Depakote for many weeks to months. He was agreeable to restarting Depakote and mirtazapine to further target mood and continue PTA invega sustenna. Obtained LFTs and CBC and VPA level before restarting Depakote. Discussed with patient he would likely benefit from IRTS vs MICD treatment upon discharge however he was able to get his rent paid and planned to discharge back home with outpatient follow up. His VPA level of therapeutic at 94. He did not participate in groups and isolated to his room. He reported improvement in his depression symptoms including suicidal ideation and felt ready to discharge.     Today Nabor Mendoza reports he does not have any thoughts to harm himself or others and denies any symptoms of psychosis. In addition, he has notable risk factors for self-harm, including single status, psychosis and substance abuse. However, risk is mitigated by commitment to family, absence of past attempts, ability to volunteer a safety plan and history of seeking help when needed. Therefore, based on  "all available evidence including the factors cited above, he does not appear to be at imminent risk for self-harm, does not meet criteria for a 72-hr hold, and therefore remains appropriate for ongoing outpatient level of care.     Nabor Mendoza was discharged to home. At the time of discharge Nabor Mendoza was determined to not be a danger to himself or others.          Discharge Medications:     Current Discharge Medication List      START taking these medications    Details   divalproex sodium extended-release (DEPAKOTE ER) 500 MG 24 hr tablet Take 2 tablets (1,000 mg) by mouth At Bedtime  Qty: 60 tablet, Refills: 0    Associated Diagnoses: Schizoaffective disorder, bipolar type (H)      melatonin 3 MG tablet Take 1 tablet (3 mg) by mouth every evening  Qty: 30 tablet, Refills: 0    Associated Diagnoses: Schizoaffective disorder, bipolar type (H)      mirtazapine (REMERON) 15 MG tablet Take 1 tablet (15 mg) by mouth At Bedtime  Qty: 30 tablet, Refills: 0    Associated Diagnoses: Schizoaffective disorder, bipolar type (H)         CONTINUE these medications which have CHANGED    Details   cholecalciferol (VITAMIN D3) 20159 units (1250 mcg) capsule Take 1 capsule (50,000 Units) by mouth every 7 days  Qty: 6 capsule, Refills: 0    Associated Diagnoses: Vitamin D deficiency      lisinopril (PRINIVIL/ZESTRIL) 20 MG tablet Take 1 tablet (20 mg) by mouth daily  Qty: 30 tablet, Refills: 0    Associated Diagnoses: Other secondary hypertension         CONTINUE these medications which have NOT CHANGED    Details   paliperidone (INVEGA SUSTENNA) 156 MG/ML SUSP injection Inject 1 mL (156 mg) into the muscle every 28 days Next dose 6/15/2018  Qty: 0.9 mL, Refills: 1    Associated Diagnoses: Schizoaffective disorder, bipolar type (H)                  Psychiatric Examination:   Appearance: awake, alert, dressed in hospital scrubs, appeared as age stated and unkempt  Attitude:  cooperative  Eye Contact:  good  Mood:  \"better\"  Affect: "  mood congruent and intensity is blunted  Speech:  clear, coherent  Language: fluent and intact in English  Psychomotor, Gait, Musculoskeletal:  no evidence of tardive dyskinesia, dystonia, or tics  Throught Process:  linear and goal oriented  Associations:  no loose associations  Thought Content:  no evidence of suicidal ideation or homicidal ideation and no evidence of psychotic thought  Insight:  limited  Judgement:  fair, adequate for safety  Oriented to:  time, person, and place  Attention Span and Concentration:  intact  Recent and Remote Memory:  intact  Fund of Knowledge:  low-normal         Discharge Plan:   Health Care Follow-up Appointments:   Psychiatry  Date/Time: 2/18/20    Provider: Herman Torrez MD   Address: [address] [place] [address]   Phone: [phone]  Fax: [phone]      Medication Management Phone Appointment   Date/Time:2/13/20 @2:00PM  Provider: Seb   Phone:[phone]- call this number if you need to reschedule    Attestation:  Patient has been seen and evaluated by me, Tasha Infante DO on day of discharge. 35 minutes spent in coordination of discharge planning.

## 2020-02-11 NOTE — PROGRESS NOTES
Nabor spent almost the entire shift in his room sleeping, but visibile in milieu during dinner and snack. He was calm and cooperative with blunted, flat mood affect. He was polite and pleasant upon approach. He stated that he is discharging tomorrow and excited about that. He denied all mental health symptoms including Si/SIB. His appetite was good and sleeping well. No major concerns or questions. No behavioral issues.         02/10/20 2139   Behavioral Health   Hallucinations denies / not responding to hallucinations   Thinking poor concentration   Orientation time: oriented;date: oriented;place: oriented   Memory baseline memory   Insight insight appropriate to events   Judgement impaired   Eye Contact at examiner   Affect blunted, flat   Mood mood is calm   Physical Appearance/Attire attire appropriate to age and situation   Hygiene neglected grooming - unclean body, hair, teeth   Suicidality   (denied )   1. Wish to be Dead (Recent) No   2. Non-Specific Active Suicidal Thoughts (Recent) No   Self Injury   (denied )   Elopement   (denied )   Speech   (denied )   Psychomotor / Gait steady;balanced   Psycho Education   Type of Intervention 1:1 intervention   Response participates, initiates socially appropriate   Hours 0.5   Activities of Daily Living   Hygiene/Grooming independent   Oral Hygiene independent   Dress scrubs (behavioral health);street clothes   Laundry with supervision   Room Organization independent

## 2020-02-11 NOTE — DISCHARGE INSTRUCTIONS
Behavioral Discharge Planning and Instructions      Summary:  You were admitted on 2/5/2020  due to Depression, Anxiety and Suicidal Ideations. You were treated by Dr. Tasha Infante DO and discharged on 2/11/20 from [facility] to Home.       Principal Diagnosis:   Bipolar disorder type 1, current episode depressed  Alcohol use disorder, moderate  Methamphetamine use disorder, moderate    Health Care Follow-up Appointments:   Psychiatry  Date/Time: 2/18/20    Provider: Herman Torrez MD   Address: [address] [place] [id number]   Phone: [phone]  Fax: [phone]     Medication Management Phone Appointment   Date/Time:2/13/20 @2:00PM  Provider: Seb   Phone:[phone]- call this number if you need to reschedule  Attend all scheduled appointments with your outpatient providers. Call at least 24 hours in advance if you need to reschedule an appointment to ensure continued access to your outpatient providers.   Major Treatments, Procedures and Findings:  You were provided with: a psychiatric assessment, medication evaluation and/or management, group therapy and milieu management    Symptoms to Report: feeling more aggressive, increased confusion, losing more sleep, mood getting worse or thoughts of suicide    Early warning signs can include: increased depression or anxiety sleep disturbances increased thoughts or behaviors of suicide or self-harm  increased unusual thinking, such as paranoia or hearing voices    Safety and Wellness:  Take all medicines as directed. Make no changes unless your doctor suggests them. Follow treatment recommendations. Refrain from alcohol and non-prescribed drugs.  If there is a concern for safety, call 911.    Resources:   [facility]   Crisis services are available 24/7 if you are having a mental health crisis.    COPE (Community Outreach for Psychiatric Emergencies): [phone]    In the [facility], call **CRISIS (887520) from a cell phone  to talk to a team of professionals who can help you.    Crisis Text Line: Text [place] to 634224  These are crisis residences where you can stay for a short time if you feel like you need to stabilize but do not need to go to the hospital.  [facility]  - Crisis Beds  1.[facility] Crisis Stabilization Program  [address] [place] [address]  Phone:[phone]    2.[facility] Crisis [facility]idence  [address] [place] [address]  Phone: [phone]    3.[facility]  [address] [place] [address]  Phone: [phone]    If you ever need immediate access to services, [facility] has a walk in triage services to help you with what you need. This Triage Center is located in Room [address]141 at [address]. Go through the front door of [address], and follow signs to [address].   Triage hours:10:00 am - 5:00 pm  Triage Phone Number: [phone]    The treatment team has appreciated the opportunity to work with you.     If you have any questions or concerns our unit number is [phone].   If you have the need for records regarding this hospitalization please contact the medical records department at [phone].

## 2020-02-11 NOTE — PLAN OF CARE
Pt discharged from [facility] to home, pt was given copy of their discharge instructions and medication administration instructions. All discharge plans were discussed with patient. Pt reports no questions at this time regarding discharge plans, or medications. Pt denies any suicidal ideation, plans or intent at this time. Patient has received all his belongings.  Pt took a bus, was given bus tokens.

## 2020-02-13 ENCOUNTER — TELEPHONE (OUTPATIENT)
Dept: PHARMACY | Facility: CLINIC | Age: 50
End: 2020-02-13

## 2020-02-13 NOTE — TELEPHONE ENCOUNTER
Called patient for 2 PM scheduled DOMINGUEZ x2. No answer. LVM x2. 2nd voicemail included MTM Scheduling line for patient to call and reschedule.    Routing to MTM coordinators as SHAE.    Seb Cuevas, JoseD, [facility] Hospital[facility]  Medication Therapy Management Pharmacist  Pager: [phone]

## 2023-11-21 VITALS
WEIGHT: 227 LBS | BODY MASS INDEX: 30.75 KG/M2 | RESPIRATION RATE: 18 BRPM | HEIGHT: 72 IN | DIASTOLIC BLOOD PRESSURE: 67 MMHG | SYSTOLIC BLOOD PRESSURE: 107 MMHG | TEMPERATURE: 97.9 F | OXYGEN SATURATION: 97 % | HEART RATE: 79 BPM

## 2023-11-21 PROCEDURE — 99283 EMERGENCY DEPT VISIT LOW MDM: CPT | Performed by: EMERGENCY MEDICINE

## 2023-11-22 ENCOUNTER — HOSPITAL ENCOUNTER (EMERGENCY)
Facility: CLINIC | Age: 53
Discharge: HOME | End: 2023-11-22
Attending: EMERGENCY MEDICINE | Admitting: EMERGENCY MEDICINE
Payer: COMMERCIAL

## 2023-11-22 DIAGNOSIS — F19.10 DRUG ABUSE (H): ICD-10-CM

## 2023-11-22 DIAGNOSIS — F41.9 ANXIETY: ICD-10-CM

## 2023-11-22 LAB — GROUP A STREP BY PCR: NOT DETECTED

## 2023-11-22 PROCEDURE — 87651 STREP A DNA AMP PROBE: CPT | Performed by: EMERGENCY MEDICINE

## 2023-11-22 ASSESSMENT — ENCOUNTER SYMPTOMS
SLEEP DISTURBANCE: 1
WEAKNESS: 0
HEADACHES: 0
BACK PAIN: 0
NERVOUS/ANXIOUS: 1
SORE THROAT: 1
SHORTNESS OF BREATH: 0
DIARRHEA: 0
NUMBNESS: 0
NAUSEA: 0
ABDOMINAL PAIN: 0
VOMITING: 0
LIGHT-HEADEDNESS: 0
COUGH: 0
FEVER: 0
FATIGUE: 1
APPETITE CHANGE: 0

## 2023-11-22 ASSESSMENT — ACTIVITIES OF DAILY LIVING (ADL)
ADLS_ACUITY_SCORE: 33

## 2023-11-22 NOTE — ED TRIAGE NOTES
EMS Arrival Note  Comes from "[employer] Was there drinking and smoking weed. Had anxiety after smoking weed form a reputable source. Called 911 himself. Breathalyzer was reported from law enforcement was 0.02.      Nabor's Story  States hi was at [employer] today drinking. He was smoking weed. He has concerns for wanting getting into treat,ment for his meth, weed and alcohol usage. He also complains of months of on/off foot pain. Denies foot pain now.

## 2023-11-22 NOTE — ED PROVIDER NOTES
History     Chief Complaint   Patient presents with    Panic Attack     HPI  Nabor Mendoza is a 53 year old male with history of bipolar disorder as well as drug abuse presenting for evaluation of a panic attack.  [place].  He admits to using meth as well as some marijuana and alcohol.  He was supposed to go to treatment yesterday but was scared so did not go.  He would like to get to his treatment center in the morning if possible.  His only complaint at this time is a little sore throat.  Denies fevers or difficulty breathing.  Anxiety has resolved.  Denies chest pain or difficulty breathing.  Denies lightheadedness or dizziness.  Otherwise is feeling well.  He states he is tired and would like to sleep and then go to his drug treatment center in the morning.  Patient expresses sadness that he has been having conflict with his sister who manages his money for him.    Allergies:  No Known Allergies    Problem List:    Patient Active Problem List    Diagnosis Date Noted    Schizoaffective disorder, bipolar type (H) 05/21/2018     Priority: Medium    Moderate pain 05/21/2018     Priority: Medium    Other secondary hypertension 05/21/2018     Priority: Medium    Tobacco use 05/21/2018     Priority: Medium    Suicidal ideation 05/02/2018     Priority: Medium        Past Medical History:    Past Medical History:   Diagnosis Date    Anxiety     Bipolar 1 disorder (H)     Depressive disorder     Hypertension     Schizoaffective disorder (H)        Past Surgical History:    No past surgical history on file.    Family History:    No family history on file.    Social History:  Marital Status:  Single [1]  Social History     Tobacco Use    Smoking status: Every Day     Packs/day: 1     Types: Cigarettes    Smokeless tobacco: Never   Substance Use Topics    Alcohol use: Yes    Drug use: Yes     Types: Marijuana        Medications:    cholecalciferol (VITAMIN D3) 24543  units (1250 mcg) capsule  divalproex sodium extended-release (DEPAKOTE ER) 500 MG 24 hr tablet  lisinopril (PRINIVIL/ZESTRIL) 20 MG tablet  melatonin 3 MG tablet  mirtazapine (REMERON) 15 MG tablet  paliperidone (INVEGA SUSTENNA) 156 MG/ML SUSP injection          Review of Systems   Constitutional:  Positive for fatigue. Negative for appetite change and fever.   HENT:  Positive for sore throat. Negative for congestion.    Respiratory:  Negative for cough and shortness of breath.    Cardiovascular:  Negative for chest pain.   Gastrointestinal:  Negative for abdominal pain, diarrhea, nausea and vomiting.   Musculoskeletal:  Negative for back pain.   Skin:  Negative for rash.   Neurological:  Negative for weakness, light-headedness, numbness and headaches.   Psychiatric/Behavioral:  Positive for sleep disturbance. Negative for suicidal ideas. The patient is nervous/anxious.    All other systems reviewed and are negative.      Physical Exam   BP: 107/67  Pulse: 79  Temp: 97.9  F (36.6  C)  Resp: 18  Height: 182.9 cm (6')  Weight: 103 kg (227 lb)  SpO2: 97 %      Physical Exam  Vitals and nursing note reviewed.   Constitutional:       Appearance: Normal appearance. He is not ill-appearing or diaphoretic.   HENT:      Head: Atraumatic.      Nose: Nose normal.      Mouth/Throat:      Mouth: Mucous membranes are dry.      Pharynx: No oropharyngeal exudate or posterior oropharyngeal erythema.   Eyes:      Conjunctiva/sclera: Conjunctivae normal.   Cardiovascular:      Rate and Rhythm: Normal rate.      Pulses: Normal pulses.   Pulmonary:      Effort: Pulmonary effort is normal.   Abdominal:      Palpations: Abdomen is soft.      Tenderness: There is no abdominal tenderness.   Skin:     General: Skin is warm and dry.      Capillary Refill: Capillary refill takes less than 2 seconds.   Neurological:      Mental Status: He is alert and oriented to person, place, and time.   Psychiatric:         Mood and Affect: Mood normal.       Comments: Currently calm and cooperative without significant anxiety         ED Course                 Procedures                Results for orders placed or performed during the hospital encounter of 11/22/23 (from the past 24 hour(s))   Group A Streptococcus PCR Throat Swab    Specimen: Throat; Swab   Result Value Ref Range    Group A strep by PCR Not Detected Not Detected    Narrative    The Xpert Xpress Strep A test, performed on the [employer], is a rapid, qualitative in vitro diagnostic test for the detection of Streptococcus pyogenes (Group A ß-hemolytic Streptococcus, Strep A) in throat swab specimens from patients with signs and symptoms of pharyngitis. The Xpert Xpress Strep A test can be used as an aid in the diagnosis of Group A Streptococcal pharyngitis. The assay is not intended to monitor treatment for Group A Streptococcus infections. The Xpert Xpress Strep A test utilizes an automated real-time polymerase chain reaction (PCR) to detect Streptococcus pyogenes DNA.       Medications - No data to display    Assessments & Plan (with Medical Decision Making)  53-year-old with history of bipolar and drug abuse pending for evaluation of anxiety.  Developed a panic attack and came here.  Symptoms have subsided here however, he did complain of some sore throat: PCR strep test negative..  Patient states he was planning to go treatment yesterday and would like to go today.  Currently states he would just like to rest and then would take a cab to his treatment center tomorrow morning.  He was observed overnight without symptoms.  Discharged with plan to go to treatment in stable condition.     I have reviewed the nursing notes.    I have reviewed the findings, diagnosis, plan and need for follow up with the patient.          New Prescriptions    No medications on file       Final diagnoses:   Anxiety   Drug abuse (H)       11/21/2023   [facility] EMERGENCY DEPT       Qureshi,  Damien Pemberton MD  11/22/23 0614